# Patient Record
Sex: FEMALE | Race: WHITE | NOT HISPANIC OR LATINO | Employment: OTHER | URBAN - METROPOLITAN AREA
[De-identification: names, ages, dates, MRNs, and addresses within clinical notes are randomized per-mention and may not be internally consistent; named-entity substitution may affect disease eponyms.]

---

## 2020-05-20 ENCOUNTER — LAB VISIT (OUTPATIENT)
Dept: PRIMARY CARE CLINIC | Facility: CLINIC | Age: 65
End: 2020-05-20
Payer: MEDICARE

## 2020-05-20 DIAGNOSIS — R05.9 COUGH: Primary | ICD-10-CM

## 2020-05-20 PROCEDURE — U0003 INFECTIOUS AGENT DETECTION BY NUCLEIC ACID (DNA OR RNA); SEVERE ACUTE RESPIRATORY SYNDROME CORONAVIRUS 2 (SARS-COV-2) (CORONAVIRUS DISEASE [COVID-19]), AMPLIFIED PROBE TECHNIQUE, MAKING USE OF HIGH THROUGHPUT TECHNOLOGIES AS DESCRIBED BY CMS-2020-01-R: HCPCS

## 2020-05-21 LAB — SARS-COV-2 RNA RESP QL NAA+PROBE: NOT DETECTED

## 2022-01-14 ENCOUNTER — LAB VISIT (OUTPATIENT)
Dept: PRIMARY CARE CLINIC | Facility: CLINIC | Age: 67
End: 2022-01-14
Payer: MEDICARE

## 2022-01-14 DIAGNOSIS — Z20.822 CONTACT WITH AND (SUSPECTED) EXPOSURE TO COVID-19: ICD-10-CM

## 2022-01-14 LAB
CTP QC/QA: YES
SARS-COV-2 AG RESP QL IA.RAPID: NEGATIVE

## 2022-01-14 PROCEDURE — 87811 SARS-COV-2 COVID19 W/OPTIC: CPT

## 2023-07-03 ENCOUNTER — TELEPHONE (OUTPATIENT)
Dept: PAIN MEDICINE | Facility: CLINIC | Age: 68
End: 2023-07-03
Payer: MEDICARE

## 2023-07-05 ENCOUNTER — OFFICE VISIT (OUTPATIENT)
Dept: PAIN MEDICINE | Facility: CLINIC | Age: 68
End: 2023-07-05
Attending: ANESTHESIOLOGY
Payer: MEDICARE

## 2023-07-05 ENCOUNTER — TELEPHONE (OUTPATIENT)
Dept: PAIN MEDICINE | Facility: CLINIC | Age: 68
End: 2023-07-05

## 2023-07-05 ENCOUNTER — HOSPITAL ENCOUNTER (OUTPATIENT)
Dept: RADIOLOGY | Facility: OTHER | Age: 68
Discharge: HOME | End: 2023-07-05
Attending: ANESTHESIOLOGY
Payer: MEDICARE

## 2023-07-05 VITALS
WEIGHT: 160.94 LBS | DIASTOLIC BLOOD PRESSURE: 71 MMHG | RESPIRATION RATE: 18 BRPM | HEART RATE: 69 BPM | TEMPERATURE: 99 F | SYSTOLIC BLOOD PRESSURE: 104 MMHG | OXYGEN SATURATION: 100 %

## 2023-07-05 DIAGNOSIS — M47.26 OSTEOARTHRITIS OF SPINE WITH RADICULOPATHY, LUMBAR REGION: ICD-10-CM

## 2023-07-05 DIAGNOSIS — M25.551 RIGHT HIP PAIN: ICD-10-CM

## 2023-07-05 DIAGNOSIS — M54.14 THORACIC RADICULOPATHY: Primary | ICD-10-CM

## 2023-07-05 PROCEDURE — 73502 XR HIP WITH PELVIS WHEN PERFORMED, 2 OR 3  VIEWS RIGHT: ICD-10-PCS | Mod: 26,RT,, | Performed by: RADIOLOGY

## 2023-07-05 PROCEDURE — 1160F PR REVIEW ALL MEDS BY PRESCRIBER/CLIN PHARMACIST DOCUMENTED: ICD-10-PCS | Mod: CPTII,S$GLB,, | Performed by: ANESTHESIOLOGY

## 2023-07-05 PROCEDURE — 1100F PTFALLS ASSESS-DOCD GE2>/YR: CPT | Mod: CPTII,S$GLB,, | Performed by: ANESTHESIOLOGY

## 2023-07-05 PROCEDURE — 99999 PR PBB SHADOW E&M-EST. PATIENT-LVL IV: CPT | Mod: PBBFAC,,, | Performed by: ANESTHESIOLOGY

## 2023-07-05 PROCEDURE — 4010F ACE/ARB THERAPY RXD/TAKEN: CPT | Mod: CPTII,S$GLB,, | Performed by: ANESTHESIOLOGY

## 2023-07-05 PROCEDURE — 99204 PR OFFICE/OUTPT VISIT, NEW, LEVL IV, 45-59 MIN: ICD-10-PCS | Mod: GC,S$GLB,, | Performed by: ANESTHESIOLOGY

## 2023-07-05 PROCEDURE — 3074F PR MOST RECENT SYSTOLIC BLOOD PRESSURE < 130 MM HG: ICD-10-PCS | Mod: CPTII,S$GLB,, | Performed by: ANESTHESIOLOGY

## 2023-07-05 PROCEDURE — 3074F SYST BP LT 130 MM HG: CPT | Mod: CPTII,S$GLB,, | Performed by: ANESTHESIOLOGY

## 2023-07-05 PROCEDURE — 1125F PR PAIN SEVERITY QUANTIFIED, PAIN PRESENT: ICD-10-PCS | Mod: CPTII,S$GLB,, | Performed by: ANESTHESIOLOGY

## 2023-07-05 PROCEDURE — 99204 OFFICE O/P NEW MOD 45 MIN: CPT | Mod: GC,S$GLB,, | Performed by: ANESTHESIOLOGY

## 2023-07-05 PROCEDURE — 3288F FALL RISK ASSESSMENT DOCD: CPT | Mod: CPTII,S$GLB,, | Performed by: ANESTHESIOLOGY

## 2023-07-05 PROCEDURE — 1100F PR PT FALLS ASSESS DOC 2+ FALLS/FALL W/INJURY/YR: ICD-10-PCS | Mod: CPTII,S$GLB,, | Performed by: ANESTHESIOLOGY

## 2023-07-05 PROCEDURE — 73502 X-RAY EXAM HIP UNI 2-3 VIEWS: CPT | Mod: TC,FY,RT

## 2023-07-05 PROCEDURE — 3078F DIAST BP <80 MM HG: CPT | Mod: CPTII,S$GLB,, | Performed by: ANESTHESIOLOGY

## 2023-07-05 PROCEDURE — 73502 X-RAY EXAM HIP UNI 2-3 VIEWS: CPT | Mod: 26,RT,, | Performed by: RADIOLOGY

## 2023-07-05 PROCEDURE — 99999 PR PBB SHADOW E&M-EST. PATIENT-LVL IV: ICD-10-PCS | Mod: PBBFAC,,, | Performed by: ANESTHESIOLOGY

## 2023-07-05 PROCEDURE — 4010F PR ACE/ARB THEARPY RXD/TAKEN: ICD-10-PCS | Mod: CPTII,S$GLB,, | Performed by: ANESTHESIOLOGY

## 2023-07-05 PROCEDURE — 1159F PR MEDICATION LIST DOCUMENTED IN MEDICAL RECORD: ICD-10-PCS | Mod: CPTII,S$GLB,, | Performed by: ANESTHESIOLOGY

## 2023-07-05 PROCEDURE — 1125F AMNT PAIN NOTED PAIN PRSNT: CPT | Mod: CPTII,S$GLB,, | Performed by: ANESTHESIOLOGY

## 2023-07-05 PROCEDURE — 1159F MED LIST DOCD IN RCRD: CPT | Mod: CPTII,S$GLB,, | Performed by: ANESTHESIOLOGY

## 2023-07-05 PROCEDURE — 1160F RVW MEDS BY RX/DR IN RCRD: CPT | Mod: CPTII,S$GLB,, | Performed by: ANESTHESIOLOGY

## 2023-07-05 PROCEDURE — 3288F PR FALLS RISK ASSESSMENT DOCUMENTED: ICD-10-PCS | Mod: CPTII,S$GLB,, | Performed by: ANESTHESIOLOGY

## 2023-07-05 PROCEDURE — 3078F PR MOST RECENT DIASTOLIC BLOOD PRESSURE < 80 MM HG: ICD-10-PCS | Mod: CPTII,S$GLB,, | Performed by: ANESTHESIOLOGY

## 2023-07-05 RX ORDER — IRBESARTAN 150 MG/1
150 TABLET ORAL NIGHTLY
COMMUNITY

## 2023-07-05 RX ORDER — ESTRADIOL 0.1 MG/G
CREAM VAGINAL DAILY
COMMUNITY

## 2023-07-05 RX ORDER — FLUOXETINE HYDROCHLORIDE 40 MG/1
40 CAPSULE ORAL DAILY
COMMUNITY

## 2023-07-05 RX ORDER — ALENDRONATE SODIUM 70 MG/1
70 TABLET ORAL
COMMUNITY

## 2023-07-05 RX ORDER — IPRATROPIUM BROMIDE 42 UG/1
2 SPRAY, METERED NASAL 4 TIMES DAILY
COMMUNITY

## 2023-07-05 RX ORDER — HYDROCHLOROTHIAZIDE 25 MG/1
25 TABLET ORAL DAILY
COMMUNITY
End: 2024-01-10

## 2023-07-05 RX ORDER — ASPIRIN 81 MG/1
81 TABLET ORAL DAILY
COMMUNITY

## 2023-07-05 RX ORDER — AZELASTINE HYDROCHLORIDE 0.5 MG/ML
1 SOLUTION/ DROPS OPHTHALMIC 2 TIMES DAILY
COMMUNITY
End: 2024-01-10

## 2023-07-05 RX ORDER — HYDROCODONE BITARTRATE AND ACETAMINOPHEN 7.5; 325 MG/15ML; MG/15ML
SOLUTION ORAL 4 TIMES DAILY PRN
COMMUNITY

## 2023-07-05 NOTE — PROGRESS NOTES
Subjective:      Patient ID: Rebecca Zollinger is a 67 y.o. female.    Chief Complaint: No chief complaint on file.    Referred by: Self, Aaareferral     HPI    Interventional Pain History  ***  No past medical history on file.    No past surgical history on file.    Review of patient's allergies indicates:  Not on File    No current outpatient medications on file.     No current facility-administered medications for this visit.       No family history on file.    Social History     Socioeconomic History    Marital status:            ROS        Objective:   There were no vitals taken for this visit.  Pain Disability Index Review:  No flowsheet data found.  Normocephalic.  Atraumatic.  Affect appropriate.  Breathing unlabored.  Extra ocular muscles intact.           Ortho/SPM Exam      Assessment:       No diagnosis found.      Plan:   We discussed with the patient the assessment and recommendations. The following is the plan we agreed on:        There are no diagnoses linked to this encounter.

## 2023-07-05 NOTE — PROGRESS NOTES
Subjective:      Patient ID: Rebecca Zollinger is a 67 y.o. female.    Chief Complaint: Back Pain, Mid-back Pain, and Low-back Pain    Referred by: Self, Aaareferral       Back Pain  Pertinent negatives include no abdominal pain or bladder incontinence.   Mid-back Pain  Pertinent negatives include no abdominal pain or bladder incontinence.   Low-back Pain  Pertinent negatives include no abdominal pain or bladder incontinence.   67 year old female who presents with Right sided lower back pain. 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 The pain is located in her R back and goes to the R hip and groin when she walks. At worst it is a 10/10, baseline is a 3/10. She describes it as aching. She used to be able to walk 4 miles, now she states she can only walk 1 mile without pain. Last saw pain management at  2 years ago, no interventions performed. She has tried multiple rounds of PT, most recently in 2022 with some improvement, completed over 6 weeks of PT. She takes occasional ibuprofen and tylenol. Used to take tramadol and opioids but is no longer taking. She has had an MRI back in 2021 showing protruding disc at T12-L1. She denies any new weakness, denies fevers, numbness, saddle anesthesia or incontinence.     Interventional Pain History  None  History reviewed. No pertinent past medical history.    History reviewed. No pertinent surgical history.    Review of patient's allergies indicates:  No Known Allergies    Current Outpatient Medications   Medication Sig Dispense Refill    alendronate (FOSAMAX) 70 MG tablet Take 70 mg by mouth every 7 days.      aspirin (ECOTRIN) 81 MG EC tablet Take 81 mg by mouth once daily.      azelastine (OPTIVAR) 0.05 % ophthalmic solution 1 drop 2 (two) times daily.      estradioL (ESTRACE) 0.01 % (0.1 mg/gram) vaginal cream Place vaginally once daily.      FLUoxetine 40 MG capsule Take 40 mg by  mouth once daily.      hydroCHLOROthiazide (HYDRODIURIL) 25 MG tablet Take 25 mg by mouth once daily.      hydrocodone-acetaminophen (HYCET) solution 7.5-325 mg/15mL Take by mouth 4 (four) times daily as needed for Pain.      ipratropium (ATROVENT) 42 mcg (0.06 %) nasal spray 2 sprays by Each Nostril route 4 (four) times daily.      irbesartan (AVAPRO) 150 MG tablet Take 150 mg by mouth every evening.       No current facility-administered medications for this visit.       History reviewed. No pertinent family history.    Social History     Socioeconomic History    Marital status:    Tobacco Use    Smoking status: Former     Types: Cigarettes     Passive exposure: Past    Smokeless tobacco: Never    Tobacco comments:     Quit 20 years ago   Substance and Sexual Activity    Alcohol use: Not Currently    Drug use: Never    Sexual activity: Not Currently           Review of Systems   Constitutional: Negative for weight gain.   HENT:  Negative for ear discharge.    Eyes:  Negative for blurred vision.   Cardiovascular:  Negative for claudication.   Respiratory:  Negative for wheezing.    Skin:  Negative for color change.   Musculoskeletal:  Positive for back pain.   Gastrointestinal:  Negative for abdominal pain.   Genitourinary:  Negative for bladder incontinence.   Neurological:  Negative for disturbances in coordination.   Psychiatric/Behavioral:  Negative for altered mental status.          Objective:   /71   Pulse 69   Temp 98.5 °F (36.9 °C) (Oral)   Resp 18   Wt 73 kg (160 lb 15 oz)   SpO2 100%   Pain Disability Index Review:  Last 3 PDI Scores 7/5/2023   Pain Disability Index (PDI) 52     Normocephalic.  Atraumatic.  Affect appropriate.  Breathing unlabored.  Extra ocular muscles intact.               General Musculoskeletal Exam   Gait: normal     Right Ankle/Foot Exam     Tests   Heel Walk: able to perform  Tiptoe Walk: able to perform    Left Ankle/Foot Exam     Tests   Heel Walk: able to  perform  Tiptoe Walk: able to perform  Back (L-Spine & T-Spine) / Neck (C-Spine) Exam     Tenderness Right paramedian tenderness of the Lower T-Spine and Upper L-Spine.     Back (L-Spine & T-Spine) Range of Motion   Extension:  normal   Flexion:  normal   Rotation right:  normal   Rotation left:  normal     Back (L-Spine & T-Spine) Tests   Right Side Tests  Femoral Stretch: negative  Left Side Tests  Femoral Stretch: negative    Other   She has no scoliosis .    Comments:  Hip internal/ext normal, full ROM, does not elicit pain  CECY Positive on R      Muscle Strength   Right Lower Extremity   Hip Abduction: 5/5   Hip Flexion: 5/5   Hip Extensors: 5/5  Quadriceps:  5/5   Hamstrin/5   Anterior tibial:  5/5   Gastrocsoleus:  5/5   EHL:  5/5  Left Lower Extremity   Hip Abduction: 5/5   Hip Flexion: 5/5   Hip Extensors: 5/5  Quadriceps:  5/5   Hamstrin/5   Anterior tibial:  5/5   Gastrocsoleus:  5/5   EHL:  5/5    Reflexes     Left Side  Achilles:  2+  Babinski Sign:  absent  Ankle Clonus:  absent  Quadriceps:  2+    Right Side   Achilles:  2+  Babinski Sign:  absent  Ankle Clonus:  absent  Quadriceps:  2+      Assessment:       Encounter Diagnoses   Name Primary?    Thoracic radiculopathy Yes    Osteoarthritis of spine with radiculopathy, lumbar region     Right hip pain          Plan:   Imaging:   MRI of thoracic and lumbar spine reviewed from   Our interpretation: Tarlov cysts in sacral spine, Extruding disc T12-L1 R sided, disc osteophyte complex T9-T10, T10-11      We discussed with the patient the assessment and recommendations.   The following is the plan we agreed on:    DDD, Thoracic radiculopathy   Plan for T12/L1 R TFESI. Follow up in clinic in 2 weeks  2.  R hip X ray today to assess for osteoarthritis  3.  If TFESI does not relieve symptoms, can consider diagnostic/therapeutic R hip injection.       Xochitl was seen today for back pain, mid-back pain and low-back pain.    Diagnoses and all  orders for this visit:    Thoracic radiculopathy  -     Procedure Order to Pain Management; Future    Osteoarthritis of spine with radiculopathy, lumbar region    Right hip pain  -     X-Ray Hip 2 or 3 views Right (with Pelvis when performed); Future     Ty Ronquillo MD  [employer] [facility] Fellow   I have personally taken the history and examined this patient and agree with the fellow's note as stated above.   This encounter took at least 45 minutes spent in chart review, history, physical, image, assessment and plan discussion.

## 2023-07-05 NOTE — TELEPHONE ENCOUNTER
Staff called patient and LVM informing her that she will be receiving a call from the scheduling department.

## 2023-07-05 NOTE — TELEPHONE ENCOUNTER
----- Message from Torie Landrum sent at 7/5/2023 11:57 AM CDT -----  .Type: Patient Call Back    Who called: Self     What is the request in detail: Calling to schedule epidural     Can the clinic reply by [facility]? No     Would the patient rather a call back or a response via [facility]? Call Back     Best call back number: .[phone] (home)       Additional Information:

## 2023-07-20 ENCOUNTER — TELEPHONE (OUTPATIENT)
Dept: PAIN MEDICINE | Facility: CLINIC | Age: 68
End: 2023-07-20
Payer: MEDICARE

## 2023-07-20 ENCOUNTER — PATIENT MESSAGE (OUTPATIENT)
Dept: PAIN MEDICINE | Facility: OTHER | Age: 68
End: 2023-07-20
Payer: MEDICARE

## 2023-07-20 NOTE — TELEPHONE ENCOUNTER
----- Message from Alexandria Cuellar sent at 7/20/2023  4:16 PM CDT -----  Please schedule 2 wk follow-up after proc 8/14/23 with Dr. Womack.

## 2023-07-20 NOTE — TELEPHONE ENCOUNTER
Staff tried to call patient to schedule her an appointment for a follow up after her procedure. Staff left a voice message for the patient to call back to get scheduled.

## 2023-07-21 ENCOUNTER — TELEPHONE (OUTPATIENT)
Dept: PAIN MEDICINE | Facility: CLINIC | Age: 68
End: 2023-07-21
Payer: MEDICARE

## 2023-07-21 NOTE — TELEPHONE ENCOUNTER
----- Message from Mart Sousa sent at 7/21/2023  3:03 PM CDT -----  Contact: patient  Type:  Patient Call          Who Called: patient         Does the patient know what this is regarding?: requesting a all back to have a appt scheduled ;please advise           Would the patient rather a call back or a response via [facility]?  Call           Best Call Back Number:[phone] (home)              Additional Information:

## 2023-08-09 ENCOUNTER — PATIENT MESSAGE (OUTPATIENT)
Dept: ADMINISTRATIVE | Facility: OTHER | Age: 68
End: 2023-08-09
Payer: MEDICARE

## 2023-08-10 ENCOUNTER — TELEPHONE (OUTPATIENT)
Dept: PAIN MEDICINE | Facility: CLINIC | Age: 68
End: 2023-08-10
Payer: MEDICARE

## 2023-08-10 NOTE — TELEPHONE ENCOUNTER
----- Message from Daniel Padgett sent at 8/10/2023  2:01 PM CDT -----  Regarding: Procedure Time  Name of Who is Calling:  Patient          What is the request in detail:  Patient would like to know the time of her procedure she stated if she does not answer leave a message on the voicemail.            Can the clinic reply by [facility]: Yes            What Number to Call Back if not in [facility]:[phone]

## 2023-08-14 ENCOUNTER — HOSPITAL ENCOUNTER (OUTPATIENT)
Facility: OTHER | Age: 68
Discharge: HOME | End: 2023-08-14
Attending: ANESTHESIOLOGY | Admitting: ANESTHESIOLOGY
Payer: MEDICARE

## 2023-08-14 VITALS
TEMPERATURE: 98 F | HEART RATE: 59 BPM | BODY MASS INDEX: 27.31 KG/M2 | HEIGHT: 64 IN | SYSTOLIC BLOOD PRESSURE: 109 MMHG | RESPIRATION RATE: 16 BRPM | OXYGEN SATURATION: 96 % | WEIGHT: 160 LBS | DIASTOLIC BLOOD PRESSURE: 65 MMHG

## 2023-08-14 DIAGNOSIS — M54.16 LUMBAR RADICULOPATHY: Primary | ICD-10-CM

## 2023-08-14 DIAGNOSIS — G89.29 CHRONIC PAIN: ICD-10-CM

## 2023-08-14 PROCEDURE — 25500020 PHARM REV CODE 255: Performed by: ANESTHESIOLOGY

## 2023-08-14 PROCEDURE — 64479 PR INJECT ANES/STEROID FORAMEN CERV/THORACIC W IMG GUIDE ,1 LEVEL: ICD-10-PCS | Mod: RT,,, | Performed by: ANESTHESIOLOGY

## 2023-08-14 PROCEDURE — 25000003 PHARM REV CODE 250: Performed by: ANESTHESIOLOGY

## 2023-08-14 PROCEDURE — 64479 NJX AA&/STRD TFRM EPI C/T 1: CPT | Mod: RT,,, | Performed by: ANESTHESIOLOGY

## 2023-08-14 PROCEDURE — 25000003 PHARM REV CODE 250: Performed by: STUDENT IN AN ORGANIZED HEALTH CARE EDUCATION/TRAINING PROGRAM

## 2023-08-14 PROCEDURE — 64479 NJX AA&/STRD TFRM EPI C/T 1: CPT | Mod: RT | Performed by: ANESTHESIOLOGY

## 2023-08-14 PROCEDURE — 63600175 PHARM REV CODE 636 W HCPCS: Performed by: ANESTHESIOLOGY

## 2023-08-14 RX ORDER — LIDOCAINE HYDROCHLORIDE 10 MG/ML
INJECTION, SOLUTION EPIDURAL; INFILTRATION; INTRACAUDAL; PERINEURAL
Status: DISCONTINUED | OUTPATIENT
Start: 2023-08-14 | End: 2023-08-14 | Stop reason: HOSPADM

## 2023-08-14 RX ORDER — DEXAMETHASONE SODIUM PHOSPHATE 10 MG/ML
INJECTION INTRAMUSCULAR; INTRAVENOUS
Status: DISCONTINUED | OUTPATIENT
Start: 2023-08-14 | End: 2023-08-14 | Stop reason: HOSPADM

## 2023-08-14 RX ORDER — SODIUM CHLORIDE 9 MG/ML
INJECTION, SOLUTION INTRAVENOUS CONTINUOUS
Status: DISCONTINUED | OUTPATIENT
Start: 2023-08-14 | End: 2023-08-14 | Stop reason: HOSPADM

## 2023-08-14 RX ORDER — MIDAZOLAM HYDROCHLORIDE 1 MG/ML
INJECTION INTRAMUSCULAR; INTRAVENOUS
Status: DISCONTINUED | OUTPATIENT
Start: 2023-08-14 | End: 2023-08-14 | Stop reason: HOSPADM

## 2023-08-14 RX ORDER — LIDOCAINE HYDROCHLORIDE 20 MG/ML
INJECTION, SOLUTION INFILTRATION; PERINEURAL
Status: DISCONTINUED | OUTPATIENT
Start: 2023-08-14 | End: 2023-08-14 | Stop reason: HOSPADM

## 2023-08-14 NOTE — H&P
HPI  Patient presenting for Procedure(s) (LRB):  INJECTION, STEROID, EPIDURAL, TRANSFORAMINAL APPROACH, RIGHT T12/L1 SOONER DATE (Right)     Patient on Anti-coagulation  ASA 81mg    No health changes since previous encounter    No past medical history on file.  No past surgical history on file.  Review of patient's allergies indicates:  No Known Allergies   No current facility-administered medications for this encounter.       PMHx, PSHx, Allergies, Medications reviewed in epic    ROS negative except pain complaints in HPI    OBJECTIVE:    There were no vitals taken for this visit.    PHYSICAL EXAMINATION:    GENERAL: Well appearing, in no acute distress, alert and oriented x3.  PSYCH:  Mood and affect appropriate.  SKIN: Skin color, texture, turgor normal, no rashes or lesions which will impact the procedure.  CV: RRR with palpation of the radial artery.  PULM: No evidence of respiratory difficulty, symmetric chest rise. Clear to auscultation.  NEURO: Cranial nerves grossly intact.    Plan:    Proceed with procedure as planned Procedure(s) (LRB):  INJECTION, STEROID, EPIDURAL, TRANSFORAMINAL APPROACH, RIGHT T12/L1 SOONER DATE (Right)    Guzman Felix  08/14/2023

## 2023-08-14 NOTE — DISCHARGE SUMMARY
Discharge Note  Short Stay      SUMMARY     Admit Date: 8/14/2023    Attending Physician: Isi Womack      Discharge Physician: Isi Womack      Discharge Date: 8/14/2023 10:08 AM    Procedure(s) (LRB):  INJECTION, STEROID, EPIDURAL, TRANSFORAMINAL APPROACH, RIGHT T12/L1 SOONER DATE (Right)    Final Diagnosis: Thoracic radiculopathy [M54.14]    Disposition: Home or self care    Patient Instructions:   Current Discharge Medication List        CONTINUE these medications which have NOT CHANGED    Details   alendronate (FOSAMAX) 70 MG tablet Take 70 mg by mouth every 7 days.      aspirin (ECOTRIN) 81 MG EC tablet Take 81 mg by mouth once daily.      azelastine (OPTIVAR) 0.05 % ophthalmic solution 1 drop 2 (two) times daily.      estradioL (ESTRACE) 0.01 % (0.1 mg/gram) vaginal cream Place vaginally once daily.      FLUoxetine 40 MG capsule Take 40 mg by mouth once daily.      hydroCHLOROthiazide (HYDRODIURIL) 25 MG tablet Take 25 mg by mouth once daily.      hydrocodone-acetaminophen (HYCET) solution 7.5-325 mg/15mL Take by mouth 4 (four) times daily as needed for Pain.      ipratropium (ATROVENT) 42 mcg (0.06 %) nasal spray 2 sprays by Each Nostril route 4 (four) times daily.      irbesartan (AVAPRO) 150 MG tablet Take 150 mg by mouth every evening.                 Discharge Diagnosis: Thoracic radiculopathy [M54.14]  Condition on Discharge: Stable with no complications to procedure   Diet on Discharge: Same as before.  Activity: as per instruction sheet.  Discharge to: Home with a responsible adult.  Follow up: 2-4 weeks       Please call my office or pager at [phone] if experienced any weakness or loss of sensation, fever > 101.5, pain uncontrolled with oral medications, persistent nausea/vomiting/or diarrhea, redness or drainage from the incisions, or any other worrisome concerns. If physician on call was not reached or could not communicate with our office for any reason please go to the nearest emergency  department

## 2023-08-14 NOTE — PLAN OF CARE
Patient states takes Uber for ride home, Spoke with Dr Womack and stated it was fine to take Uber

## 2023-08-14 NOTE — DISCHARGE INSTRUCTIONS

## 2023-08-14 NOTE — OP NOTE
Thoracolumbar Transforaminal Epidural Steroid Injection under Fluoroscopic Guidance    The procedure, risks, benefits, and options were discussed with the patient. There are no contraindications to the procedure. The patent expressed understanding and agreed to the procedure. Informed written consent was obtained prior to the start of the procedure and can be found in the patient's chart.    PATIENT NAME: Rebecca Zollinger   MRN: [id number]     DATE OF PROCEDURE: 08/14/2023    PROCEDURE:  Right  T12/L1 Lumbar Transforaminal Epidural Steroid Injection under Fluoroscopic Guidance    PRE-OP DIAGNOSIS: Thoracic radiculopathy [M54.14] Lumbar radiculopathy [M54.16]    POST-OP DIAGNOSIS: Same    PHYSICIAN: Isi Womack MD    ASSISTANTS: Adam Tadeo MD Fellow     MEDICATIONS INJECTED: Preservative-free Decadron 10mg with 5cc of Lidocaine 1% MPF     LOCAL ANESTHETIC INJECTED: Xylocaine 2%     SEDATION: Versed 2mg                                                                                                                                                                    Conscious sedation ordered by M.DSirena Patient re-evaluation prior to administration of conscious sedation. No changes noted in patient's status from initial evaluation. The patient's vital signs were monitored by RN and patient remained hemodynamically stable throughout the procedure.    Event Time In   Sedation Start 0956   Sedation End 1002       ESTIMATED BLOOD LOSS: None    COMPLICATIONS: None    TECHNIQUE: Time-out was performed to identify the patient and procedure to be performed. With the patient laying in a prone position, the surgical area was prepped and draped in the usual sterile fashion using ChloraPrep and a fenestrated drape.The levels were determined under fluoroscopy guidance. Skin anesthesia was achieved by injecting Lidocaine 2% over the injection sites. The transforaminal spaces were then approached with a 25 gauge, 3.5 inch spinal  quinke needle that was introduced under fluoroscopic guidance in the AP and Lateral views. Once the needle tip was in the area of the transforaminal space, and there was no blood, CSF or paraesthesias, contrast dye Omnipaque (300mg/mL) was injected to confirm placement and there was no vascular runoff. Fluoroscopic imaging in the AP and lateral views revealed a clear outline of the spinal nerve with proximal spread of agent through the neural foramen into the epidural space. 6 mL of the medication mixture listed above was injected slowly at each site. Displacement of the radio opaque contrast after injection of the medication confirmed that the medication went into the area of the transforaminal spaces. The needles were removed and bleeding was nil. A sterile dressing was applied. No specimens collected. The patient tolerated the procedure well.     PAIN BEFORE THE PROCEDURE: 8/10    PAIN AFTER THE PROCEDURE: 5/10    The patient was monitored after the procedure in the recovery area. They were given post-procedure and discharge instructions to follow at home. The patient was discharged in a stable condition.      Isi Womack MD

## 2023-08-28 ENCOUNTER — OFFICE VISIT (OUTPATIENT)
Dept: PAIN MEDICINE | Facility: CLINIC | Age: 68
End: 2023-08-28
Payer: MEDICARE

## 2023-08-28 DIAGNOSIS — M54.14 THORACIC RADICULOPATHY: ICD-10-CM

## 2023-08-28 DIAGNOSIS — M47.816 LUMBAR SPONDYLOSIS: ICD-10-CM

## 2023-08-28 DIAGNOSIS — M54.16 LUMBAR RADICULOPATHY: Primary | ICD-10-CM

## 2023-08-28 DIAGNOSIS — M51.36 DDD (DEGENERATIVE DISC DISEASE), LUMBAR: ICD-10-CM

## 2023-08-28 PROCEDURE — 4010F PR ACE/ARB THEARPY RXD/TAKEN: ICD-10-PCS | Mod: CPTII,95,, | Performed by: NURSE PRACTITIONER

## 2023-08-28 PROCEDURE — 1160F PR REVIEW ALL MEDS BY PRESCRIBER/CLIN PHARMACIST DOCUMENTED: ICD-10-PCS | Mod: CPTII,95,, | Performed by: NURSE PRACTITIONER

## 2023-08-28 PROCEDURE — 99213 OFFICE O/P EST LOW 20 MIN: CPT | Mod: 95,,, | Performed by: NURSE PRACTITIONER

## 2023-08-28 PROCEDURE — 1160F RVW MEDS BY RX/DR IN RCRD: CPT | Mod: CPTII,95,, | Performed by: NURSE PRACTITIONER

## 2023-08-28 PROCEDURE — 1159F PR MEDICATION LIST DOCUMENTED IN MEDICAL RECORD: ICD-10-PCS | Mod: CPTII,95,, | Performed by: NURSE PRACTITIONER

## 2023-08-28 PROCEDURE — 1159F MED LIST DOCD IN RCRD: CPT | Mod: CPTII,95,, | Performed by: NURSE PRACTITIONER

## 2023-08-28 PROCEDURE — 4010F ACE/ARB THERAPY RXD/TAKEN: CPT | Mod: CPTII,95,, | Performed by: NURSE PRACTITIONER

## 2023-08-28 PROCEDURE — 99213 PR OFFICE/OUTPT VISIT, EST, LEVL III, 20-29 MIN: ICD-10-PCS | Mod: 95,,, | Performed by: NURSE PRACTITIONER

## 2023-08-28 NOTE — PROGRESS NOTES
Chronic patient Established Note (Follow up visit)  Chronic Pain-Tele-Medicine-Established Note (Follow up visit)        The patient location is: Home  The chief complaint leading to consultation is: pain  Visit type: Virtual visit with synchronous audio and video  Total time spent with patient: 25 min  Each patient to whom he or she provides medical services by telemedicine is:  (1) informed of the relationship between the physician and patient and the respective role of any other health care provider with respect to management of the patient; and (2) notified that he or she may decline to receive medical services by telemedicine and may withdraw from such care at any time.      SUBJECTIVE:    Interval History 8/28/2023:  Rebecca Johnston Zollinger presents to the clinic for a follow-up appointment for back pain via virtual visit. She is s/p right T12/L1 TF SANJUANA on 8/14/2023. She reports 100% relief of her mid back pain. She continues to report low back pain that radiates into her groin bilaterally, right greater than left. This is worse with prolonged walking. She is no longer able to walk 4 miles, which is her typical exercise routine. She has completed multiple rounds of PT. She denies any other health changes.       HPI:  67 year old female who presents with Right sided lower back pain. 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 The pain is located in her R back and goes to the R hip and groin when she walks. At worst it is a 10/10, baseline is a 3/10. She describes it as aching. She used to be able to walk 4 miles, now she states she can only walk 1 mile without pain. Last saw pain management at  2 years ago, no interventions performed. She has tried multiple rounds of PT, most recently in 2022 with some improvement, completed over 6 weeks of PT. She takes occasional ibuprofen and tylenol. Used to take tramadol and opioids  but is no longer taking. She has had an MRI back in 2021 showing protruding disc at T12-L1. She denies any new weakness, denies fevers, numbness, saddle anesthesia or incontinence.     Pain Disability Index Review:      7/5/2023     8:27 AM   Last 3 PDI Scores   Pain Disability Index (PDI) 52       Pain Medications:  None    Opioid Contract: not applicable     report:  Not applicable    Pain Procedures:   8/14/2023- Right T12/L1 TF SANJUANA    Physical Therapy/Home Exercise: yes    Imaging:   Xray Hip 7/5/2023:  FINDINGS:  No acute fractures.  Some degenerative changes visualized lower lumbar spine to include endplate osteophytes and lumbosacral disc narrowing and possible vacuum phenomenon.  Intact right and left SI joints.  No definite narrowing of right or left hip joint spaces or acetabular spurring.  Preserved right and left femoral head contours.     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      aspirin (ECOTRIN) 81 MG EC tablet Take 81 mg by mouth once daily.      azelastine (OPTIVAR) 0.05 % ophthalmic solution 1 drop 2 (two) times daily.      estradioL (ESTRACE) 0.01 % (0.1 mg/gram) vaginal cream Place vaginally once daily.      FLUoxetine 40 MG capsule Take 40 mg by mouth once daily.      hydroCHLOROthiazide (HYDRODIURIL) 25 MG tablet Take 25 mg by mouth once daily.      hydrocodone-acetaminophen (HYCET) solution 7.5-325 mg/15mL Take by mouth 4 (four) times daily as needed for Pain.      ipratropium (ATROVENT) 42 mcg (0.06 %) nasal spray 2 sprays by Each Nostril route 4 (four) times daily.      irbesartan  (AVAPRO) 150 MG tablet Take 150 mg by mouth every evening.       No current facility-administered medications for this visit.       REVIEW OF SYSTEMS:    GENERAL:  No weight loss, malaise or fevers.  HEENT:  Negative for frequent or significant headaches.  NECK:  Negative for lumps, goiter, pain and significant neck swelling.  RESPIRATORY:  Negative for cough, wheezing or shortness of breath.  CARDIOVASCULAR:  Negative for chest pain, leg swelling or palpitations.  GI:  Negative for abdominal discomfort, blood in stools or black stools or change in bowel habits.  MUSCULOSKELETAL:  See HPI.  SKIN:  Negative for lesions, rash, and itching.  PSYCH:  Negative for sleep disturbance, mood disorder and recent psychosocial stressors.  HEMATOLOGY/LYMPHOLOGY:  Negative for prolonged bleeding, bruising easily or swollen nodes.  NEURO:   No history of headaches, syncope, paralysis, seizures or tremors.  All other reviewed and negative other than HPI.    Past Medical History:  No past medical history on file.    Past Surgical History:  Past Surgical History:   Procedure Laterality Date    TRANSFORAMINAL EPIDURAL INJECTION OF STEROID Right 8/14/2023    Procedure: INJECTION, STEROID, EPIDURAL, TRANSFORAMINAL APPROACH, RIGHT T12/L1 SOONER DATE;  Surgeon: Isi Womack MD;  Location: [facility];  Service: Pain Management;  Laterality: Right;       Family History:  No family history on file.    Social History:  Social History     Socioeconomic History    Marital status:    Tobacco Use    Smoking status: Former     Types: Cigarettes     Passive exposure: Past    Smokeless tobacco: Never    Tobacco comments:     Quit 20 years ago   Substance and Sexual Activity    Alcohol use: Not Currently    Drug use: Never    Sexual activity: Not Currently       OBJECTIVE:    Exam limited due to virtual visit:  General appearance: Well appearing, in no acute distress, alert and oriented x3.  Psych:  Mood and affect appropriate.    Previous  physical exam:  There were no vitals taken for this visit.    PHYSICAL EXAMINATION:    General appearance: Well appearing, in no acute distress, alert and oriented x3.  Psych:  Mood and affect appropriate.  Skin: Skin color, texture, turgor normal, no rashes or lesions, in both upper and lower body.  Head/face:  Atraumatic, normocephalic.   Cor: RRR  Pulm: Symmetric chest rise, no respiratory distress noted.   Back: Straight leg raising in the sitting and supine positions is negative to radicular pain. There is pain with palpation over right thoracic and lumbar facet joints.  Normal range of motion without pain reproduction.  Extremities: No deformities, edema, or skin discoloration. Good capillary refill.  Musculoskeletal: FABERs is positive on the right. Bilateral lower extremity strength is normal and symmetric.  No atrophy or tone abnormalities are noted.  Neuro:  No loss of sensation is noted.  Gait: Normal.    ASSESSMENT: 67 y.o. year old female with back pain, consistent with the followin. Lumbar radiculopathy        2. DDD (degenerative disc disease), lumbar        3. Lumbar spondylosis        4. Thoracic radiculopathy              PLAN:     - Previous imaging was reviewed and discussed with the patient today.    - She is s/p right T12/L1 TF SANJUANA with benefit.     - Obtain updated lumbar MRI given radicular pain.     - I have stressed the importance of physical activity and a home exercise plan to help with pain and improve health.    - RTC after imaging.     The above plan and management options were discussed at length with patient. Patient is in agreement with the above and verbalized understanding.    Maggy Weeks  2023

## 2023-09-22 ENCOUNTER — HOSPITAL ENCOUNTER (OUTPATIENT)
Dept: RADIOLOGY | Facility: HOSPITAL | Age: 68
Discharge: HOME | End: 2023-09-22
Attending: NURSE PRACTITIONER
Payer: MEDICARE

## 2023-09-22 DIAGNOSIS — M54.16 LUMBAR RADICULOPATHY: ICD-10-CM

## 2023-09-22 PROCEDURE — 72148 MRI LUMBAR SPINE W/O DYE: CPT | Mod: TC

## 2023-09-22 PROCEDURE — 72148 MRI LUMBAR SPINE W/O DYE: CPT | Mod: 26,,, | Performed by: RADIOLOGY

## 2023-09-22 PROCEDURE — 72148 MRI LUMBAR SPINE WITHOUT CONTRAST: ICD-10-PCS | Mod: 26,,, | Performed by: RADIOLOGY

## 2023-10-30 ENCOUNTER — TELEPHONE (OUTPATIENT)
Dept: PAIN MEDICINE | Facility: CLINIC | Age: 68
End: 2023-10-30
Payer: MEDICARE

## 2023-10-30 NOTE — TELEPHONE ENCOUNTER
----- Message from Cha Medeiros sent at 10/30/2023 11:57 AM CDT -----  Regarding: virtual  Name of caller: amarilis Moreno is the requesting detail: pt is waiting to be seen for her virtual appt. Please advise       Can the clinic reply by [facility]:       What number to call back:

## 2023-10-30 NOTE — TELEPHONE ENCOUNTER
----- Message from Kathleen Nevarez sent at 10/30/2023  1:00 PM CDT -----  Contact: [phone]  Patient had a virtual appointment today at 11:30, she states she has been signed in but no one joined her appointment, please advise, this patient is requesting a call back.

## 2023-10-30 NOTE — TELEPHONE ENCOUNTER
----- Message from Bernard Acosta sent at 10/30/2023  3:16 PM CDT -----  Name of Who is Calling:ZOLLINGER, REBECCA JOHNSTON [[id number]]           What is the request in detail:Patient had a virtual appointment today at 11:30, she states she has been signed in but no one joined her appointment, please advise, this patient is requesting a call back.              Can the clinic reply by [facility]: no           What Number to Call Back if not in [facility]:[phone]

## 2023-11-21 ENCOUNTER — OFFICE VISIT (OUTPATIENT)
Dept: PAIN MEDICINE | Facility: CLINIC | Age: 68
End: 2023-11-21
Payer: MEDICARE

## 2023-11-21 DIAGNOSIS — M47.816 LUMBAR SPONDYLOSIS: ICD-10-CM

## 2023-11-21 DIAGNOSIS — M54.16 LUMBAR RADICULOPATHY: Primary | ICD-10-CM

## 2023-11-21 DIAGNOSIS — M54.14 THORACIC RADICULOPATHY: ICD-10-CM

## 2023-11-21 DIAGNOSIS — M51.36 DDD (DEGENERATIVE DISC DISEASE), LUMBAR: ICD-10-CM

## 2023-11-21 PROCEDURE — 4010F ACE/ARB THERAPY RXD/TAKEN: CPT | Mod: CPTII,95,, | Performed by: NURSE PRACTITIONER

## 2023-11-21 PROCEDURE — 99213 PR OFFICE/OUTPT VISIT, EST, LEVL III, 20-29 MIN: ICD-10-PCS | Mod: 95,,, | Performed by: NURSE PRACTITIONER

## 2023-11-21 PROCEDURE — 1160F RVW MEDS BY RX/DR IN RCRD: CPT | Mod: CPTII,95,, | Performed by: NURSE PRACTITIONER

## 2023-11-21 PROCEDURE — 1159F MED LIST DOCD IN RCRD: CPT | Mod: CPTII,95,, | Performed by: NURSE PRACTITIONER

## 2023-11-21 PROCEDURE — 4010F PR ACE/ARB THEARPY RXD/TAKEN: ICD-10-PCS | Mod: CPTII,95,, | Performed by: NURSE PRACTITIONER

## 2023-11-21 PROCEDURE — 1159F PR MEDICATION LIST DOCUMENTED IN MEDICAL RECORD: ICD-10-PCS | Mod: CPTII,95,, | Performed by: NURSE PRACTITIONER

## 2023-11-21 PROCEDURE — 99213 OFFICE O/P EST LOW 20 MIN: CPT | Mod: 95,,, | Performed by: NURSE PRACTITIONER

## 2023-11-21 PROCEDURE — 1160F PR REVIEW ALL MEDS BY PRESCRIBER/CLIN PHARMACIST DOCUMENTED: ICD-10-PCS | Mod: CPTII,95,, | Performed by: NURSE PRACTITIONER

## 2023-11-21 NOTE — PROGRESS NOTES
Chronic patient Established Note (Follow up visit)  Chronic Pain-Tele-Medicine-Established Note (Follow up visit)        The patient location is: Home  The chief complaint leading to consultation is: pain  Visit type: Virtual visit with synchronous audio and video  Total time spent with patient: 25 min  Each patient to whom he or she provides medical services by telemedicine is:  (1) informed of the relationship between the physician and patient and the respective role of any other health care provider with respect to management of the patient; and (2) notified that he or she may decline to receive medical services by telemedicine and may withdraw from such care at any time.      SUBJECTIVE:    Interval History 11/21/2023:  The patient returns to clinic today for follow up of back pain via virtual visit. She is here today for imaging review. She reports worsened right sided mid and low back pain. This radiates into the right groin and anterior thigh. She denies any left leg pain. Her pain is worse with prolonged walking. She is performing a home exercise routine. She denies any other health changes.     Interval History 8/28/2023:  Rebecca Johnston Zollinger presents to the clinic for a follow-up appointment for back pain via virtual visit. She is s/p right T12/L1 TF SANJUANA on 8/14/2023. She reports 100% relief of her mid back pain. She continues to report low back pain that radiates into her groin bilaterally, right greater than left. This is worse with prolonged walking. She is no longer able to walk 4 miles, which is her typical exercise routine. She has completed multiple rounds of PT. She denies any other health changes.       HPI:  67 year old female who presents with Right sided lower back pain. 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 The pain is located in her R back and goes to the R hip and groin when she walks. At  worst it is a 10/10, baseline is a 3/10. She describes it as aching. She used to be able to walk 4 miles, now she states she can only walk 1 mile without pain. Last saw pain management at  2 years ago, no interventions performed. She has tried multiple rounds of PT, most recently in 2022 with some improvement, completed over 6 weeks of PT. She takes occasional ibuprofen and tylenol. Used to take tramadol and opioids but is no longer taking. She has had an MRI back in 2021 showing protruding disc at T12-L1. She denies any new weakness, denies fevers, numbness, saddle anesthesia or incontinence.     Pain Disability Index Review:      7/5/2023     8:27 AM   Last 3 PDI Scores   Pain Disability Index (PDI) 52       Pain Medications:  None    Opioid Contract: not applicable     report:  Not applicable    Pain Procedures:   8/14/2023- Right T12/L1 TF SANJUANA    Physical Therapy/Home Exercise: yes    Imaging:  MRI Lumbar Spine 9/22/2023:  COMPARISON:  None.     FINDINGS:  Alignment: Grade 1 retrolisthesis of L5-S1.  Straightening of lumbar lordosis.     Vertebrae: Multilevel degenerative endplate changes no fracture or marrow infiltrative process.     Discs: Moderate to severe disc height loss noted throughout the lumbar spine.  No evidence for discitis.     Cord: Conus terminates at L1-L2 and appears unremarkable.  Cauda equina appears unremarkable.     Degenerative findings:     T11-T12: Right paracentral disc extrusion results in moderate effacement of the right lateral recess and mass effect upon the right T12 nerve root.     T12-L1: Circumferential disc bulge with right paracentral disc extrusion result in mild effacement of the right lateral recess.     L1-L2: Circumferential disc bulge and mild facet arthropathy result in mild bilateral neural foraminal narrowing.     L2-L3: Circumferential disc bulge and mild facet arthropathy result in mild left neural foraminal narrowing.     L3-L4: Circumferential disc bulge and  moderate facet arthropathy result in mild spinal canal stenosis and mild bilateral neural foraminal narrowing.     L4-L5: Circumferential disc bulge and mild facet arthropathy result in mild effacement of the lateral recesses and mild bilateral neural foraminal narrowing peer     L5-S1: Circumferential disc bulge and mild facet arthropathy result in moderate right, mild left neural foraminal narrowing.     Paraspinal muscles & soft tissues: Moderate paraspinal muscle atrophy.  4.0 cm left renal cyst.     Impression:     1. Multilevel degenerative changes of the lumbar and lower thoracic spine as detailed above.     Xray Hip 7/5/2023:  FINDINGS:  No acute fractures.  Some degenerative changes visualized lower lumbar spine to include endplate osteophytes and lumbosacral disc narrowing and possible vacuum phenomenon.  Intact right and left SI joints.  No definite narrowing of right or left hip joint spaces or acetabular spurring.  Preserved right and left femoral head contours.     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      aspirin (ECOTRIN) 81 MG EC tablet Take 81 mg by mouth once daily.      azelastine (OPTIVAR) 0.05 % ophthalmic solution 1 drop 2 (two) times daily.      estradioL (ESTRACE) 0.01 % (0.1 mg/gram) vaginal cream Place vaginally once daily.      FLUoxetine 40 MG capsule Take 40 mg by mouth once daily.      hydroCHLOROthiazide (HYDRODIURIL) 25 MG tablet Take 25 mg by mouth once daily.      hydrocodone-acetaminophen (HYCET) solution 7.5-325 mg/15mL Take by mouth 4 (four) times daily as needed for Pain.       ipratropium (ATROVENT) 42 mcg (0.06 %) nasal spray 2 sprays by Each Nostril route 4 (four) times daily.      irbesartan (AVAPRO) 150 MG tablet Take 150 mg by mouth every evening.       No current facility-administered medications for this visit.       REVIEW OF SYSTEMS:    GENERAL:  No weight loss, malaise or fevers.  HEENT:  Negative for frequent or significant headaches.  NECK:  Negative for lumps, goiter, pain and significant neck swelling.  RESPIRATORY:  Negative for cough, wheezing or shortness of breath.  CARDIOVASCULAR:  Negative for chest pain, leg swelling or palpitations.  GI:  Negative for abdominal discomfort, blood in stools or black stools or change in bowel habits.  MUSCULOSKELETAL:  See HPI.  SKIN:  Negative for lesions, rash, and itching.  PSYCH:  Negative for sleep disturbance, mood disorder and recent psychosocial stressors.  HEMATOLOGY/LYMPHOLOGY:  Negative for prolonged bleeding, bruising easily or swollen nodes.  NEURO:   No history of headaches, syncope, paralysis, seizures or tremors.  All other reviewed and negative other than HPI.    Past Medical History:  History reviewed. No pertinent past medical history.    Past Surgical History:  Past Surgical History:   Procedure Laterality Date    TRANSFORAMINAL EPIDURAL INJECTION OF STEROID Right 8/14/2023    Procedure: INJECTION, STEROID, EPIDURAL, TRANSFORAMINAL APPROACH, RIGHT T12/L1 SOONER DATE;  Surgeon: Isi Womack MD;  Location: [facility];  Service: Pain Management;  Laterality: Right;       Family History:  History reviewed. No pertinent family history.    Social History:  Social History     Socioeconomic History    Marital status:    Tobacco Use    Smoking status: Former     Types: Cigarettes     Passive exposure: Past    Smokeless tobacco: Never    Tobacco comments:     Quit 20 years ago   Substance and Sexual Activity    Alcohol use: Not Currently    Drug use: Never    Sexual activity: Not Currently        OBJECTIVE:    Exam limited due to virtual visit:  General appearance: Well appearing, in no acute distress, alert and oriented x3.  Psych:  Mood and affect appropriate.    Previous physical exam:  There were no vitals taken for this visit.    PHYSICAL EXAMINATION:    General appearance: Well appearing, in no acute distress, alert and oriented x3.  Psych:  Mood and affect appropriate.  Skin: Skin color, texture, turgor normal, no rashes or lesions, in both upper and lower body.  Head/face:  Atraumatic, normocephalic.   Cor: RRR  Pulm: Symmetric chest rise, no respiratory distress noted.   Back: Straight leg raising in the sitting and supine positions is negative to radicular pain. There is pain with palpation over right thoracic and lumbar facet joints.  Normal range of motion without pain reproduction.  Extremities: No deformities, edema, or skin discoloration. Good capillary refill.  Musculoskeletal: FABERs is positive on the right. Bilateral lower extremity strength is normal and symmetric.  No atrophy or tone abnormalities are noted.  Neuro:  No loss of sensation is noted.  Gait: Normal.    ASSESSMENT: 67 y.o. year old female with back pain, consistent with the followin. Lumbar radiculopathy        2. Lumbar spondylosis        3. DDD (degenerative disc disease), lumbar        4. Thoracic radiculopathy                PLAN:     - Previous imaging was reviewed and discussed with the patient today.    - Schedule for right T12/L1 and L1/2 TF SANJUANA.     - Continue current medications.     - I have stressed the importance of physical activity and a home exercise plan to help with pain and improve health.    - RTC 2 weeks after above procedure.    The above plan and management options were discussed at length with patient. Patient is in agreement with the above and verbalized understanding.    Maggy Weeks  2023

## 2023-11-27 ENCOUNTER — TELEPHONE (OUTPATIENT)
Dept: PAIN MEDICINE | Facility: CLINIC | Age: 68
End: 2023-11-27
Payer: MEDICARE

## 2023-11-27 NOTE — TELEPHONE ENCOUNTER
----- Message from Zakiya Cervantes sent at 11/27/2023  9:09 AM CST -----  Type: Patient Call Back    Who called:pt     What is the request in detail:pt requesting to speak to nurse in regards to when her epidural will be scheduled. Call pt     Can the clinic reply by [facility]?    Would the patient rather a call back or a response via [facility]? call    Best call back number:[phone] (home)       Additional Information:

## 2023-12-14 ENCOUNTER — PATIENT MESSAGE (OUTPATIENT)
Dept: ADMINISTRATIVE | Facility: OTHER | Age: 68
End: 2023-12-14
Payer: MEDICARE

## 2023-12-15 ENCOUNTER — OFFICE VISIT (OUTPATIENT)
Dept: PAIN MEDICINE | Facility: CLINIC | Age: 68
End: 2023-12-15
Payer: MEDICARE

## 2023-12-15 ENCOUNTER — HOSPITAL ENCOUNTER (OUTPATIENT)
Dept: RADIOLOGY | Facility: OTHER | Age: 68
Discharge: HOME | End: 2023-12-15
Attending: NURSE PRACTITIONER
Payer: MEDICARE

## 2023-12-15 VITALS
OXYGEN SATURATION: 100 % | BODY MASS INDEX: 25.97 KG/M2 | HEIGHT: 64 IN | HEART RATE: 65 BPM | RESPIRATION RATE: 18 BRPM | WEIGHT: 152.13 LBS | SYSTOLIC BLOOD PRESSURE: 121 MMHG | DIASTOLIC BLOOD PRESSURE: 68 MMHG | TEMPERATURE: 98 F

## 2023-12-15 DIAGNOSIS — M47.812 CERVICAL SPONDYLOSIS: ICD-10-CM

## 2023-12-15 DIAGNOSIS — M79.18 MYOFASCIAL PAIN: ICD-10-CM

## 2023-12-15 DIAGNOSIS — M47.816 LUMBAR SPONDYLOSIS: ICD-10-CM

## 2023-12-15 DIAGNOSIS — M54.81 OCCIPITAL NEURALGIA OF LEFT SIDE: ICD-10-CM

## 2023-12-15 DIAGNOSIS — M54.16 LUMBAR RADICULOPATHY: ICD-10-CM

## 2023-12-15 DIAGNOSIS — M54.14 THORACIC RADICULOPATHY: Primary | ICD-10-CM

## 2023-12-15 PROCEDURE — 99999 PR PBB SHADOW E&M-EST. PATIENT-LVL V: CPT | Mod: PBBFAC,,, | Performed by: NURSE PRACTITIONER

## 2023-12-15 PROCEDURE — 99213 PR OFFICE/OUTPT VISIT, EST, LEVL III, 20-29 MIN: ICD-10-PCS | Mod: S$GLB,,, | Performed by: NURSE PRACTITIONER

## 2023-12-15 PROCEDURE — 3288F PR FALLS RISK ASSESSMENT DOCUMENTED: ICD-10-PCS | Mod: CPTII,S$GLB,, | Performed by: NURSE PRACTITIONER

## 2023-12-15 PROCEDURE — 3078F PR MOST RECENT DIASTOLIC BLOOD PRESSURE < 80 MM HG: ICD-10-PCS | Mod: CPTII,S$GLB,, | Performed by: NURSE PRACTITIONER

## 2023-12-15 PROCEDURE — 4010F ACE/ARB THERAPY RXD/TAKEN: CPT | Mod: CPTII,S$GLB,, | Performed by: NURSE PRACTITIONER

## 2023-12-15 PROCEDURE — 1101F PT FALLS ASSESS-DOCD LE1/YR: CPT | Mod: CPTII,S$GLB,, | Performed by: NURSE PRACTITIONER

## 2023-12-15 PROCEDURE — 99213 OFFICE O/P EST LOW 20 MIN: CPT | Mod: S$GLB,,, | Performed by: NURSE PRACTITIONER

## 2023-12-15 PROCEDURE — 72052 XR CERVICAL SPINE 5 VIEW WITH FLEX AND EXT: ICD-10-PCS | Mod: 26,,, | Performed by: RADIOLOGY

## 2023-12-15 PROCEDURE — 3008F BODY MASS INDEX DOCD: CPT | Mod: CPTII,S$GLB,, | Performed by: NURSE PRACTITIONER

## 2023-12-15 PROCEDURE — 72052 X-RAY EXAM NECK SPINE 6/>VWS: CPT | Mod: TC,FY

## 2023-12-15 PROCEDURE — 3074F PR MOST RECENT SYSTOLIC BLOOD PRESSURE < 130 MM HG: ICD-10-PCS | Mod: CPTII,S$GLB,, | Performed by: NURSE PRACTITIONER

## 2023-12-15 PROCEDURE — 3008F PR BODY MASS INDEX (BMI) DOCUMENTED: ICD-10-PCS | Mod: CPTII,S$GLB,, | Performed by: NURSE PRACTITIONER

## 2023-12-15 PROCEDURE — 3074F SYST BP LT 130 MM HG: CPT | Mod: CPTII,S$GLB,, | Performed by: NURSE PRACTITIONER

## 2023-12-15 PROCEDURE — 99999 PR PBB SHADOW E&M-EST. PATIENT-LVL V: ICD-10-PCS | Mod: PBBFAC,,, | Performed by: NURSE PRACTITIONER

## 2023-12-15 PROCEDURE — 1101F PR PT FALLS ASSESS DOC 0-1 FALLS W/OUT INJ PAST YR: ICD-10-PCS | Mod: CPTII,S$GLB,, | Performed by: NURSE PRACTITIONER

## 2023-12-15 PROCEDURE — 1160F PR REVIEW ALL MEDS BY PRESCRIBER/CLIN PHARMACIST DOCUMENTED: ICD-10-PCS | Mod: CPTII,S$GLB,, | Performed by: NURSE PRACTITIONER

## 2023-12-15 PROCEDURE — 3078F DIAST BP <80 MM HG: CPT | Mod: CPTII,S$GLB,, | Performed by: NURSE PRACTITIONER

## 2023-12-15 PROCEDURE — 4010F PR ACE/ARB THEARPY RXD/TAKEN: ICD-10-PCS | Mod: CPTII,S$GLB,, | Performed by: NURSE PRACTITIONER

## 2023-12-15 PROCEDURE — 1125F AMNT PAIN NOTED PAIN PRSNT: CPT | Mod: CPTII,S$GLB,, | Performed by: NURSE PRACTITIONER

## 2023-12-15 PROCEDURE — 1125F PR PAIN SEVERITY QUANTIFIED, PAIN PRESENT: ICD-10-PCS | Mod: CPTII,S$GLB,, | Performed by: NURSE PRACTITIONER

## 2023-12-15 PROCEDURE — 72052 X-RAY EXAM NECK SPINE 6/>VWS: CPT | Mod: 26,,, | Performed by: RADIOLOGY

## 2023-12-15 PROCEDURE — 1160F RVW MEDS BY RX/DR IN RCRD: CPT | Mod: CPTII,S$GLB,, | Performed by: NURSE PRACTITIONER

## 2023-12-15 PROCEDURE — 1159F MED LIST DOCD IN RCRD: CPT | Mod: CPTII,S$GLB,, | Performed by: NURSE PRACTITIONER

## 2023-12-15 PROCEDURE — 3288F FALL RISK ASSESSMENT DOCD: CPT | Mod: CPTII,S$GLB,, | Performed by: NURSE PRACTITIONER

## 2023-12-15 PROCEDURE — 1159F PR MEDICATION LIST DOCUMENTED IN MEDICAL RECORD: ICD-10-PCS | Mod: CPTII,S$GLB,, | Performed by: NURSE PRACTITIONER

## 2023-12-15 RX ORDER — METHOCARBAMOL 500 MG/1
500 TABLET, FILM COATED ORAL 3 TIMES DAILY PRN
Qty: 30 TABLET | Refills: 0 | Status: SHIPPED | OUTPATIENT
Start: 2023-12-15 | End: 2023-12-25

## 2023-12-15 NOTE — H&P (VIEW-ONLY)
Chronic patient Established Note (Follow up visit)        SUBJECTIVE:    Interval History 12/15/2023:  The patient returns to clinic today for follow up of back pain. She reports increased neck pain over the last month. She reports neck pain and pain at the base of her head, left side greater than right. This pain is worse with bending forward. She has tried Excedrin and Flexeril with limited relief. She denies any radicular arm pain. She continues to report right sided mid and low back pain. She does have radiating pain into the right groin and anterior thigh. She is scheduled for SANJUANA next week. She continues to perform a home exercise routine. She denies any other health changes. Her pain today is 8/10.     Interval History 11/21/2023:  The patient returns to clinic today for follow up of back pain via virtual visit. She is here today for imaging review. She reports worsened right sided mid and low back pain. This radiates into the right groin and anterior thigh. She denies any left leg pain. Her pain is worse with prolonged walking. She is performing a home exercise routine. She denies any other health changes.     Interval History 8/28/2023:  Rebecca Johnston Zollinger presents to the clinic for a follow-up appointment for back pain via virtual visit. She is s/p right T12/L1 TF SANJUANA on 8/14/2023. She reports 100% relief of her mid back pain. She continues to report low back pain that radiates into her groin bilaterally, right greater than left. This is worse with prolonged walking. She is no longer able to walk 4 miles, which is her typical exercise routine. She has completed multiple rounds of PT. She denies any other health changes.       HPI:  67 year old female who presents with Right sided lower back pain. 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 The pain is located in her R back and goes to the R hip and groin when she  walks. At worst it is a 10/10, baseline is a 3/10. She describes it as aching. She used to be able to walk 4 miles, now she states she can only walk 1 mile without pain. Last saw pain management at  2 years ago, no interventions performed. She has tried multiple rounds of PT, most recently in 2022 with some improvement, completed over 6 weeks of PT. She takes occasional ibuprofen and tylenol. Used to take tramadol and opioids but is no longer taking. She has had an MRI back in 2021 showing protruding disc at T12-L1. She denies any new weakness, denies fevers, numbness, saddle anesthesia or incontinence.     Pain Disability Index Review:      12/15/2023     1:43 PM 7/5/2023     8:27 AM   Last 3 PDI Scores   Pain Disability Index (PDI) 24 52       Pain Medications:  None    Opioid Contract: not applicable     report:  Not applicable    Pain Procedures:   8/14/2023- Right T12/L1 TF SANJUANA    Physical Therapy/Home Exercise: yes    Imaging:  MRI Lumbar Spine 9/22/2023:  COMPARISON:  None.     FINDINGS:  Alignment: Grade 1 retrolisthesis of L5-S1.  Straightening of lumbar lordosis.     Vertebrae: Multilevel degenerative endplate changes no fracture or marrow infiltrative process.     Discs: Moderate to severe disc height loss noted throughout the lumbar spine.  No evidence for discitis.     Cord: Conus terminates at L1-L2 and appears unremarkable.  Cauda equina appears unremarkable.     Degenerative findings:     T11-T12: Right paracentral disc extrusion results in moderate effacement of the right lateral recess and mass effect upon the right T12 nerve root.     T12-L1: Circumferential disc bulge with right paracentral disc extrusion result in mild effacement of the right lateral recess.     L1-L2: Circumferential disc bulge and mild facet arthropathy result in mild bilateral neural foraminal narrowing.     L2-L3: Circumferential disc bulge and mild facet arthropathy result in mild left neural foraminal narrowing.      L3-L4: Circumferential disc bulge and moderate facet arthropathy result in mild spinal canal stenosis and mild bilateral neural foraminal narrowing.     L4-L5: Circumferential disc bulge and mild facet arthropathy result in mild effacement of the lateral recesses and mild bilateral neural foraminal narrowing peer     L5-S1: Circumferential disc bulge and mild facet arthropathy result in moderate right, mild left neural foraminal narrowing.     Paraspinal muscles & soft tissues: Moderate paraspinal muscle atrophy.  4.0 cm left renal cyst.     Impression:     1. Multilevel degenerative changes of the lumbar and lower thoracic spine as detailed above.     Xray Hip 7/5/2023:  FINDINGS:  No acute fractures.  Some degenerative changes visualized lower lumbar spine to include endplate osteophytes and lumbosacral disc narrowing and possible vacuum phenomenon.  Intact right and left SI joints.  No definite narrowing of right or left hip joint spaces or acetabular spurring.  Preserved right and left femoral head contours.     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      aspirin (ECOTRIN) 81 MG EC tablet Take 81 mg by mouth once daily.      azelastine (OPTIVAR) 0.05 % ophthalmic solution 1 drop 2 (two) times daily.      estradioL (ESTRACE) 0.01 % (0.1 mg/gram) vaginal cream Place vaginally once daily.      FLUoxetine 40 MG capsule Take 40 mg by mouth once daily.      hydrocodone-acetaminophen (HYCET) solution 7.5-325 mg/15mL Take by mouth 4 (four) times daily as needed for Pain.      ipratropium (ATROVENT) 42 mcg (0.06 %)  nasal spray 2 sprays by Each Nostril route 4 (four) times daily.      irbesartan (AVAPRO) 150 MG tablet Take 150 mg by mouth every evening.      hydroCHLOROthiazide (HYDRODIURIL) 25 MG tablet Take 25 mg by mouth once daily.       No current facility-administered medications for this visit.       REVIEW OF SYSTEMS:    GENERAL:  No weight loss, malaise or fevers.  HEENT:  Negative for frequent or significant headaches.  NECK:  Negative for lumps, goiter, pain and significant neck swelling.  RESPIRATORY:  Negative for cough, wheezing or shortness of breath.  CARDIOVASCULAR:  Negative for chest pain, leg swelling or palpitations.  GI:  Negative for abdominal discomfort, blood in stools or black stools or change in bowel habits.  MUSCULOSKELETAL:  See HPI.  SKIN:  Negative for lesions, rash, and itching.  PSYCH:  Negative for sleep disturbance, mood disorder and recent psychosocial stressors.  HEMATOLOGY/LYMPHOLOGY:  Negative for prolonged bleeding, bruising easily or swollen nodes.  NEURO:   No history of headaches, syncope, paralysis, seizures or tremors.  All other reviewed and negative other than HPI.    Past Medical History:  History reviewed. No pertinent past medical history.    Past Surgical History:  Past Surgical History:   Procedure Laterality Date    TRANSFORAMINAL EPIDURAL INJECTION OF STEROID Right 8/14/2023    Procedure: INJECTION, STEROID, EPIDURAL, TRANSFORAMINAL APPROACH, RIGHT T12/L1 SOONER DATE;  Surgeon: Isi Womack MD;  Location: [facility], operated by [facility];  Service: Pain Management;  Laterality: Right;       Family History:  History reviewed. No pertinent family history.    Social History:  Social History     Socioeconomic History    Marital status:    Tobacco Use    Smoking status: Former     Types: Cigarettes     Passive exposure: Past    Smokeless tobacco: Never    Tobacco comments:     Quit 20 years ago   Substance and Sexual Activity    Alcohol use: Not Currently    Drug use: Never    Sexual  "activity: Not Currently       OBJECTIVE:      /68   Pulse 65   Temp 98 °F (36.7 °C)   Resp 18   Ht 5' 4" (1.626 m)   Wt 69 kg (152 lb 1.9 oz)   SpO2 100%   BMI 26.11 kg/m²     PHYSICAL EXAMINATION:    General appearance: Well appearing, in no acute distress, alert and oriented x3.  Psych:  Mood and affect appropriate.  Skin: Skin color, texture, turgor normal, no rashes or lesions, in both upper and lower body.  Head/face:  Atraumatic, normocephalic. There is pain with palpation over left occipital protuberance.   Neck: There is pain with palpation over cervical paraspinals on the left. Spurling Negative bilaterally. Full ROM with pain on flexion and extension.   Cor: RRR  Pulm: Symmetric chest rise, no respiratory distress noted.   Back: Straight leg raising in the sitting and supine positions is negative to radicular pain. There is pain with palpation over right thoracic and lumbar facet joints.  Normal range of motion without pain reproduction.  Extremities: No deformities, edema, or skin discoloration. Good capillary refill.  Musculoskeletal: FABERs is positive on the right. Bilateral lower extremity strength is normal and symmetric.  No atrophy or tone abnormalities are noted.  Neuro:  No loss of sensation is noted.  Gait: Normal.    ASSESSMENT: 67 y.o. year old female with back pain, consistent with the followin. Thoracic radiculopathy        2. Lumbar spondylosis        3. Lumbar radiculopathy        4. Myofascial pain        5. Cervical spondylosis  X-Ray Cervical Spine 5 View W Flex Extxt    Ambulatory referral/consult to Physical/Occupational Therapy      6. Occipital neuralgia of left side                  PLAN:     - Previous imaging was reviewed and discussed with the patient today.    - She is scheduled for right T12/L1 and L1/2 TF SANJUANA next week.    - Obtain cervical xray.     - Consult physical therapy.     - I have stressed the importance of physical activity and a home exercise " plan to help with pain and improve health.    - Robaxin 500 mg TID PRN muscle pain.     - If limited relief, will obtain cervical MRI.     - RTC 2 weeks after above procedure.    The above plan and management options were discussed at length with patient. Patient is in agreement with the above and verbalized understanding.    Maggy Weeks  12/15/2023

## 2023-12-15 NOTE — PROGRESS NOTES
Chronic patient Established Note (Follow up visit)        SUBJECTIVE:    Interval History 12/15/2023:  The patient returns to clinic today for follow up of back pain. She reports increased neck pain over the last month. She reports neck pain and pain at the base of her head, left side greater than right. This pain is worse with bending forward. She has tried Excedrin and Flexeril with limited relief. She denies any radicular arm pain. She continues to report right sided mid and low back pain. She does have radiating pain into the right groin and anterior thigh. She is scheduled for SANJUANA next week. She continues to perform a home exercise routine. She denies any other health changes. Her pain today is 8/10.     Interval History 11/21/2023:  The patient returns to clinic today for follow up of back pain via virtual visit. She is here today for imaging review. She reports worsened right sided mid and low back pain. This radiates into the right groin and anterior thigh. She denies any left leg pain. Her pain is worse with prolonged walking. She is performing a home exercise routine. She denies any other health changes.     Interval History 8/28/2023:  Rebecca Johnston Zollinger presents to the clinic for a follow-up appointment for back pain via virtual visit. She is s/p right T12/L1 TF SANJUANA on 8/14/2023. She reports 100% relief of her mid back pain. She continues to report low back pain that radiates into her groin bilaterally, right greater than left. This is worse with prolonged walking. She is no longer able to walk 4 miles, which is her typical exercise routine. She has completed multiple rounds of PT. She denies any other health changes.       HPI:  67 year old female who presents with Right sided lower back pain. 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 The pain is located in her R back and goes to the R hip and groin when she  walks. At worst it is a 10/10, baseline is a 3/10. She describes it as aching. She used to be able to walk 4 miles, now she states she can only walk 1 mile without pain. Last saw pain management at  2 years ago, no interventions performed. She has tried multiple rounds of PT, most recently in 2022 with some improvement, completed over 6 weeks of PT. She takes occasional ibuprofen and tylenol. Used to take tramadol and opioids but is no longer taking. She has had an MRI back in 2021 showing protruding disc at T12-L1. She denies any new weakness, denies fevers, numbness, saddle anesthesia or incontinence.     Pain Disability Index Review:      12/15/2023     1:43 PM 7/5/2023     8:27 AM   Last 3 PDI Scores   Pain Disability Index (PDI) 24 52       Pain Medications:  None    Opioid Contract: not applicable     report:  Not applicable    Pain Procedures:   8/14/2023- Right T12/L1 TF SANJUANA    Physical Therapy/Home Exercise: yes    Imaging:  MRI Lumbar Spine 9/22/2023:  COMPARISON:  None.     FINDINGS:  Alignment: Grade 1 retrolisthesis of L5-S1.  Straightening of lumbar lordosis.     Vertebrae: Multilevel degenerative endplate changes no fracture or marrow infiltrative process.     Discs: Moderate to severe disc height loss noted throughout the lumbar spine.  No evidence for discitis.     Cord: Conus terminates at L1-L2 and appears unremarkable.  Cauda equina appears unremarkable.     Degenerative findings:     T11-T12: Right paracentral disc extrusion results in moderate effacement of the right lateral recess and mass effect upon the right T12 nerve root.     T12-L1: Circumferential disc bulge with right paracentral disc extrusion result in mild effacement of the right lateral recess.     L1-L2: Circumferential disc bulge and mild facet arthropathy result in mild bilateral neural foraminal narrowing.     L2-L3: Circumferential disc bulge and mild facet arthropathy result in mild left neural foraminal narrowing.      L3-L4: Circumferential disc bulge and moderate facet arthropathy result in mild spinal canal stenosis and mild bilateral neural foraminal narrowing.     L4-L5: Circumferential disc bulge and mild facet arthropathy result in mild effacement of the lateral recesses and mild bilateral neural foraminal narrowing peer     L5-S1: Circumferential disc bulge and mild facet arthropathy result in moderate right, mild left neural foraminal narrowing.     Paraspinal muscles & soft tissues: Moderate paraspinal muscle atrophy.  4.0 cm left renal cyst.     Impression:     1. Multilevel degenerative changes of the lumbar and lower thoracic spine as detailed above.     Xray Hip 7/5/2023:  FINDINGS:  No acute fractures.  Some degenerative changes visualized lower lumbar spine to include endplate osteophytes and lumbosacral disc narrowing and possible vacuum phenomenon.  Intact right and left SI joints.  No definite narrowing of right or left hip joint spaces or acetabular spurring.  Preserved right and left femoral head contours.     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      aspirin (ECOTRIN) 81 MG EC tablet Take 81 mg by mouth once daily.      azelastine (OPTIVAR) 0.05 % ophthalmic solution 1 drop 2 (two) times daily.      estradioL (ESTRACE) 0.01 % (0.1 mg/gram) vaginal cream Place vaginally once daily.      FLUoxetine 40 MG capsule Take 40 mg by mouth once daily.      hydrocodone-acetaminophen (HYCET) solution 7.5-325 mg/15mL Take by mouth 4 (four) times daily as needed for Pain.      ipratropium (ATROVENT) 42 mcg (0.06 %)  nasal spray 2 sprays by Each Nostril route 4 (four) times daily.      irbesartan (AVAPRO) 150 MG tablet Take 150 mg by mouth every evening.      hydroCHLOROthiazide (HYDRODIURIL) 25 MG tablet Take 25 mg by mouth once daily.       No current facility-administered medications for this visit.       REVIEW OF SYSTEMS:    GENERAL:  No weight loss, malaise or fevers.  HEENT:  Negative for frequent or significant headaches.  NECK:  Negative for lumps, goiter, pain and significant neck swelling.  RESPIRATORY:  Negative for cough, wheezing or shortness of breath.  CARDIOVASCULAR:  Negative for chest pain, leg swelling or palpitations.  GI:  Negative for abdominal discomfort, blood in stools or black stools or change in bowel habits.  MUSCULOSKELETAL:  See HPI.  SKIN:  Negative for lesions, rash, and itching.  PSYCH:  Negative for sleep disturbance, mood disorder and recent psychosocial stressors.  HEMATOLOGY/LYMPHOLOGY:  Negative for prolonged bleeding, bruising easily or swollen nodes.  NEURO:   No history of headaches, syncope, paralysis, seizures or tremors.  All other reviewed and negative other than HPI.    Past Medical History:  History reviewed. No pertinent past medical history.    Past Surgical History:  Past Surgical History:   Procedure Laterality Date    TRANSFORAMINAL EPIDURAL INJECTION OF STEROID Right 8/14/2023    Procedure: INJECTION, STEROID, EPIDURAL, TRANSFORAMINAL APPROACH, RIGHT T12/L1 SOONER DATE;  Surgeon: Isi Womack MD;  Location: [facility];  Service: Pain Management;  Laterality: Right;       Family History:  History reviewed. No pertinent family history.    Social History:  Social History     Socioeconomic History    Marital status:    Tobacco Use    Smoking status: Former     Types: Cigarettes     Passive exposure: Past    Smokeless tobacco: Never    Tobacco comments:     Quit 20 years ago   Substance and Sexual Activity    Alcohol use: Not Currently    Drug use: Never    Sexual  "activity: Not Currently       OBJECTIVE:      /68   Pulse 65   Temp 98 °F (36.7 °C)   Resp 18   Ht 5' 4" (1.626 m)   Wt 69 kg (152 lb 1.9 oz)   SpO2 100%   BMI 26.11 kg/m²     PHYSICAL EXAMINATION:    General appearance: Well appearing, in no acute distress, alert and oriented x3.  Psych:  Mood and affect appropriate.  Skin: Skin color, texture, turgor normal, no rashes or lesions, in both upper and lower body.  Head/face:  Atraumatic, normocephalic. There is pain with palpation over left occipital protuberance.   Neck: There is pain with palpation over cervical paraspinals on the left. Spurling Negative bilaterally. Full ROM with pain on flexion and extension.   Cor: RRR  Pulm: Symmetric chest rise, no respiratory distress noted.   Back: Straight leg raising in the sitting and supine positions is negative to radicular pain. There is pain with palpation over right thoracic and lumbar facet joints.  Normal range of motion without pain reproduction.  Extremities: No deformities, edema, or skin discoloration. Good capillary refill.  Musculoskeletal: FABERs is positive on the right. Bilateral lower extremity strength is normal and symmetric.  No atrophy or tone abnormalities are noted.  Neuro:  No loss of sensation is noted.  Gait: Normal.    ASSESSMENT: 67 y.o. year old female with back pain, consistent with the followin. Thoracic radiculopathy        2. Lumbar spondylosis        3. Lumbar radiculopathy        4. Myofascial pain        5. Cervical spondylosis  X-Ray Cervical Spine 5 View W Flex Extxt    Ambulatory referral/consult to Physical/Occupational Therapy      6. Occipital neuralgia of left side                  PLAN:     - Previous imaging was reviewed and discussed with the patient today.    - She is scheduled for right T12/L1 and L1/2 TF SANJUANA next week.    - Obtain cervical xray.     - Consult physical therapy.     - I have stressed the importance of physical activity and a home exercise " plan to help with pain and improve health.    - Robaxin 500 mg TID PRN muscle pain.     - If limited relief, will obtain cervical MRI.     - RTC 2 weeks after above procedure.    The above plan and management options were discussed at length with patient. Patient is in agreement with the above and verbalized understanding.    Maggy Weeks  12/15/2023

## 2023-12-18 ENCOUNTER — HOSPITAL ENCOUNTER (OUTPATIENT)
Facility: OTHER | Age: 68
Discharge: HOME | End: 2023-12-18
Attending: ANESTHESIOLOGY | Admitting: ANESTHESIOLOGY
Payer: MEDICARE

## 2023-12-18 VITALS
HEART RATE: 60 BPM | HEIGHT: 64 IN | RESPIRATION RATE: 16 BRPM | OXYGEN SATURATION: 98 % | WEIGHT: 150 LBS | BODY MASS INDEX: 25.61 KG/M2 | DIASTOLIC BLOOD PRESSURE: 73 MMHG | SYSTOLIC BLOOD PRESSURE: 152 MMHG | TEMPERATURE: 98 F

## 2023-12-18 DIAGNOSIS — M51.36 DDD (DEGENERATIVE DISC DISEASE), LUMBAR: Primary | ICD-10-CM

## 2023-12-18 DIAGNOSIS — G89.29 CHRONIC PAIN: ICD-10-CM

## 2023-12-18 DIAGNOSIS — M54.16 LUMBAR RADICULOPATHY: ICD-10-CM

## 2023-12-18 PROCEDURE — 64484 NJX AA&/STRD TFRM EPI L/S EA: CPT | Mod: RT | Performed by: ANESTHESIOLOGY

## 2023-12-18 PROCEDURE — 25500020 PHARM REV CODE 255: Performed by: ANESTHESIOLOGY

## 2023-12-18 PROCEDURE — 64483 NJX AA&/STRD TFRM EPI L/S 1: CPT | Mod: RT,,, | Performed by: ANESTHESIOLOGY

## 2023-12-18 PROCEDURE — 64483 PR EPIDURAL INJ, ANES/STEROID, TRANSFORAMINAL, LUMB/SACR, SNGL LEVL: ICD-10-PCS | Mod: RT,,, | Performed by: ANESTHESIOLOGY

## 2023-12-18 PROCEDURE — 99152 MOD SED SAME PHYS/QHP 5/>YRS: CPT | Performed by: ANESTHESIOLOGY

## 2023-12-18 PROCEDURE — 25000003 PHARM REV CODE 250: Performed by: ANESTHESIOLOGY

## 2023-12-18 PROCEDURE — 63600175 PHARM REV CODE 636 W HCPCS: Performed by: ANESTHESIOLOGY

## 2023-12-18 PROCEDURE — 64484 PRA INJECT ANES/STEROID FORAMEN LUMBAR/SACRAL W IMG GUIDE ,EA ADD LEVEL: ICD-10-PCS | Mod: RT,,, | Performed by: ANESTHESIOLOGY

## 2023-12-18 PROCEDURE — 64484 NJX AA&/STRD TFRM EPI L/S EA: CPT | Mod: RT,,, | Performed by: ANESTHESIOLOGY

## 2023-12-18 PROCEDURE — 64483 NJX AA&/STRD TFRM EPI L/S 1: CPT | Mod: RT | Performed by: ANESTHESIOLOGY

## 2023-12-18 RX ORDER — DEXAMETHASONE SODIUM PHOSPHATE 10 MG/ML
INJECTION INTRAMUSCULAR; INTRAVENOUS
Status: DISCONTINUED | OUTPATIENT
Start: 2023-12-18 | End: 2023-12-18 | Stop reason: HOSPADM

## 2023-12-18 RX ORDER — LIDOCAINE HYDROCHLORIDE 20 MG/ML
INJECTION, SOLUTION INFILTRATION; PERINEURAL
Status: DISCONTINUED | OUTPATIENT
Start: 2023-12-18 | End: 2023-12-18 | Stop reason: HOSPADM

## 2023-12-18 RX ORDER — MIDAZOLAM HYDROCHLORIDE 1 MG/ML
INJECTION INTRAMUSCULAR; INTRAVENOUS
Status: DISCONTINUED | OUTPATIENT
Start: 2023-12-18 | End: 2023-12-18 | Stop reason: HOSPADM

## 2023-12-18 RX ORDER — LIDOCAINE HYDROCHLORIDE 10 MG/ML
INJECTION, SOLUTION EPIDURAL; INFILTRATION; INTRACAUDAL; PERINEURAL
Status: DISCONTINUED | OUTPATIENT
Start: 2023-12-18 | End: 2023-12-18 | Stop reason: HOSPADM

## 2023-12-18 RX ORDER — SODIUM CHLORIDE 9 MG/ML
INJECTION, SOLUTION INTRAVENOUS CONTINUOUS
Status: DISCONTINUED | OUTPATIENT
Start: 2023-12-18 | End: 2023-12-18 | Stop reason: HOSPADM

## 2023-12-18 NOTE — DISCHARGE INSTRUCTIONS

## 2023-12-18 NOTE — OP NOTE
Lumbar Transforaminal Epidural Steroid Injection under Fluoroscopic Guidance    The procedure, risks, benefits, and options were discussed with the patient. There are no contraindications to the procedure. The patent expressed understanding and agreed to the procedure. Informed written consent was obtained prior to the start of the procedure and can be found in the patient's chart.    PATIENT NAME: Rebecca Zollinger   MRN: [id number]     DATE OF PROCEDURE: 12/18/2023    PROCEDURE:  Right  T12/L1 & L1/2 Lumbar Transforaminal Epidural Steroid Injection under Fluoroscopic Guidance    PRE-OP DIAGNOSIS: Lumbar radiculopathy [M54.16]  Thoracic radiculopathy [M54.14] Lumbar radiculopathy [M54.16]    POST-OP DIAGNOSIS: Same    PHYSICIAN: Isi Womack MD    ASSISTANTS: Ty Hobson MD  [employer] pain fellow      MEDICATIONS INJECTED: Preservative-free Decadron 10mg with 5cc of Lidocaine 1% MPF     LOCAL ANESTHETIC INJECTED: Xylocaine 2%     SEDATION: Versed 1mg and Fentanyl 0mcg                                                                                                                                                                                     Conscious sedation ordered by M.D. Patient re-evaluation prior to administration of conscious sedation. No changes noted in patient's status from initial evaluation. The patient's vital signs were monitored by RN and patient remained hemodynamically stable throughout the procedure.    Event Time In   Sedation Start 0942   Sedation End 0953       ESTIMATED BLOOD LOSS: None    COMPLICATIONS: None    TECHNIQUE: Time-out was performed to identify the patient and procedure to be performed. With the patient laying in a prone position, the surgical area was prepped and draped in the usual sterile fashion using ChloraPrep and a fenestrated drape.The levels were determined under fluoroscopy guidance. Skin anesthesia was achieved by injecting Lidocaine 2% over the injection sites. The  transforaminal spaces were then approached with a 22 gauge, 3.5 inch spinal quinke needle that was introduced under fluoroscopic guidance in the AP and Lateral views. Once the needle tip was in the area of the transforaminal space, and there was no blood, CSF or paraesthesias, contrast dye Omnipaque (300mg/mL) was injected to confirm placement and there was no vascular runoff. Fluoroscopic imaging in the AP and lateral views revealed a clear outline of the spinal nerve with proximal spread of agent through the neural foramen into the epidural space. 3 mL of the medication mixture listed above was injected slowly at each site. Displacement of the radio opaque contrast after injection of the medication confirmed that the medication went into the area of the transforaminal spaces. The needles were removed and bleeding was nil. A sterile dressing was applied. No specimens collected. The patient tolerated the procedure well.     PRE-PROCEDURE PAIN SCORE: 7/10    POST-PROCEDURE PAIN SCORE: 0/10    The patient was monitored after the procedure in the recovery area. They were given post-procedure and discharge instructions to follow at home. The patient was discharged in a stable condition.        Isi Womack MD

## 2023-12-18 NOTE — DISCHARGE SUMMARY
Discharge Note  Short Stay      SUMMARY     Admit Date: 12/18/2023    Attending Physician: Isi Womack      Discharge Physician: Isi Womack      Discharge Date: 12/18/2023 9:43 AM    Procedure(s) (LRB):  LUMBAR TRANSFORAMINAL RIGHT T12/L1 AND L1/2 (Right)    Final Diagnosis: Lumbar radiculopathy [M54.16]  Thoracic radiculopathy [M54.14]    Disposition: Home or self care    Patient Instructions:   Current Discharge Medication List        CONTINUE these medications which have NOT CHANGED    Details   alendronate (FOSAMAX) 70 MG tablet Take 70 mg by mouth every 7 days.      aspirin (ECOTRIN) 81 MG EC tablet Take 81 mg by mouth once daily.      azelastine (OPTIVAR) 0.05 % ophthalmic solution 1 drop 2 (two) times daily.      estradioL (ESTRACE) 0.01 % (0.1 mg/gram) vaginal cream Place vaginally once daily.      FLUoxetine 40 MG capsule Take 40 mg by mouth once daily.      hydroCHLOROthiazide (HYDRODIURIL) 25 MG tablet Take 25 mg by mouth once daily.      hydrocodone-acetaminophen (HYCET) solution 7.5-325 mg/15mL Take by mouth 4 (four) times daily as needed for Pain.      ipratropium (ATROVENT) 42 mcg (0.06 %) nasal spray 2 sprays by Each Nostril route 4 (four) times daily.      irbesartan (AVAPRO) 150 MG tablet Take 150 mg by mouth every evening.      methocarbamoL (ROBAXIN) 500 MG Tab Take 1 tablet (500 mg total) by mouth 3 (three) times daily as needed (muscle pain).  Qty: 30 tablet, Refills: 0    Associated Diagnoses: Myofascial pain                 Discharge Diagnosis: Lumbar radiculopathy [M54.16]  Thoracic radiculopathy [M54.14]  Condition on Discharge: Stable with no complications to procedure   Diet on Discharge: Same as before.  Activity: as per instruction sheet.  Discharge to: Home with a responsible adult.  Follow up: 2-4 weeks       Please call my office or pager at [phone] if experienced any weakness or loss of sensation, fever > 101.5, pain uncontrolled with oral medications, persistent  nausea/vomiting/or diarrhea, redness or drainage from the incisions, or any other worrisome concerns. If physician on call was not reached or could not communicate with our office for any reason please go to the nearest emergency department

## 2023-12-19 ENCOUNTER — TELEPHONE (OUTPATIENT)
Dept: PAIN MEDICINE | Facility: CLINIC | Age: 68
End: 2023-12-19
Payer: MEDICARE

## 2023-12-19 DIAGNOSIS — M54.14 THORACIC RADICULOPATHY: Primary | ICD-10-CM

## 2023-12-19 DIAGNOSIS — M79.18 MYOFASCIAL PAIN: ICD-10-CM

## 2023-12-19 DIAGNOSIS — M47.812 CERVICAL SPONDYLOSIS: ICD-10-CM

## 2023-12-19 NOTE — TELEPHONE ENCOUNTER
----- Message from Erick Ahuja MA sent at 2023 12:59 PM CST -----  Regarding: MRN: [id number]  Please assist.  ----- Message -----  From: Rosaura Cruz MA  Sent: 2023  11:06 AM CST  To: [facility] Winter Staff    Name of Who is Callin                What is the request in detail: Pt would like to have deep needling added to PT order. Please assist.                Can the clinic reply by [facility]: No                What Number to [facility] Back [facility]: [phone]

## 2024-01-10 ENCOUNTER — OFFICE VISIT (OUTPATIENT)
Dept: PAIN MEDICINE | Facility: CLINIC | Age: 69
End: 2024-01-10
Attending: ANESTHESIOLOGY
Payer: MEDICARE

## 2024-01-10 VITALS
HEART RATE: 63 BPM | HEIGHT: 64 IN | BODY MASS INDEX: 25.97 KG/M2 | DIASTOLIC BLOOD PRESSURE: 75 MMHG | TEMPERATURE: 98 F | RESPIRATION RATE: 18 BRPM | SYSTOLIC BLOOD PRESSURE: 123 MMHG | WEIGHT: 152.13 LBS | OXYGEN SATURATION: 100 %

## 2024-01-10 DIAGNOSIS — M71.9 CERVICOTHORACIC INTERSPINOUS BURSITIS: ICD-10-CM

## 2024-01-10 DIAGNOSIS — M79.18 MYOFASCIAL PAIN: ICD-10-CM

## 2024-01-10 DIAGNOSIS — M47.812 CERVICAL SPONDYLOSIS: ICD-10-CM

## 2024-01-10 DIAGNOSIS — M50.30 DDD (DEGENERATIVE DISC DISEASE), CERVICAL: ICD-10-CM

## 2024-01-10 DIAGNOSIS — M47.26 OSTEOARTHRITIS OF SPINE WITH RADICULOPATHY, LUMBAR REGION: ICD-10-CM

## 2024-01-10 DIAGNOSIS — M54.6 BILATERAL THORACIC BACK PAIN, UNSPECIFIED CHRONICITY: ICD-10-CM

## 2024-01-10 DIAGNOSIS — M47.892 OTHER OSTEOARTHRITIS OF SPINE, CERVICAL REGION: Primary | ICD-10-CM

## 2024-01-10 PROCEDURE — 99999 PR PBB SHADOW E&M-EST. PATIENT-LVL IV: CPT | Mod: PBBFAC,,, | Performed by: ANESTHESIOLOGY

## 2024-01-10 PROCEDURE — 99214 OFFICE O/P EST MOD 30 MIN: CPT | Mod: S$GLB,,, | Performed by: ANESTHESIOLOGY

## 2024-01-10 NOTE — PROGRESS NOTES
"Chronic patient Established Note (Follow up visit)        SUBJECTIVE:    Interval History 1/10/2024:  Rebecca Johnston Zollinger returns to clinic today s/p Right T12/L1 and L1/L2 TA SANJUANA on 12/18/2023.  She reports 85% relief that is ongoing.  She is back to walking 2 miles again.  She does find that when she exerts herself to her full extent that the pain returns slightly.  Today she continues to complain of neck pain from her previous visit and of new mid back pain.  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  She states that the pain is located bilaterally at the base of her skull and into her neck, left greater than right.  She describes the pain as pressure and it feels like "a clamp". Sometimes she has a headache by the end of the day.  The mid back pain is occasionally present in the AM and is relieve by stretching.  It is exacerbated by aerobic activity and pickleball.  The pain will wrap around right side of ribs and under her breast. .  She has not started  PT yet-she was supposed to start yesterday, but the appointment was cancelled due to weather. Denies bowel and bladder dysfunction, fever, chills, nightsweats or weight changes. She continues Robaxin Pain today is 5/10.    She denies any new weakness, denies fevers, numbness, saddle anesthesia or incontinence.     Interval History 12/15/2023:  The patient returns to clinic today for follow up of back pain. She reports increased neck pain over the last month. She reports neck pain and pain at the base of her head, left side greater than right. This pain is worse with bending forward. She has tried Excedrin and Flexeril with limited relief. She denies any radicular arm pain. She continues to report right sided mid and low back pain. She does have radiating pain into the right groin and anterior thigh. She is scheduled for SANJUANA next week. She continues to perform a " home exercise routine. She denies any other health changes. Her pain today is 8/10.     Interval History 11/21/2023:  The patient returns to clinic today for follow up of back pain via virtual visit. She is here today for imaging review. She reports worsened right sided mid and low back pain. This radiates into the right groin and anterior thigh. She denies any left leg pain. Her pain is worse with prolonged walking. She is performing a home exercise routine. She denies any other health changes.     Interval History 8/28/2023:  Rebecca Johnston Zollinger presents to the clinic for a follow-up appointment for back pain via virtual visit. She is s/p right T12/L1 TF SANJUANA on 8/14/2023. She reports 100% relief of her mid back pain. She continues to report low back pain that radiates into her groin bilaterally, right greater than left. This is worse with prolonged walking. She is no longer able to walk 4 miles, which is her typical exercise routine. She has completed multiple rounds of PT. She denies any other health changes.       HPI:  67 year old female who presents with Right sided lower back pain. 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 The pain is located in her R back and goes to the R hip and groin when she walks. At worst it is a 10/10, baseline is a 3/10. She describes it as aching. She used to be able to walk 4 miles, now she states she can only walk 1 mile without pain. Last saw pain management at  2 years ago, no interventions performed. She has tried multiple rounds of PT, most recently in 2022 with some improvement, completed over 6 weeks of PT. She takes occasional ibuprofen and tylenol. Used to take tramadol and opioids but is no longer taking. She has had an MRI back in 2021 showing protruding disc at T12-L1. She denies any new weakness, denies fevers, numbness, saddle anesthesia or incontinence.     Pain  Disability Index Review:      12/15/2023     1:43 PM 7/5/2023     8:27 AM   Last 3 PDI Scores   Pain Disability Index (PDI) 24 52       Pain Medications:  None    Opioid Contract: not applicable     report:  Not applicable    Pain Procedures:   8/14/2023- Right T12/L1 TF SANJUANA    Physical Therapy/Home Exercise: yes    Imaging:    XR CERVICAL SPINE 5 VIEW WITH FLEX AND EXT-12/15/2023     CLINICAL HISTORY:  Spondylosis without myelopathy or radiculopathy, cervical region     TECHNIQUE:  Five views of the cervical spine plus flexion and extension views were performed.     COMPARISON:  None.     FINDINGS:  Vertebral body heights are maintained.     Disc space narrowing and endplate changes C5-6 and C6-7.  Small posterior osteophytes at these levels.     Oblique views show bony narrowing of the neural foramina C5-6 on the right and C4-5 and C5-6 on the left.     Reversal of the normal cervical lordosis in the lower cervical spine.  Otherwise, AP alignment appears anatomic.     No significant prevertebral soft tissue edema.     Impression:     Degenerative change as above.       MRI Lumbar Spine 9/22/2023:  COMPARISON:  None.     FINDINGS:  Alignment: Grade 1 retrolisthesis of L5-S1.  Straightening of lumbar lordosis.     Vertebrae: Multilevel degenerative endplate changes no fracture or marrow infiltrative process.     Discs: Moderate to severe disc height loss noted throughout the lumbar spine.  No evidence for discitis.     Cord: Conus terminates at L1-L2 and appears unremarkable.  Cauda equina appears unremarkable.     Degenerative findings:     T11-T12: Right paracentral disc extrusion results in moderate effacement of the right lateral recess and mass effect upon the right T12 nerve root.     T12-L1: Circumferential disc bulge with right paracentral disc extrusion result in mild effacement of the right lateral recess.     L1-L2: Circumferential disc bulge and mild facet arthropathy result in mild bilateral neural  foraminal narrowing.     L2-L3: Circumferential disc bulge and mild facet arthropathy result in mild left neural foraminal narrowing.     L3-L4: Circumferential disc bulge and moderate facet arthropathy result in mild spinal canal stenosis and mild bilateral neural foraminal narrowing.     L4-L5: Circumferential disc bulge and mild facet arthropathy result in mild effacement of the lateral recesses and mild bilateral neural foraminal narrowing peer     L5-S1: Circumferential disc bulge and mild facet arthropathy result in moderate right, mild left neural foraminal narrowing.     Paraspinal muscles & soft tissues: Moderate paraspinal muscle atrophy.  4.0 cm left renal cyst.     Impression:     1. Multilevel degenerative changes of the lumbar and lower thoracic spine as detailed above.     Xray Hip 7/5/2023:  FINDINGS:  No acute fractures.  Some degenerative changes visualized lower lumbar spine to include endplate osteophytes and lumbosacral disc narrowing and possible vacuum phenomenon.  Intact right and left SI joints.  No definite narrowing of right or left hip joint spaces or acetabular spurring.  Preserved right and left femoral head contours.     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      aspirin (ECOTRIN) 81 MG EC tablet Take 81 mg by mouth once daily.      azelastine (OPTIVAR) 0.05 % ophthalmic solution 1 drop 2 (two) times daily.      estradioL (ESTRACE) 0.01 % (0.1 mg/gram) vaginal cream Place vaginally once daily.      FLUoxetine 40 MG capsule Take 40 mg by mouth once daily.      hydroCHLOROthiazide  (HYDRODIURIL) 25 MG tablet Take 25 mg by mouth once daily.      hydrocodone-acetaminophen (HYCET) solution 7.5-325 mg/15mL Take by mouth 4 (four) times daily as needed for Pain.      ipratropium (ATROVENT) 42 mcg (0.06 %) nasal spray 2 sprays by Each Nostril route 4 (four) times daily.      irbesartan (AVAPRO) 150 MG tablet Take 150 mg by mouth every evening.       No current facility-administered medications for this visit.       REVIEW OF SYSTEMS:    GENERAL:  No weight loss, malaise or fevers.  HEENT:  Negative for frequent or significant headaches.  NECK:  Negative for lumps, goiter, pain and significant neck swelling.  RESPIRATORY:  Negative for cough, wheezing or shortness of breath.  CARDIOVASCULAR:  Negative for chest pain, leg swelling or palpitations.  GI:  Negative for abdominal discomfort, blood in stools or black stools or change in bowel habits.  MUSCULOSKELETAL:  See HPI.  SKIN:  Negative for lesions, rash, and itching.  PSYCH:  Negative for sleep disturbance, mood disorder and recent psychosocial stressors.  HEMATOLOGY/LYMPHOLOGY:  Negative for prolonged bleeding, bruising easily or swollen nodes.  NEURO:   No history of headaches, syncope, paralysis, seizures or tremors.  All other reviewed and negative other than HPI.    Past Medical History:  No past medical history on file.    Past Surgical History:  Past Surgical History:   Procedure Laterality Date    TRANSFORAMINAL EPIDURAL INJECTION OF STEROID Right 8/14/2023    Procedure: INJECTION, STEROID, EPIDURAL, TRANSFORAMINAL APPROACH, RIGHT T12/L1 SOONER DATE;  Surgeon: Isi Womack MD;  Location: [facility];  Service: Pain Management;  Laterality: Right;    TRANSFORAMINAL EPIDURAL INJECTION OF STEROID Right 12/18/2023    Procedure: LUMBAR TRANSFORAMINAL RIGHT T12/L1 AND L1/2;  Surgeon: Isi Womack MD;  Location: [facility];  Service: Pain Management;  Laterality: Right;  [phone]  2 WK F/U DEEPALI       Family History:  No family history on  "file.    Social History:  Social History     Socioeconomic History    Marital status:    Tobacco Use    Smoking status: Former     Types: Cigarettes     Passive exposure: Past    Smokeless tobacco: Never    Tobacco comments:     Quit 20 years ago   Substance and Sexual Activity    Alcohol use: Not Currently    Drug use: Never    Sexual activity: Not Currently       OBJECTIVE:      /75   Pulse 63   Temp 98 °F (36.7 °C) (Oral)   Resp 18   Ht 5' 4" (1.626 m)   Wt 69 kg (152 lb 1.9 oz)   SpO2 100%   BMI 26.11 kg/m²     PHYSICAL EXAMINATION:    General appearance: Well appearing, in no acute distress, alert and oriented x3.  Psych:  Mood and affect appropriate.  Skin: Skin color, texture, turgor normal, no rashes or lesions, in both upper and lower body.  Head/face:  Atraumatic, normocephalic.    Neck: There is pain with palpation over left splenius capitus and upper trapezius. There is pain with palpation over the cervicothroacic interspinus ligament. Spurling Negative bilaterally. Full ROM to the right, limited ROM to the left,  pain on flexion and extension.   Cor: Capillary refill <3 seconds.  Pulm: Symmetric chest rise, no respiratory distress noted.   Back: Straight leg raising in the sitting and supine positions is negative to radicular pain. There is no pain with palpation over thoracic and lumbar facet joints.  Normal ROM with pain reproduction to the right mid thoracic paraspinals.  Extremities: No deformities, edema, or skin discoloration.   Musculoskeletal: FABERs is negative bilaterally.  Bilateral lower extremity strength is normal and symmetric.  No atrophy or tone abnormalities are noted.  Neuro:  No loss of sensation is noted.  Bilateral upper and lower extremity reflexes tested and are equal and symmetric. Plantar reflexes downgoing.  No clonus noted.  Gait: Normal.    ASSESSMENT: 68 y.o. year old female with back and neck pain, consistent with the followin. Other " osteoarthritis of spine, cervical region        2. Myofascial pain        3. Bilateral thoracic back pain, unspecified chronicity        4. Cervicothoracic interspinous bursitis        5. DDD (degenerative disc disease), cervical        6. Cervical spondylosis        7. Osteoarthritis of spine with radiculopathy, lumbar region            PLAN:   We discussed with the patient the assessment and recommendations. The following is the plan we agreed on:   - Previous imaging was reviewed with independent interpretation.  It was reviewed and discussed with the patient today.  Imaging:   X-ray of cervical spine from 12/15/2023  Our interpretation: Disc space narrowing at Left C1/C2, and Bilateral C5/6 and C6/7. Multilevel degenerative joint changes.  Multilevel spurring. No significant changes of normal cervical lordosis.    - Patient to start physical therapy-Referral order modified to include thoracic pain and to add dry needling to treatment plan.    - I have stressed the importance of physical activity and a home exercise plan to help with pain and improve health.    - Continue Robaxin 500 mg TID PRN muscle pain.     - If limited relief, consider left splenius capitus and thoracic paraspinus and interspinus ligament injection/triggerpoint injections of 4 mg dexamethasone.    - RTC 6 weeks for follow-up and injections if needed.    The above plan and management options were discussed at length with patient. Patient is in agreement with the above and verbalized understanding.    Soraya Brown  01/10/2024    I spent a total of 30 minutes on the day of the visit.  This includes face to face time and non-face to face time preparing to see the patient by reviewing previous labs/imaging, obtaining and/or reviewing separately obtained history, documenting clinical information in the electronic or other health record, independently interpreting results and communicating results to the patient/family/caregiver.  I have personally  taken the history and examined this patient and agree with the AMANDA's note as stated above.

## 2024-01-30 ENCOUNTER — CLINICAL SUPPORT (OUTPATIENT)
Dept: REHABILITATION | Facility: HOSPITAL | Age: 69
End: 2024-01-30
Attending: NURSE PRACTITIONER
Payer: MEDICARE

## 2024-01-30 DIAGNOSIS — M47.892 OTHER OSTEOARTHRITIS OF SPINE, CERVICAL REGION: ICD-10-CM

## 2024-01-30 DIAGNOSIS — M47.812 CERVICAL SPONDYLOSIS: ICD-10-CM

## 2024-01-30 DIAGNOSIS — M54.6 BILATERAL THORACIC BACK PAIN, UNSPECIFIED CHRONICITY: ICD-10-CM

## 2024-01-30 DIAGNOSIS — M54.14 THORACIC RADICULOPATHY: ICD-10-CM

## 2024-01-30 DIAGNOSIS — M79.18 MYOFASCIAL PAIN: ICD-10-CM

## 2024-01-30 PROCEDURE — 97161 PT EVAL LOW COMPLEX 20 MIN: CPT

## 2024-01-30 PROCEDURE — 97110 THERAPEUTIC EXERCISES: CPT

## 2024-02-05 ENCOUNTER — TELEPHONE (OUTPATIENT)
Dept: PAIN MEDICINE | Facility: CLINIC | Age: 69
End: 2024-02-05
Payer: MEDICARE

## 2024-02-05 DIAGNOSIS — M47.892 OTHER OSTEOARTHRITIS OF SPINE, CERVICAL REGION: Primary | ICD-10-CM

## 2024-02-06 ENCOUNTER — CLINICAL SUPPORT (OUTPATIENT)
Dept: REHABILITATION | Facility: HOSPITAL | Age: 69
End: 2024-02-06
Payer: MEDICARE

## 2024-02-06 DIAGNOSIS — R29.898 DECREASED ROM OF NECK: Primary | ICD-10-CM

## 2024-02-06 DIAGNOSIS — M53.84 DECREASED ROM OF THORACIC SPINE: ICD-10-CM

## 2024-02-06 PROCEDURE — 97110 THERAPEUTIC EXERCISES: CPT

## 2024-02-06 PROCEDURE — 97112 NEUROMUSCULAR REEDUCATION: CPT

## 2024-02-06 PROCEDURE — 97140 MANUAL THERAPY 1/> REGIONS: CPT

## 2024-02-06 NOTE — PROGRESS NOTES
[facility]   Physical Therapy Treatment Note      Name: Xochitl Johnston Zollinger Clinic Number: [id number]    Therapy Diagnosis:   Encounter Diagnoses   Name Primary?    Decreased ROM of neck Yes    Decreased ROM of thoracic spine      Physician: Maggy Weeks NP    Visit Date: 2/6/2024    Physician Orders: PT Eval and Treat   Medical Diagnosis from Referral: Other osteoarthritis of spine, cervical region [M47.892], Myofascial pain [M79.18], Bilateral thoracic back pain, unspecified chronicity [M54.6]   Evaluation Date: 1/30/2024  Authorization Period Expiration: 2/15/2024  Plan of Care Expiration: 4/30/2024  Progress Note Due: 2/30/2024  Date of Surgery: N/A  Visit # / Visits authorized: 1/1, 1/20  FOTO: 1/ 3     Precautions: Standard      Time In: 1:00 pm  Time Out: 1:55 pm  Total Billable Time: 55 minutes    PTA Visit #: 0/5     Subjective     Patient reports: improvements in thoracic motion with home exercise program and improvements in thoracic pain.  She was compliant with home exercise program.  Response to previous treatment: improvements in symptoms  Functional change: increased thoracic range of motion    Pain: 3/10  Location: bilateral neck and thoracic spine      Objective      Objective Measures updated at progress report unless specified.     Treatment     Xochitl received the treatments listed below:      therapeutic exercises to develop strength, endurance, ROM, flexibility, and posture for 15 minutes including:    Standing open books 15x both sides  At wall thoracic rotation/extension wall clock 15x both sides  Supine thoracic extension foam roller 20x    manual therapy techniques: Joint mobilizations, Manual traction, and Myofacial release were applied to the: neck/thoracic for 15 minutes, including:    Supine Bear Lower Thoracic Manipulation grade 5  Prone UPA right lower thoracic  8th right rib inspiratory resisted glide    neuromuscular re-education activities to  improve: Balance, Coordination, Kinesthetic, and Sense for 25 minutes. The following activities were included:    Wall slide with yellow thera band external rotation isometric 3x10  Seated Cane rotation with SB and inspiration 5 minutes  Prone lower trap level 0 strengthening 3x10    Patient Education and Home Exercises       Education provided:   - home exercise program progressions    Written Home Exercises Provided: yes. Exercises were reviewed and Xochitl was able to demonstrate them prior to the end of the session.  Xochitl demonstrated good  understanding of the education provided. See Electronic Medical Record under Patient Instructions for exercises provided during therapy sessions    Assessment     Patient presented for initial f/u with improvements observed in active thoracic rotation. Patient progressed still with thoracic joint and neck hypomobility with improvements following manual techniques. Patient educated on home exercise program progressions to continue to progress thoracic mobility to help patient return to prior exercise participation with pickle ball activities.    Xochitl Is progressing well towards her goals.   Patient prognosis is Good.     Patient will continue to benefit from skilled outpatient physical therapy to address the deficits listed in the problem list box on initial evaluation, provide pt/family education and to maximize pt's level of independence in the home and community environment.     Patient's spiritual, cultural and educational needs considered and pt agreeable to plan of care and goals.     Anticipated barriers to physical therapy: none    GOALS: Short Term Goals: 4 weeks  1.Report decreased neck/thoracic pain  <   / =  3  /10  to increase tolerance for athletic pickle ball activities and gym participation  2. Increase cervical ROM by 5-10 degrees in order to perform ADLs with decreased difficulty.  3. Increase strength in B shoulders/scapular stabilizers by 1/3 MMT  grade to increase tolerance for ADL and work activities.  4. Pt to tolerate HEP to improve ROM and independence with ADL's     Long Term Goals: 8 weeks  1.Report decreased neck/thoracic pain  <   / =  1  /10  to increase tolerance for athletic activities and gym participation  2. Increase UE/neck flexibility in order to improve posture.    3.Increased strength in B shoulders/scapular stabilizers to >/= 4/5 MMT grade to increase tolerance for ADL and work activities.  4.  Pt will report at  level (20-30% impaired) on FOTO neck score for neck pain disability to demonstrate decrease in disability and improvement in neck pain.     Plan     Continue per initial plan of care    Pa Arizmendi, PT

## 2024-02-08 ENCOUNTER — PROCEDURE VISIT (OUTPATIENT)
Dept: PAIN MEDICINE | Facility: CLINIC | Age: 69
End: 2024-02-08
Attending: ANESTHESIOLOGY
Payer: MEDICARE

## 2024-02-08 ENCOUNTER — TELEPHONE (OUTPATIENT)
Dept: PAIN MEDICINE | Facility: CLINIC | Age: 69
End: 2024-02-08

## 2024-02-08 VITALS
WEIGHT: 149.69 LBS | TEMPERATURE: 98 F | HEART RATE: 68 BPM | BODY MASS INDEX: 25.56 KG/M2 | SYSTOLIC BLOOD PRESSURE: 106 MMHG | HEIGHT: 64 IN | DIASTOLIC BLOOD PRESSURE: 68 MMHG

## 2024-02-08 DIAGNOSIS — M47.892 OTHER OSTEOARTHRITIS OF SPINE, CERVICAL REGION: ICD-10-CM

## 2024-02-08 DIAGNOSIS — M50.30 DDD (DEGENERATIVE DISC DISEASE), CERVICAL: ICD-10-CM

## 2024-02-08 DIAGNOSIS — M79.18 MYOFASCIAL PAIN: Primary | ICD-10-CM

## 2024-02-08 DIAGNOSIS — M54.6 BILATERAL THORACIC BACK PAIN, UNSPECIFIED CHRONICITY: ICD-10-CM

## 2024-02-08 PROCEDURE — 20553 NJX 1/MLT TRIGGER POINTS 3/>: CPT | Mod: GC,S$GLB,, | Performed by: ANESTHESIOLOGY

## 2024-02-08 RX ORDER — TIZANIDINE 2 MG/1
TABLET ORAL
Qty: 90 TABLET | Refills: 2 | Status: SHIPPED | OUTPATIENT
Start: 2024-02-08

## 2024-02-08 RX ORDER — TRIAMCINOLONE ACETONIDE 40 MG/ML
40 INJECTION, SUSPENSION INTRA-ARTICULAR; INTRAMUSCULAR
Status: COMPLETED | OUTPATIENT
Start: 2024-02-08 | End: 2024-02-08

## 2024-02-08 RX ADMIN — TRIAMCINOLONE ACETONIDE 40 MG: 40 INJECTION, SUSPENSION INTRA-ARTICULAR; INTRAMUSCULAR at 03:02

## 2024-02-08 NOTE — PROCEDURES
Patient Name: Rebecca Johnston Zollinger  MRN: [id number]    INFORMED CONSENT: The procedure, risks, benefits and options were discussed with patient. There are no contraindications to the procedure. The patient expressed understanding and agreed to proceed. The personnel performing the procedure was discussed. I verify that I personally obtained Xochitl's consent prior to the start of the procedure and the signed consent can be found on the patient's chart.    Procedure Date: 02/08/2024    Anesthesia: Topical    Pre Procedure diagnosis:   1. Myofascial pain    2. Other osteoarthritis of spine, cervical region    3. Bilateral thoracic back pain, unspecified chronicity    4. DDD (degenerative disc disease), cervical        Post-Procedure diagnosis: same      Sedation: None    PROCEDURE: TRIGGER POINT INJECTION  The patient was placed in a seated position. The site of pain and procedure were confirmed with the patient prior to starting the procedure. After performing time out. The patient's  trigger points were identified and marked. The skin was prepped with chlorhexidine three times.   After performing time out A 27-gauge 1.5 inch  needle was advanced through the skin and subcutaneous tissues.  Aspiration for blood, air and CSF was negative.  A total of 5 ml of Bupivacaine 0.25% and 20 mg Kenalog  was injected at all trigger point.  No complications were evident. No specimens collected.    Blood Loss: Nill  Specimen: None    Jose G López MD  I have personally taken the history and examined this patient and agree with the resident's note as stated above.

## 2024-02-08 NOTE — PROCEDURES
Patient Name: Rebecca Johnston Zollinger  MRN: [id number]    INFORMED CONSENT: The procedure, risks, benefits and options were discussed with patient. There are no contraindications to the procedure. The patient expressed understanding and agreed to proceed. The personnel performing the procedure was discussed. I verify that I personally obtained Xochitl's consent prior to the start of the procedure and the signed consent can be found on the patient's chart.    Procedure Date: 02/08/2024    Anesthesia: Topical    Pre Procedure diagnosis:   1. Myofascial pain    2. Other osteoarthritis of spine, cervical region    3. Bilateral thoracic back pain, unspecified chronicity    4. DDD (degenerative disc disease), cervical        Post-Procedure diagnosis: same      Sedation: None    PROCEDURE: TRIGGER POINT INJECTION  The patient was placed in a seated position. The site of pain and procedure were confirmed with the patient prior to starting the procedure. After performing time out. The patient's  trigger points were identified and marked. The skin was prepped with chlorhexidine three times.   After performing time out A 27-gauge 1.5 inch  needle was advanced through the skin and subcutaneous tissues.  Aspiration for blood, air and CSF was negative.  A total of 5 ml of Bupivacaine 0.25% and 20 mg Kenalog  was injected at all trigger point.  No complications were evident. No specimens collected.    Blood Loss: Nill  Specimen: None    Jose G López MD  I have personally taken the history and examined this patient and agree with the resident's note as stated above.

## 2024-02-09 NOTE — TELEPHONE ENCOUNTER
Pt has been seen today.    ----- Message from Vianney Oliver sent at 2/8/2024  1:41 PM CST -----  Regarding: call back  Type: Patient Call Back    Who called: pt    What is the request in detail: states she's running late after being stopped by a train.     Can the clinic reply by [facility]?no    Would the patient rather a call back or a response via [facility]? call    Best call back number: [phone]     Additional Information:

## 2024-02-09 NOTE — TELEPHONE ENCOUNTER
----- Message from Vianney Oliver sent at 2/8/2024  1:41 PM CST -----  Regarding: call back  Type: Patient Call Back    Who called: pt    What is the request in detail: states she's running late after being stopped by a train.     Can the clinic reply by [facility]?no    Would the patient rather a call back or a response via [facility]? call    Best call back number: [phone]     Additional Information:

## 2024-02-15 NOTE — PLAN OF CARE
[facility]   Physical Therapy Initial Evaluation      Name: Xochitl [place] [facility] Number: [id number]    Therapy Diagnosis:   Encounter Diagnoses   Name Primary?    Thoracic radiculopathy     Cervical spondylosis     Myofascial pain     Other osteoarthritis of spine, cervical region     Bilateral thoracic back pain, unspecified chronicity         Physician: Maggy Weeks, NP    Physician Orders: PT Eval and Treat   Medical Diagnosis from Referral: Other osteoarthritis of spine, cervical region [M47.892], Myofascial pain [M79.18], Bilateral thoracic back pain, unspecified chronicity [M54.6]   Evaluation Date: 1/30/2024  Authorization Period Expiration: 2/15/2024  Plan of Care Expiration: 4/30/2024  Progress Note Due: 2/30/2024  Date of Surgery: N/A  Visit # / Visits authorized: 1/1   FOTO: 1/ 3    Precautions: Standard     Time In: 1:00 pm  Time Out: 1:55 pm  Total Billable Time: 55 minutes    Subjective     Date of onset: 1 month ago    History of current condition - Xochilt reports left side neck pain that began 1 month ago. She reports pulling symptoms with tightness and a cram that will lead to a headache. She received an epidural injection in thoracic spine a month ago with onset of symptoms following. She reports her neck limits her the most currently. She reports her thoracic spine pain on her R side is accompanied with deep breathing and rotation activities like pickle ball. She currently is active with pickle ball 2x a week and walking 2-3x a week.    Per MD Office Visit 12/15/2023:  The patient returns to clinic today for follow up of back pain. She reports increased neck pain over the last month. She reports neck pain and pain at the base of her head, left side greater than right. This pain is worse with bending forward. She has tried Excedrin and Flexeril with limited relief. She denies any radicular arm pain. She continues to report right sided mid and low back  pain. She does have radiating pain into the right groin and anterior thigh. She is scheduled for [facility] next week. She continues to perform a home exercise routine. She denies any other health changes. Her pain today is 8/10.      Falls: none    Imaging:   EXAMINATION:  XR CERVICAL SPINE 5 VIEW WITH FLEX AND EXT     CLINICAL HISTORY:  Spondylosis without myelopathy or radiculopathy, cervical region     TECHNIQUE:  Five views of the cervical spine plus flexion and extension views were performed.     COMPARISON:  None.     FINDINGS:  Vertebral body heights are maintained.     Disc space narrowing and endplate changes C5-6 and C6-7.  Small posterior osteophytes at these levels.     Oblique views show bony narrowing of the neural foramina C5-6 on the right and C4-5 and C5-6 on the left.     Reversal of the normal cervical lordosis in the lower cervical spine.  Otherwise, AP alignment appears anatomic.     No significant prevertebral soft tissue edema.    Prior Therapy: none  Social History:  lives with their family  Occupation: retired  Prior Level of Function: indp  Current Level of Function: indp    Pain:  Current 3/10, worst 6/10, best 0/10   Location: left neck    Description: Aching, Dull, and tight  Aggravating Factors: head rotations  Easing Factors: pain medication and rest    Patients goals: to reduce symptoms and get back to playing pickle ball without limitations     Medical History:   No past medical history on file.    Surgical History:   Rebecca Johnston Zollinger  has a past surgical history that includes Transforaminal epidural injection of steroid (Right, 8/14/2023) and Transforaminal epidural injection of steroid (Right, 12/18/2023).    Medications:   Xochitl has a current medication list which includes the following prescription(s): alendronate, aspirin, estradiol, fluoxetine, hydrocodone-apap 7.5-325 mg/15 ml, ipratropium, irbesartan, and tizanidine.    Allergies:   Review of patient's allergies  indicates:  No Known Allergies     Objective      Posture: left shoulder lower than right    Cervical Range of Motion:    Degrees Pain/Dysfunction With Muscular Offload   Flexion 65 -      Extension 50 -      Right Rotation 70 -   -   Left Rotation 43 Stiffness of left   + for pain   Right Side Bending 25 - -   Left Side Bending 25 - -     Thoracic Rotation:  Left 25% limited vs right    Upper Cervical Passive Range of Motion    Limitations (%) Pain/dysfunction   C1-2 Right Rotation 25% Stretch of left     Shoulder Range of Motion: active range of motion (passive range of motion)  WFL    Upper Extremity Strength  (R) UE  (L) UE    Shoulder flexion: 5/5 Shoulder flexion: 5/5   Shoulder Abduction: 5/5 Shoulder abduction: 5/5   Shoulder ER 4/5 Shoulder ER 4/5   Shoulder IR 4+/5 Shoulder IR 4+/5   Elbow flexion: 5/5 Elbow flexion: 5/5   Elbow extension: 5/5 Elbow extension: 5/5   Wrist flexion: 5/5 Wrist flexion: 5/5   Wrist extension: 5/5 Wrist extension: 5/5    5/5 : 5/5   Lower Trap 3+/5 Lower Trap 3+/5   Middle Trap 4-/5 Middle Trap 4-/5       Special Tests:  Distraction -   Spurling's -   Deep Neck Flexor Endurance Test Poor 5 sec     Cervical joint mobility:     - Cervical Thoracic Junction Mobility: hypomobile  - Lower Cervical Side Glide: hypomobile L to R  - hypomobile T10-T12 PA spinous precess  - hypomobile PA of right T10-T12 transverse  - hypomobile PA of left lower cervical     Thoracic mobility: 25% reduced left thoracic rotation    Palpation: tender to palpation of left neck paraspinals      Sensation: intact    Intake Outcome Measure for FOTO Neck Survey    Therapist reviewed FOTO scores for Rebecca Johnston Zollinger on 1/30/2024.   FOTO report - see Media section or FOTO account episode details.    Intake Score: 49%         Treatment     Total Treatment time (time-based codes) separate from Evaluation: 15 minutes     Xochitl received the treatments listed below:      therapeutic exercises to  develop strength, endurance, ROM, flexibility, posture, and core stabilization for 10 minutes including:    Education/instruction on home exercise program    manual therapy techniques: Joint mobilizations and Manual traction were applied to the: thoracic spine for 5 minutes, including:    Prone UPA thoracic spine T8-T10  Inspiratory rib glide R side     Patient Education and Home Exercises     Education provided:   - Role of Physical Therapy, plan of care, home exercise program, prognosis, physical therapy diagnosis  - thoracic/neck anatomy with deficits present and contribution to symptoms     Written Home Exercises Provided: yes. Exercises were reviewed and Xochitl was able to demonstrate them prior to the end of the session.  Xochitl demonstrated good  understanding of the education provided. See EMR under Patient Instructions for exercises provided during therapy sessions.    Assessment     Xochitl is a 68 y.o. female referred to outpatient Physical Therapy with a medical diagnosis of Other osteoarthritis of spine, cervical region [M47.892], Myofascial pain [M79.18], Bilateral thoracic back pain, unspecified chronicity [M54.6]. Patient presents with deficits in neck and thoracic range of motion with greatest limitations observed in left neck and thoracic rotation. Patient with associated hypomobility of thoracic segments and lower cervical segments. Patient rotation deficits limiting participation in athletic activities and exercise participation impairing quality of life.    Patient prognosis is Good.   Patient will benefit from skilled outpatient Physical Therapy to address the deficits stated above and in the chart below, provide patient /family education, and to maximize patientt's level of independence.     Plan of care discussed with patient: Yes  Patient's spiritual, cultural and educational needs considered and patient is agreeable to the plan of care and goals as stated below:     Anticipated Barriers  for therapy: none    Medical Necessity is demonstrated by the following  History  Co-morbidities and personal factors that may impact the plan of care [x] LOW: no personal factors / co-morbidities  [] MODERATE: 1-2 personal factors / co-morbidities  [] HIGH: 3+ personal factors / co-morbidities    Moderate / High Support Documentation:   Co-morbidities affecting plan of care:     Personal Factors:   age     Examination  Body Structures and Functions, activity limitations and participation restrictions that may impact the plan of care [x] LOW: addressing 1-2 elements  [] MODERATE: 3+ elements  [] HIGH: 4+ elements (please support below)    Moderate / High Support Documentation:      Clinical Presentation [x] LOW: stable  [] MODERATE: Evolving  [] HIGH: Unstable     Decision Making/ Complexity Score: low         GOALS: Short Term Goals: 4 weeks  1.Report decreased neck/thoracic pain  <   / =  3  /10  to increase tolerance for athletic pickle ball activities and gym participation  2. Increase cervical ROM by 5-10 degrees in order to perform ADLs with decreased difficulty.  3. Increase strength in B shoulders/scapular stabilizers by 1/3 MMT grade to increase tolerance for ADL and work activities.  4. Pt to tolerate HEP to improve ROM and independence with ADL's    Long Term Goals: 8 weeks  1.Report decreased neck/thoracic pain  <   / =  1  /10  to increase tolerance for athletic activities and gym participation  2. Increase UE/neck flexibility in order to improve posture.    3.Increased strength in B shoulders/scapular stabilizers to >/= 4/5 MMT grade to increase tolerance for ADL and work activities.  4.  Pt will report at  level (20-30% impaired) on FOTO neck score for neck pain disability to demonstrate decrease in disability and improvement in neck pain.     Plan     Plan of care Certification: 1/30/2024 to 5/30/2024.    Outpatient Physical Therapy 1 times weekly for 12 weeks to include the following interventions:  Gait Training, Manual Therapy, Moist Heat/ Ice, Neuromuscular Re-ed, Patient Education, Therapeutic Activities, and Therapeutic Exercise.     Pa Arizmendi, PT        Physician's Signature: _________________________________________ Date: ________________

## 2024-02-20 ENCOUNTER — CLINICAL SUPPORT (OUTPATIENT)
Dept: REHABILITATION | Facility: HOSPITAL | Age: 69
End: 2024-02-20
Payer: MEDICARE

## 2024-02-20 DIAGNOSIS — M53.84 DECREASED ROM OF THORACIC SPINE: Primary | ICD-10-CM

## 2024-02-20 DIAGNOSIS — R29.898 DECREASED ROM OF NECK: ICD-10-CM

## 2024-02-20 PROCEDURE — 97112 NEUROMUSCULAR REEDUCATION: CPT

## 2024-02-20 PROCEDURE — 97140 MANUAL THERAPY 1/> REGIONS: CPT

## 2024-02-23 PROBLEM — M53.84 DECREASED ROM OF THORACIC SPINE: Status: ACTIVE | Noted: 2024-02-23

## 2024-02-23 PROBLEM — R29.898 DECREASED ROM OF NECK: Status: ACTIVE | Noted: 2024-02-23

## 2024-02-27 ENCOUNTER — CLINICAL SUPPORT (OUTPATIENT)
Dept: REHABILITATION | Facility: HOSPITAL | Age: 69
End: 2024-02-27
Payer: MEDICARE

## 2024-02-27 DIAGNOSIS — M53.84 DECREASED ROM OF THORACIC SPINE: ICD-10-CM

## 2024-02-27 DIAGNOSIS — R29.898 DECREASED ROM OF NECK: Primary | ICD-10-CM

## 2024-02-27 PROCEDURE — 97112 NEUROMUSCULAR REEDUCATION: CPT

## 2024-02-27 NOTE — PROGRESS NOTES
[facility]   Physical Therapy Treatment Note      Name: Xochitl [place] [facility] Number: [id number]    Therapy Diagnosis:   Encounter Diagnoses   Name Primary?    Decreased ROM of neck Yes    Decreased ROM of thoracic spine      Physician: Maggy Weeks NP    Visit Date: 2024    Physician Orders: PT Eval and Treat   Medical Diagnosis from Referral: Other osteoarthritis of spine, cervical region [M47.892], Myofascial pain [M79.18], Bilateral thoracic back pain, unspecified chronicity [M54.6]   Evaluation Date: 2024  Authorization Period Expiration: 2/15/2024  Plan of Care Expiration: 2024  Progress Note Due:   Date of Surgery: N/A  Visit # / Visits authorized: , 3/20  FOTO: 1/ 3     Precautions: Standard      Time In: 11:00 pm  Time Out: 11:50 pm  Total Billable Time: 50 minutes    PTA Visit #: 0/5     Subjective     Patient reports: that she is doing well and didn't start observing symptoms till pickle ball by the 5th set.    She was compliant with home exercise program.  Response to previous treatment: increased thoracic mobility  Functional change: increased exercise participation without symptoms    Pain: 1/10  Location: thoracic spine and neck     Objective      Objective Measures updated at progress report unless specified.     Treatment     Xochitl received the treatments listed below:      therapeutic exercises to develop strength, endurance, ROM, flexibility, and posture for 15 minutes including:    Standing open books green thera band 2x10 both sides  At wall thoracic rotation/extension wall clock 15x both sides +2lb ankle weight around wrist    NT:  Supine thoracic extension foam roller 20x    manual therapy techniques: Joint mobilizations, Manual traction, and Myofacial release were applied to the: neck/thoracic for 15 minutes, includinth right rib inspiratory resisted glide  Prone CTJ mobilization    NT:  Upper Cervical flexion  MET  Upper Cervical Rotation MET   Supine Bear Lower Thoracic Manipulation grade 5  Prone UPA right lower thoracic    neuromuscular re-education activities to improve: Balance, Coordination, Kinesthetic, and Sense for 25 minutes. The following activities were included:    90/90 walkouts external rotation and internal rotation 2x10 both sides  Kneeling Cable row 2x10 both sides 10lbs   Kneeling side toss into wall green ball 2x10 both sides  PNF D2 extension red thera band 2x10    NT:  External rotation isometric yellow thera band scaption 2x8  Seated Cane rotation with SB and inspiration 5 minutes  BP Cuff 20 to 24 mmHg 10x 10 sec hold, 30 to 34 mmHg 10x 10 sec hold    Patient Education and Home Exercises       Education provided:   - home exercise program    Written Home Exercises Provided: yes. Exercises were reviewed and Xochitl was able to demonstrate them prior to the end of the session.  Xochitl demonstrated good  understanding of the education provided. See Electronic Medical Record under Patient Instructions for exercises provided during therapy sessions    Assessment     Patient progressing well with Physical Therapy and demonstrating greater tolerance to exercise participation and continued maintence of normal thoracic active range of motion. Patient progressed today with scapular control and NM control during thoracic rotation along with plymometric activities mimicking pickle ball movements.    Xochitl Is progressing well towards her goals.   Patient prognosis is Good.     Patient will continue to benefit from skilled outpatient physical therapy to address the deficits listed in the problem list box on initial evaluation, provide pt/family education and to maximize pt's level of independence in the home and community environment.     Patient's spiritual, cultural and educational needs considered and pt agreeable to plan of care and goals.     Anticipated barriers to physical therapy: none    GOALS: Short  Term Goals: 4 weeks  1.Report decreased neck/thoracic pain  <   / =  3  /10  to increase tolerance for athletic pickle ball activities and gym participation  2. Increase cervical ROM by 5-10 degrees in order to perform ADLs with decreased difficulty.  3. Increase strength in B shoulders/scapular stabilizers by 1/3 MMT grade to increase tolerance for ADL and work activities.  4. Pt to tolerate HEP to improve ROM and independence with ADL's     Long Term Goals: 8 weeks  1.Report decreased neck/thoracic pain  <   / =  1  /10  to increase tolerance for athletic activities and gym participation  2. Increase UE/neck flexibility in order to improve posture.    3.Increased strength in B shoulders/scapular stabilizers to >/= 4/5 MMT grade to increase tolerance for ADL and work activities.  4.  Pt will report at  level (20-30% impaired) on FOTO neck score for neck pain disability to demonstrate decrease in disability and improvement in neck pain.     Plan     Continue per initial plan of care    Pa Arizmendi, PT

## 2024-03-05 ENCOUNTER — CLINICAL SUPPORT (OUTPATIENT)
Dept: REHABILITATION | Facility: HOSPITAL | Age: 69
End: 2024-03-05
Payer: MEDICARE

## 2024-03-05 ENCOUNTER — OFFICE VISIT (OUTPATIENT)
Dept: PAIN MEDICINE | Facility: CLINIC | Age: 69
End: 2024-03-05
Attending: ANESTHESIOLOGY
Payer: MEDICARE

## 2024-03-05 DIAGNOSIS — M50.30 DDD (DEGENERATIVE DISC DISEASE), CERVICAL: ICD-10-CM

## 2024-03-05 DIAGNOSIS — M54.16 LUMBAR RADICULOPATHY: ICD-10-CM

## 2024-03-05 DIAGNOSIS — M47.816 LUMBAR SPONDYLOSIS: Primary | ICD-10-CM

## 2024-03-05 DIAGNOSIS — M47.26 OSTEOARTHRITIS OF SPINE WITH RADICULOPATHY, LUMBAR REGION: ICD-10-CM

## 2024-03-05 DIAGNOSIS — R29.898 DECREASED ROM OF NECK: Primary | ICD-10-CM

## 2024-03-05 DIAGNOSIS — M54.6 BILATERAL THORACIC BACK PAIN, UNSPECIFIED CHRONICITY: ICD-10-CM

## 2024-03-05 DIAGNOSIS — M53.84 DECREASED ROM OF THORACIC SPINE: ICD-10-CM

## 2024-03-05 DIAGNOSIS — M79.18 MYOFASCIAL PAIN: ICD-10-CM

## 2024-03-05 PROCEDURE — 97110 THERAPEUTIC EXERCISES: CPT

## 2024-03-05 PROCEDURE — 99213 OFFICE O/P EST LOW 20 MIN: CPT | Mod: 95,,, | Performed by: NURSE PRACTITIONER

## 2024-03-05 NOTE — PROGRESS NOTES
"Chronic Pain-Tele-Medicine-Established Note (Follow up visit)        The patient location is: Home  The chief complaint leading to consultation is: pain  Visit type: Virtual visit with synchronous audio and video  Total time spent with patient: 15 min  Each patient to whom he or she provides medical services by telemedicine is:  (1) informed of the relationship between the physician and patient and the respective role of any other health care provider with respect to management of the patient; and (2) notified that he or she may decline to receive medical services by telemedicine and may withdraw from such care at any time.          SUBJECTIVE:    Interval History 3/5/2024:  The patient has a virtual follow up for neck and back pain. She is s/p TPIs for neck pain which she says helped 100% for that pain. Her primary complaint today is middle back pain. She had thoracic SANJUANA in the past but feels like this is slightly higher. However, she says that it is tolerable at this time. She completed PT today and says that she plans to keep up with her home exercises. Her overall pain today is 5/10.    Interval History 1/10/2024:  Rebecca Johnston Zollinger returns to clinic today s/p Right T12/L1 and L1/L2 TA SANJUANA on 12/18/2023.  She reports 85% relief that is ongoing.  She is back to walking 2 miles again.  She does find that when she exerts herself to her full extent that the pain returns slightly.  Today she continues to complain of neck pain from her previous visit and of new mid back pain.  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  She states that the pain is located bilaterally at the base of her skull and into her neck, left greater than right.  She describes the pain as pressure and it feels like "a clamp". Sometimes she has a headache by the end of the day.  The mid back pain is occasionally present in the AM and is relieve " by stretching.  It is exacerbated by aerobic activity and pickleball.  The pain will wrap around right side of ribs and under her breast. .  She has not started  PT yet-she was supposed to start yesterday, but the appointment was cancelled due to weather. Denies bowel and bladder dysfunction, fever, chills, nightsweats or weight changes. She continues Robaxin Pain today is 5/10.    She denies any new weakness, denies fevers, numbness, saddle anesthesia or incontinence.     Interval History 12/15/2023:  The patient returns to clinic today for follow up of back pain. She reports increased neck pain over the last month. She reports neck pain and pain at the base of her head, left side greater than right. This pain is worse with bending forward. She has tried Excedrin and Flexeril with limited relief. She denies any radicular arm pain. She continues to report right sided mid and low back pain. She does have radiating pain into the right groin and anterior thigh. She is scheduled for SANJUANA next week. She continues to perform a home exercise routine. She denies any other health changes. Her pain today is 8/10.     Interval History 11/21/2023:  The patient returns to clinic today for follow up of back pain via virtual visit. She is here today for imaging review. She reports worsened right sided mid and low back pain. This radiates into the right groin and anterior thigh. She denies any left leg pain. Her pain is worse with prolonged walking. She is performing a home exercise routine. She denies any other health changes.     Interval History 8/28/2023:  Xochitl Montenegro Zollinger presents to the clinic for a follow-up appointment for back pain via virtual visit. She is s/p right T12/L1 TF SANJUANA on 8/14/2023. She reports 100% relief of her mid back pain. She continues to report low back pain that radiates into her groin bilaterally, right greater than left. This is worse with prolonged walking. She is no longer able to walk 4  miles, which is her typical exercise routine. She has completed multiple rounds of PT. She denies any other health changes.       HPI:  67 year old female who presents with Right sided lower back pain. 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 The pain is located in her R back and goes to the R hip and groin when she walks. At worst it is a 10/10, baseline is a 3/10. She describes it as aching. She used to be able to walk 4 miles, now she states she can only walk 1 mile without pain. Last saw pain management at  2 years ago, no interventions performed. She has tried multiple rounds of PT, most recently in 2022 with some improvement, completed over 6 weeks of PT. She takes occasional ibuprofen and tylenol. Used to take tramadol and opioids but is no longer taking. She has had an MRI back in 2021 showing protruding disc at T12-L1. She denies any new weakness, denies fevers, numbness, saddle anesthesia or incontinence.     Pain Disability Index Review:      1/10/2024     7:32 AM 12/15/2023     1:43 PM 7/5/2023     8:27 AM   Last 3 PDI Scores   Pain Disability Index (PDI) 6 24 52       Pain Medications:  None    Opioid Contract: not applicable     report:  Not applicable    Pain Procedures:   8/14/2023- Right T12/L1 TF SANJUANA    Physical Therapy/Home Exercise: yes    Imaging:    XR CERVICAL SPINE 5 VIEW WITH FLEX AND EXT-12/15/2023     CLINICAL HISTORY:  Spondylosis without myelopathy or radiculopathy, cervical region     TECHNIQUE:  Five views of the cervical spine plus flexion and extension views were performed.     COMPARISON:  None.     FINDINGS:  Vertebral body heights are maintained.     Disc space narrowing and endplate changes C5-6 and C6-7.  Small posterior osteophytes at these levels.     Oblique views show bony narrowing of the neural foramina C5-6 on the right and C4-5 and C5-6 on the left.     Reversal of the normal  cervical lordosis in the lower cervical spine.  Otherwise, AP alignment appears anatomic.     No significant prevertebral soft tissue edema.     Impression:     Degenerative change as above.       MRI Lumbar Spine 9/22/2023:  COMPARISON:  None.     FINDINGS:  Alignment: Grade 1 retrolisthesis of L5-S1.  Straightening of lumbar lordosis.     Vertebrae: Multilevel degenerative endplate changes no fracture or marrow infiltrative process.     Discs: Moderate to severe disc height loss noted throughout the lumbar spine.  No evidence for discitis.     Cord: Conus terminates at L1-L2 and appears unremarkable.  Cauda equina appears unremarkable.     Degenerative findings:     T11-T12: Right paracentral disc extrusion results in moderate effacement of the right lateral recess and mass effect upon the right T12 nerve root.     T12-L1: Circumferential disc bulge with right paracentral disc extrusion result in mild effacement of the right lateral recess.     L1-L2: Circumferential disc bulge and mild facet arthropathy result in mild bilateral neural foraminal narrowing.     L2-L3: Circumferential disc bulge and mild facet arthropathy result in mild left neural foraminal narrowing.     L3-L4: Circumferential disc bulge and moderate facet arthropathy result in mild spinal canal stenosis and mild bilateral neural foraminal narrowing.     L4-L5: Circumferential disc bulge and mild facet arthropathy result in mild effacement of the lateral recesses and mild bilateral neural foraminal narrowing peer     L5-S1: Circumferential disc bulge and mild facet arthropathy result in moderate right, mild left neural foraminal narrowing.     Paraspinal muscles & soft tissues: Moderate paraspinal muscle atrophy.  4.0 cm left renal cyst.     Impression:     1. Multilevel degenerative changes of the lumbar and lower thoracic spine as detailed above.     Xray Hip 7/5/2023:  FINDINGS:  No acute fractures.  Some degenerative changes visualized lower  lumbar spine to include endplate osteophytes and lumbosacral disc narrowing and possible vacuum phenomenon.  Intact right and left SI joints.  No definite narrowing of right or left hip joint spaces or acetabular spurring.  Preserved right and left femoral head contours.     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      aspirin (ECOTRIN) 81 MG EC tablet Take 81 mg by mouth once daily.      estradioL (ESTRACE) 0.01 % (0.1 mg/gram) vaginal cream Place vaginally once daily.      FLUoxetine 40 MG capsule Take 40 mg by mouth once daily.      hydrocodone-acetaminophen (HYCET) solution 7.5-325 mg/15mL Take by mouth 4 (four) times daily as needed for Pain.      ipratropium (ATROVENT) 42 mcg (0.06 %) nasal spray 2 sprays by Each Nostril route 4 (four) times daily.      irbesartan (AVAPRO) 150 MG tablet Take 150 mg by mouth every evening.      tiZANidine (ZANAFLEX) 2 MG tablet 1 at bedtime for 3 nights then 2 every night for 3 nights then 3 every night to follow. Stay at the most comfortable dose. 90 tablet 2     No current facility-administered medications for this visit.       REVIEW OF SYSTEMS:    GENERAL:  No weight loss, malaise or fevers.  HEENT:  Negative for frequent or significant headaches.  NECK:  Negative for lumps, goiter, pain and significant neck swelling.  RESPIRATORY:  Negative for cough, wheezing or shortness of breath.  CARDIOVASCULAR:  Negative for chest pain, leg swelling or palpitations.  GI:  Negative for abdominal discomfort, blood in stools or black stools or change in bowel habits.  MUSCULOSKELETAL:   See HPI.  SKIN:  Negative for lesions, rash, and itching.  PSYCH:  Negative for sleep disturbance, mood disorder and recent psychosocial stressors.  HEMATOLOGY/LYMPHOLOGY:  Negative for prolonged bleeding, bruising easily or swollen nodes.  NEURO:   No history of headaches, syncope, paralysis, seizures or tremors.  All other reviewed and negative other than HPI.    Past Medical History:  History reviewed. No pertinent past medical history.    Past Surgical History:  Past Surgical History:   Procedure Laterality Date    TRANSFORAMINAL EPIDURAL INJECTION OF STEROID Right 8/14/2023    Procedure: INJECTION, STEROID, EPIDURAL, TRANSFORAMINAL APPROACH, RIGHT T12/L1 SOONER DATE;  Surgeon: Isi Womack MD;  Location: [facility];  Service: Pain Management;  Laterality: Right;    TRANSFORAMINAL EPIDURAL INJECTION OF STEROID Right 12/18/2023    Procedure: LUMBAR TRANSFORAMINAL RIGHT T12/L1 AND L1/2;  Surgeon: Isi Womack MD;  Location: [facility];  Service: Pain Management;  Laterality: Right;  [phone]  2 WK F/U DEEPALI       Family History:  History reviewed. No pertinent family history.    Social History:  Social History     Socioeconomic History    Marital status:    Tobacco Use    Smoking status: Former     Types: Cigarettes     Passive exposure: Past    Smokeless tobacco: Never    Tobacco comments:     Quit 20 years ago   Substance and Sexual Activity    Alcohol use: Not Currently    Drug use: Never    Sexual activity: Not Currently       OBJECTIVE:      PHYSICAL EXAMINATION:    General appearance: Well appearing, in no acute distress, alert and oriented x3.  Psych:  Mood and affect appropriate.  Skin: Skin color normal, no rashes or lesions, in both upper and lower body.  Extremities: Moves all visualized extremities freely.      PHYSICAL EXAMINATION:    General appearance: Well appearing, in no acute distress, alert and oriented x3.  Psych:  Mood and affect appropriate.  Skin: Skin color, texture,  turgor normal, no rashes or lesions, in both upper and lower body.  Head/face:  Atraumatic, normocephalic.    Neck: There is pain with palpation over left splenius capitus and upper trapezius. There is pain with palpation over the cervicothroacic interspinus ligament. Spurling Negative bilaterally. Full ROM to the right, limited ROM to the left,  pain on flexion and extension.   Cor: Capillary refill <3 seconds.  Pulm: Symmetric chest rise, no respiratory distress noted.   Back: Straight leg raising in the sitting and supine positions is negative to radicular pain. There is no pain with palpation over thoracic and lumbar facet joints.  Normal ROM with pain reproduction to the right mid thoracic paraspinals.  Extremities: No deformities, edema, or skin discoloration.   Musculoskeletal: FABERs is negative bilaterally.  Bilateral lower extremity strength is normal and symmetric.  No atrophy or tone abnormalities are noted.  Neuro:  No loss of sensation is noted.  Bilateral upper and lower extremity reflexes tested and are equal and symmetric. Plantar reflexes downgoing.  No clonus noted.  Gait: Normal.    ASSESSMENT: 68 y.o. year old female with back and neck pain, consistent with the followin. Lumbar spondylosis        2. Lumbar radiculopathy        3. DDD (degenerative disc disease), cervical        4. Myofascial pain        5. Bilateral thoracic back pain, unspecified chronicity        6. Osteoarthritis of spine with radiculopathy, lumbar region            PLAN:     We discussed with the patient the assessment and recommendations. The following is the plan we agreed on:     - Previous imaging was reviewed with independent interpretation.  It was reviewed and discussed with the patient today.    - She had benefit with PT and will continue home exercises.     - She had benefit with recent TPIs for neck pain. She is still having some middle back pain and will consider SANJUANA if needed- she feels like it may be  higher than previous area.    - I have stressed the importance of physical activity and a home exercise plan to help with pain and improve health.    - Continue Robaxin 500 mg TID PRN muscle pain.     - If limited relief, consider left splenius capitus and thoracic paraspinus and interspinus ligament injection/triggerpoint injections of 4 mg dexamethasone.    - RTC as needed. She would like to call.     The above plan and management options were discussed at length with patient. Patient is in agreement with the above and verbalized understanding.    Rachael Davenport  03/05/2024

## 2024-03-05 NOTE — PROGRESS NOTES
[facility]   Physical Therapy Treatment Note      Name: Xochitl Montenegro Zollinger Clinic Number: [id number]    Therapy Diagnosis:   Encounter Diagnoses   Name Primary?    Decreased ROM of neck Yes    Decreased ROM of thoracic spine      Physician: Maggy Weeks NP    Visit Date: 3/5/2024    Physician Orders: PT Eval and Treat   Medical Diagnosis from Referral: Other osteoarthritis of spine, cervical region [M47.892], Myofascial pain [M79.18], Bilateral thoracic back pain, unspecified chronicity [M54.6]   Evaluation Date: 1/30/2024  Authorization Period Expiration: 2/15/2024  Plan of Care Expiration: 4/30/2024  Progress Note Due: 2/30/2024  Date of Surgery: N/A  Visit # / Visits authorized: 1/1, 4/20  FOTO: 2/ 3     Precautions: Standard      Time In: 11:00 pm  Time Out: 11:15 pm  Total Billable Time: 15 minutes    PTA Visit #: 0/5     Subjective     Patient reports: that she has not had any symptoms the last week and is able to play pickle ball with no problems. Patient ready to make today her last day of Physical Therapy.    She was compliant with home exercise program.  Response to previous treatment: increased thoracic mobility  Functional change: increased exercise participation without symptoms    Pain: 0/10  Location: thoracic spine and neck     Objective      Cervical Range of Motion:     Degrees Pain/Dysfunction   Flexion 65 -      Extension 50 -      Right Rotation 70 -      Left Rotation 65 Stiffness      Right Side Bending 25 -   Left Side Bending 25 -     Upper Extremity Strength  (R) UE   (L) UE     Shoulder flexion: 5/5 Shoulder flexion: 5/5   Shoulder Abduction: 5/5 Shoulder abduction: 5/5   Shoulder ER 4/5 Shoulder ER 4/5     Treatment     Xochitl received the treatments listed below:      therapeutic exercises to develop strength, endurance, ROM, flexibility, and posture for 15 minutes including:    Reassessment and Discharge Testing 10 minutes  Education on home  exercise program and movement to continue to perform before pickleball    manual therapy techniques: Joint mobilizations, Manual traction, and Myofacial release were applied to the: neck/thoracic for 00 minutes, includinth right rib inspiratory resisted glide  Prone CTJ mobilization    NT:  Upper Cervical flexion MET  Upper Cervical Rotation MET   Supine Bear Lower Thoracic Manipulation grade 5  Prone UPA right lower thoracic    neuromuscular re-education activities to improve: Balance, Coordination, Kinesthetic, and Sense for 00 minutes. The following activities were included:    90/90 walkouts external rotation and internal rotation 2x10 both sides  Kneeling Cable row 2x10 both sides 10lbs   Kneeling side toss into wall green ball 2x10 both sides  PNF D2 extension red thera band 2x10    NT:  External rotation isometric yellow thera band scaption 2x8  Seated Cane rotation with SB and inspiration 5 minutes  BP Cuff 20 to 24 mmHg 10x 10 sec hold, 30 to 34 mmHg 10x 10 sec hold    Patient Education and Home Exercises       Education provided:   - home exercise program    Written Home Exercises Provided: yes. Exercises were reviewed and Xochitl was able to demonstrate them prior to the end of the session.  Xochitl demonstrated good  understanding of the education provided. See Electronic Medical Record under Patient Instructions for exercises provided during therapy sessions    Assessment     Patient presenting with no symptoms since previous visit and able to tolerate pickleball and exercise participation without symptoms. Patient has met all short term and long term goals set on IE. Patient discharged from Physical Therapy today.    GOALS: Short Term Goals: 4 weeks  1.Report decreased neck/thoracic pain  <   / =  3  /10  to increase tolerance for athletic pickle ball activities and gym participation MET  2. Increase cervical ROM by 5-10 degrees in order to perform ADLs with decreased difficulty. MET  3.  Increase strength in B shoulders/scapular stabilizers by 1/3 MMT grade to increase tolerance for ADL and work activities. MET  4. Pt to tolerate HEP to improve ROM and independence with ADL's MET     Long Term Goals: 8 weeks  1.Report decreased neck/thoracic pain  <   / =  1  /10  to increase tolerance for athletic activities and gym participation MET  2. Increase UE/neck flexibility in order to improve posture.  MET  3.Increased strength in B shoulders/scapular stabilizers to >/= 4/5 MMT grade to increase tolerance for ADL and work activities. MET  4.  Pt will report at  level (20-30% impaired) on FOTO neck score for neck pain disability to demonstrate decrease in disability and improvement in neck pain. MET    Plan     Patient discharged from Physical Therapy today.    Pa Arizmendi, PT

## 2024-04-02 ENCOUNTER — TELEPHONE (OUTPATIENT)
Dept: PAIN MEDICINE | Facility: CLINIC | Age: 69
End: 2024-04-02
Payer: MEDICARE

## 2024-04-02 NOTE — TELEPHONE ENCOUNTER
----- Message from Elda Moura sent at 4/2/2024  3:54 PM CDT -----  Name of Who is Calling: ZOLLINGER, REBECCA JOHNSTON [[id number]]            What is the request in detail: Patient is requesting call back to get another neck injection for robles apt              Can the clinic reply by [facility]: no              What Number to Call Back if not in [facility]: [phone]

## 2024-04-02 NOTE — TELEPHONE ENCOUNTER
Staff spoke with the patient in regards to her appointment tomorrow..      ----- Message from Elda Moura sent at 4/2/2024  3:54 PM CDT -----  Name of Who is Calling: ZOLLINGER, REBECCA JOHNSTON [[id number]]            What is the request in detail: Patient is requesting call back to get another neck injection for robles apt              Can the clinic reply by [facility]: no              What Number to Call Back if not in [facility]: [phone]

## 2024-04-03 ENCOUNTER — OFFICE VISIT (OUTPATIENT)
Dept: PAIN MEDICINE | Facility: CLINIC | Age: 69
End: 2024-04-03
Attending: ANESTHESIOLOGY
Payer: MEDICARE

## 2024-04-03 VITALS
HEART RATE: 60 BPM | DIASTOLIC BLOOD PRESSURE: 63 MMHG | TEMPERATURE: 98 F | BODY MASS INDEX: 25.77 KG/M2 | WEIGHT: 150.13 LBS | SYSTOLIC BLOOD PRESSURE: 115 MMHG

## 2024-04-03 DIAGNOSIS — M79.18 MYOFASCIAL PAIN: ICD-10-CM

## 2024-04-03 DIAGNOSIS — M71.9 CERVICOTHORACIC INTERSPINOUS BURSITIS: Primary | ICD-10-CM

## 2024-04-03 PROCEDURE — 20552 NJX 1/MLT TRIGGER POINT 1/2: CPT | Mod: GC,S$GLB,, | Performed by: ANESTHESIOLOGY

## 2024-04-03 PROCEDURE — 20600 DRAIN/INJ JOINT/BURSA W/O US: CPT | Mod: 59,GC,S$GLB, | Performed by: ANESTHESIOLOGY

## 2024-04-03 PROCEDURE — 99214 OFFICE O/P EST MOD 30 MIN: CPT | Mod: 25,GC,S$GLB, | Performed by: ANESTHESIOLOGY

## 2024-04-03 PROCEDURE — 4010F ACE/ARB THERAPY RXD/TAKEN: CPT | Mod: CPTII,S$GLB,, | Performed by: ANESTHESIOLOGY

## 2024-04-03 PROCEDURE — 3008F BODY MASS INDEX DOCD: CPT | Mod: CPTII,S$GLB,, | Performed by: ANESTHESIOLOGY

## 2024-04-03 PROCEDURE — 3288F FALL RISK ASSESSMENT DOCD: CPT | Mod: CPTII,S$GLB,, | Performed by: ANESTHESIOLOGY

## 2024-04-03 PROCEDURE — 3078F DIAST BP <80 MM HG: CPT | Mod: CPTII,S$GLB,, | Performed by: ANESTHESIOLOGY

## 2024-04-03 PROCEDURE — 1159F MED LIST DOCD IN RCRD: CPT | Mod: CPTII,S$GLB,, | Performed by: ANESTHESIOLOGY

## 2024-04-03 PROCEDURE — 3074F SYST BP LT 130 MM HG: CPT | Mod: CPTII,S$GLB,, | Performed by: ANESTHESIOLOGY

## 2024-04-03 PROCEDURE — 1125F AMNT PAIN NOTED PAIN PRSNT: CPT | Mod: CPTII,S$GLB,, | Performed by: ANESTHESIOLOGY

## 2024-04-03 PROCEDURE — 99999 PR PBB SHADOW E&M-EST. PATIENT-LVL III: CPT | Mod: PBBFAC,,, | Performed by: ANESTHESIOLOGY

## 2024-04-03 PROCEDURE — 1160F RVW MEDS BY RX/DR IN RCRD: CPT | Mod: CPTII,S$GLB,, | Performed by: ANESTHESIOLOGY

## 2024-04-03 PROCEDURE — 1101F PT FALLS ASSESS-DOCD LE1/YR: CPT | Mod: CPTII,S$GLB,, | Performed by: ANESTHESIOLOGY

## 2024-04-03 RX ORDER — TRIAMCINOLONE ACETONIDE 40 MG/ML
40 INJECTION, SUSPENSION INTRA-ARTICULAR; INTRAMUSCULAR
Status: COMPLETED | OUTPATIENT
Start: 2024-04-03 | End: 2024-04-03

## 2024-04-03 RX ADMIN — TRIAMCINOLONE ACETONIDE 40 MG: 40 INJECTION, SUSPENSION INTRA-ARTICULAR; INTRAMUSCULAR at 08:04

## 2024-04-03 NOTE — PROGRESS NOTES
Chronic Patient Established Note       SUBJECTIVE:    Interval History 4/3/2024:  Patient returns to clinic for follow up of neck and back pain. She is s/p TPI's on 2/8/24, which she reports has given her 100% percent relief for 2 months. Patient expresses that bilateral neck pain has returned + mid back pain. Patient expresses that with increased acitivity thorugh the day the muscles of the neck feel tighter. Patient expresses she continues to do home exercises + weights, and pickleball, feels like the exercises have not improved her pain.  Patient is not taking robaxin 500mg TID, she expresses taking flexeril at night as needed, and utilizes a heating pad. Patient states her current pain level is 4/10. Patient describes back pain has returned x 1 month, now is localized to the center of the back without radiation.     Interval History 3/5/2024:  The patient has a virtual follow up for neck and back pain. She is s/p TPIs for neck pain which she says helped 100% for that pain. Her primary complaint today is middle back pain. She had thoracic SANJUANA in the past but feels like this is slightly higher. However, she says that it is tolerable at this time. She completed PT today and says that she plans to keep up with her home exercises. Her overall pain today is 5/10.    Interval History 1/10/2024:  Rebecca Johnston Zollinger returns to clinic today s/p Right T12/L1 and L1/L2 TA SANJUANA on 12/18/2023.  She reports 85% relief that is ongoing.  She is back to walking 2 miles again.  She does find that when she exerts herself to her full extent that the pain returns slightly.  Today she continues to complain of neck pain from her previous visit and of new mid back pain.  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  She states that the pain is located bilaterally at the base of her skull and into her neck, left greater than right.  She  "describes the pain as pressure and it feels like "a clamp". Sometimes she has a headache by the end of the day.  The mid back pain is occasionally present in the AM and is relieve by stretching.  It is exacerbated by aerobic activity and pickleball.  The pain will wrap around right side of ribs and under her breast. .  She has not started  PT yet-she was supposed to start yesterday, but the appointment was cancelled due to weather. Denies bowel and bladder dysfunction, fever, chills, nightsweats or weight changes. She continues Robaxin Pain today is 5/10.    She denies any new weakness, denies fevers, numbness, saddle anesthesia or incontinence.     Interval History 12/15/2023:  The patient returns to clinic today for follow up of back pain. She reports increased neck pain over the last month. She reports neck pain and pain at the base of her head, left side greater than right. This pain is worse with bending forward. She has tried Excedrin and Flexeril with limited relief. She denies any radicular arm pain. She continues to report right sided mid and low back pain. She does have radiating pain into the right groin and anterior thigh. She is scheduled for SANJUANA next week. She continues to perform a home exercise routine. She denies any other health changes. Her pain today is 8/10.     Interval History 11/21/2023:  The patient returns to clinic today for follow up of back pain via virtual visit. She is here today for imaging review. She reports worsened right sided mid and low back pain. This radiates into the right groin and anterior thigh. She denies any left leg pain. Her pain is worse with prolonged walking. She is performing a home exercise routine. She denies any other health changes.     Interval History 8/28/2023:  Rebecca Johnston Zollinger presents to the clinic for a follow-up appointment for back pain via virtual visit. She is s/p right T12/L1 TF SANJUANA on 8/14/2023. She reports 100% relief of her mid back " pain. She continues to report low back pain that radiates into her groin bilaterally, right greater than left. This is worse with prolonged walking. She is no longer able to walk 4 miles, which is her typical exercise routine. She has completed multiple rounds of PT. She denies any other health changes.       Original HPI:  67 year old female who presents with Right sided lower back pain. 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 The pain is located in her R back and goes to the R hip and groin when she walks. At worst it is a 10/10, baseline is a 3/10. She describes it as aching. She used to be able to walk 4 miles, now she states she can only walk 1 mile without pain. Last saw pain management at  2 years ago, no interventions performed. She has tried multiple rounds of PT, most recently in 2022 with some improvement, completed over 6 weeks of PT. She takes occasional ibuprofen and tylenol. Used to take tramadol and opioids but is no longer taking. She has had an MRI back in 2021 showing protruding disc at T12-L1. She denies any new weakness, denies fevers, numbness, saddle anesthesia or incontinence.     Pain Disability Index Review:      1/10/2024     7:32 AM 12/15/2023     1:43 PM 7/5/2023     8:27 AM   Last 3 PDI Scores   Pain Disability Index (PDI) 6 24 52       Pain Medications:  None    Opioid Contract: not applicable     report:  Not applicable    Pain Procedures:   8/14/2023- Right T12/L1 TF SANJUANA    Physical Therapy/Home Exercise: yes    Imaging:    XR CERVICAL SPINE 5 VIEW WITH FLEX AND EXT-12/15/2023     CLINICAL HISTORY:  Spondylosis without myelopathy or radiculopathy, cervical region     TECHNIQUE:  Five views of the cervical spine plus flexion and extension views were performed.     COMPARISON:  None.     FINDINGS:  Vertebral body heights are maintained.     Disc space narrowing and endplate changes C5-6 and  C6-7.  Small posterior osteophytes at these levels.     Oblique views show bony narrowing of the neural foramina C5-6 on the right and C4-5 and C5-6 on the left.     Reversal of the normal cervical lordosis in the lower cervical spine.  Otherwise, AP alignment appears anatomic.     No significant prevertebral soft tissue edema.     Impression:     Degenerative change as above.       MRI Lumbar Spine 9/22/2023:  COMPARISON:  None.     FINDINGS:  Alignment: Grade 1 retrolisthesis of L5-S1.  Straightening of lumbar lordosis.     Vertebrae: Multilevel degenerative endplate changes no fracture or marrow infiltrative process.     Discs: Moderate to severe disc height loss noted throughout the lumbar spine.  No evidence for discitis.     Cord: Conus terminates at L1-L2 and appears unremarkable.  Cauda equina appears unremarkable.     Degenerative findings:     T11-T12: Right paracentral disc extrusion results in moderate effacement of the right lateral recess and mass effect upon the right T12 nerve root.     T12-L1: Circumferential disc bulge with right paracentral disc extrusion result in mild effacement of the right lateral recess.     L1-L2: Circumferential disc bulge and mild facet arthropathy result in mild bilateral neural foraminal narrowing.     L2-L3: Circumferential disc bulge and mild facet arthropathy result in mild left neural foraminal narrowing.     L3-L4: Circumferential disc bulge and moderate facet arthropathy result in mild spinal canal stenosis and mild bilateral neural foraminal narrowing.     L4-L5: Circumferential disc bulge and mild facet arthropathy result in mild effacement of the lateral recesses and mild bilateral neural foraminal narrowing peer     L5-S1: Circumferential disc bulge and mild facet arthropathy result in moderate right, mild left neural foraminal narrowing.     Paraspinal muscles & soft tissues: Moderate paraspinal muscle atrophy.  4.0 cm left renal cyst.     Impression:     1.  Multilevel degenerative changes of the lumbar and lower thoracic spine as detailed above.     Xray Hip 7/5/2023:  FINDINGS:  No acute fractures.  Some degenerative changes visualized lower lumbar spine to include endplate osteophytes and lumbosacral disc narrowing and possible vacuum phenomenon.  Intact right and left SI joints.  No definite narrowing of right or left hip joint spaces or acetabular spurring.  Preserved right and left femoral head contours.     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      aspirin (ECOTRIN) 81 MG EC tablet Take 81 mg by mouth once daily.      estradioL (ESTRACE) 0.01 % (0.1 mg/gram) vaginal cream Place vaginally once daily.      FLUoxetine 40 MG capsule Take 40 mg by mouth once daily.      hydrocodone-acetaminophen (HYCET) solution 7.5-325 mg/15mL Take by mouth 4 (four) times daily as needed for Pain.      ipratropium (ATROVENT) 42 mcg (0.06 %) nasal spray 2 sprays by Each Nostril route 4 (four) times daily.      irbesartan (AVAPRO) 150 MG tablet Take 150 mg by mouth every evening.      tiZANidine (ZANAFLEX) 2 MG tablet 1 at bedtime for 3 nights then 2 every night for 3 nights then 3 every night to follow. Stay at the most comfortable dose. 90 tablet 2     No current facility-administered medications for this visit.       REVIEW OF SYSTEMS:    GENERAL:  No weight loss, malaise or fevers.  HEENT:  Negative for frequent or significant headaches.  NECK:  Negative for lumps, goiter, pain and significant neck swelling.  RESPIRATORY:  Negative for cough, wheezing or shortness of  breath.  CARDIOVASCULAR:  Negative for chest pain, leg swelling or palpitations.  GI:  Negative for abdominal discomfort, blood in stools or black stools or change in bowel habits.  MUSCULOSKELETAL:  See HPI.  SKIN:  Negative for lesions, rash, and itching.  PSYCH:  Negative for sleep disturbance, mood disorder and recent psychosocial stressors.  HEMATOLOGY/LYMPHOLOGY:  Negative for prolonged bleeding, bruising easily or swollen nodes.  NEURO:   No history of headaches, syncope, paralysis, seizures or tremors.  All other reviewed and negative other than HPI.    Past Medical History:  No past medical history on file.    Past Surgical History:  Past Surgical History:   Procedure Laterality Date    TRANSFORAMINAL EPIDURAL INJECTION OF STEROID Right 8/14/2023    Procedure: INJECTION, STEROID, EPIDURAL, TRANSFORAMINAL APPROACH, RIGHT T12/L1 SOONER DATE;  Surgeon: Isi Womack MD;  Location: [facility];  Service: Pain Management;  Laterality: Right;    TRANSFORAMINAL EPIDURAL INJECTION OF STEROID Right 12/18/2023    Procedure: LUMBAR TRANSFORAMINAL RIGHT T12/L1 AND L1/2;  Surgeon: Isi Womack MD;  Location: [facility];  Service: Pain Management;  Laterality: Right;  [phone]  2 WK F/U DEEPALI       Family History:  No family history on file.    Social History:  Social History     Socioeconomic History    Marital status:    Tobacco Use    Smoking status: Former     Types: Cigarettes     Passive exposure: Past    Smokeless tobacco: Never    Tobacco comments:     Quit 20 years ago   Substance and Sexual Activity    Alcohol use: Not Currently    Drug use: Never    Sexual activity: Not Currently       OBJECTIVE:      PHYSICAL EXAMINATION:    General appearance: Well appearing, in no acute distress, alert and oriented x3.  Psych:  Mood and affect appropriate.  Skin: Skin color normal, no rashes or lesions, in both upper and lower body.  Extremities: Moves all visualized extremities freely.      PHYSICAL  EXAMINATION:    General appearance: Well appearing, in no acute distress, alert and oriented x3.  Psych:  Mood and affect appropriate.  Skin: Skin color, texture, turgor normal, no rashes or lesions, in both upper and lower body.  Head/face:  Atraumatic, normocephalic.    Neck: No pain with palpation over left splenius capitus and upper trapezius.  Full ROM to the right, limited ROM to the left, pain on flexion and extension.   Cor: Capillary refill <3 seconds.  Pulm: Symmetric chest rise, no respiratory distress noted.   Back: Straight leg raising in the sitting and supine positions is negative to radicular pain. There is pain with palpation over thoracic facet joints at approximated level of T8-9 Normal ROM   Extremities: No deformities, edema, or skin discoloration.   Musculoskeletal: FABERs is negative bilaterally.  Bilateral lower extremity strength is normal and symmetric.  No atrophy or tone abnormalities are noted.  Neuro:  No loss of sensation is noted.  Bilateral upper and lower extremity reflexes tested and are equal and symmetric. Plantar reflexes downgoing.  No clonus noted.  Gait: Normal.    ASSESSMENT: 68 y.o. year old female with back and neck pain, consistent with the following:     No diagnosis found.      PLAN:     We discussed with the patient the assessment and recommendations. The following is the plan we agreed on:     - TPI on L splenius capitus and thoracic paraspinus and interspinus ligament injection/triggerpoint injection + interspinous ligament bursa injection    - Plan to continue home exercises.     - I have stressed the importance of physical activity and a home exercise plan to help with pain and improve health.    - Continue Robaxin 500 mg TID PRN muscle pain.     - RTC as needed. She would like to call.     The above plan and management options were discussed at length with patient. Patient is in agreement with the above and verbalized understanding.    Procedure:    Patient Name:  Rebecca Johnston Zollinger  MRN: [id number]    INFORMED CONSENT: The procedure, risks, benefits and options were discussed with patient. There are no contraindications to the procedure. The patient expressed understanding and agreed to proceed. The personnel performing the procedure was discussed. I verify that I personally obtained Xochitl's consent prior to the start of the procedure and the signed consent can be found on the patient's chart.    Procedure Date: 04/03/2024    Anesthesia: Topical    Pre Procedure diagnosis:   1. Cervicothoracic interspinous bursitis    2. Myofascial pain        Post-Procedure diagnosis: same      Sedation: None    PROCEDURE: TRIGGER POINT INJECTION  The patient was placed in a seated position. The site of pain and procedure were confirmed with the patient prior to starting the procedure. After performing time out. The patient's  trigger points were identified and marked. The skin was prepped with chlorhexidine three times.   After performing time out A 25-gauge 1.5 inch  needle was advanced through the skin and subcutaneous tissues.  Aspiration for blood, air and CSF was negative.  A total of 10 ml of Bupivacaine 0.25% and 20 mg Kenalog  was injected at 1 ml at each trigger point including thoracic interspinous ligament bursa. The remaining meds wasted.  No complications were evident. No specimens collected.    Blood Loss: Nill  Specimen: None    MD Huyen Fatima  04/03/2024    I have personally taken the history and examined this patient and agree with the resident's note as stated above.

## 2024-07-22 ENCOUNTER — TELEPHONE (OUTPATIENT)
Dept: PAIN MEDICINE | Facility: CLINIC | Age: 69
End: 2024-07-22
Payer: MEDICARE

## 2024-07-22 NOTE — TELEPHONE ENCOUNTER
----- Message from Chayo Mcdonough sent at 7/22/2024 10:52 AM CDT -----  Regarding: PT  Name of Who is Calling:Pt         What is the request in detail: Requesting callback in reference to torn rotator cuff, Pt inquiring if office provides services. Please advise            Can the clinic reply by [facility]: yes         What Number to Call Back if not in [facility]:Telephone Information:  Mobile          [phone]

## 2024-07-23 ENCOUNTER — TELEPHONE (OUTPATIENT)
Dept: PAIN MEDICINE | Facility: CLINIC | Age: 69
End: 2024-07-23
Payer: MEDICARE

## 2024-07-23 ENCOUNTER — OFFICE VISIT (OUTPATIENT)
Dept: PAIN MEDICINE | Facility: CLINIC | Age: 69
End: 2024-07-23
Payer: MEDICARE

## 2024-07-23 DIAGNOSIS — M54.14 THORACIC RADICULOPATHY: Primary | ICD-10-CM

## 2024-07-23 DIAGNOSIS — M79.18 MYOFASCIAL PAIN: ICD-10-CM

## 2024-07-23 DIAGNOSIS — G89.29 CHRONIC RIGHT SHOULDER PAIN: ICD-10-CM

## 2024-07-23 DIAGNOSIS — M25.511 CHRONIC RIGHT SHOULDER PAIN: ICD-10-CM

## 2024-07-23 PROCEDURE — 99213 OFFICE O/P EST LOW 20 MIN: CPT | Mod: 95,,, | Performed by: NURSE PRACTITIONER

## 2024-07-23 PROCEDURE — 4010F ACE/ARB THERAPY RXD/TAKEN: CPT | Mod: CPTII,95,, | Performed by: NURSE PRACTITIONER

## 2024-07-23 PROCEDURE — 1159F MED LIST DOCD IN RCRD: CPT | Mod: CPTII,95,, | Performed by: NURSE PRACTITIONER

## 2024-07-23 PROCEDURE — 1160F RVW MEDS BY RX/DR IN RCRD: CPT | Mod: CPTII,95,, | Performed by: NURSE PRACTITIONER

## 2024-07-23 NOTE — TELEPHONE ENCOUNTER
----- Message from Daniel Padgett sent at 7/23/2024  7:43 AM CDT -----  Regarding: Return Call    Who Called:  Patient      Who Left Message for Patient:  Dolores      Does the patient know what this is regarding?  Returning Call        Best Call Back Number:  [phone]         Additional Information: Patient is returning a call.  Please assist.

## 2024-07-23 NOTE — PROGRESS NOTES
Chronic Patient Established Note   Chronic Pain-Tele-Medicine-Established Note (Follow up visit)        The patient location is: Home  The chief complaint leading to consultation is: pain  Visit type: Virtual visit with synchronous audio and video  Total time spent with patient: 25 min  Each patient to whom he or she provides medical services by telemedicine is:  (1) informed of the relationship between the physician and patient and the respective role of any other health care provider with respect to management of the patient; and (2) notified that he or she may decline to receive medical services by telemedicine and may withdraw from such care at any time.        SUBJECTIVE:    Interval History 7/23/2024:  The patient returns to clinic today for follow up of pain via virtual visit. She reports increased right shoulder pain over the few days. She was reaching down to get something off the floor. She felt something give in her shoulder. This pain felt similar to her pain before rotator cuff surgery. She did take Flexeril and 1/2 a Norco last night with benefit. The pain is much improved today. She also reports increased mid back pain over the last 2 months. She reports mid back pain that radiates into her ribs, right greater than left. She is performing home exercise. She denies any other health changes.     Interval History 4/3/2024:  Patient returns to clinic for follow up of neck and back pain. She is s/p TPI's on 2/8/24, which she reports has given her 100% percent relief for 2 months. Patient expresses that bilateral neck pain has returned + mid back pain. Patient expresses that with increased acitivity thorugh the day the muscles of the neck feel tighter. Patient expresses she continues to do home exercises + weights, and pickleball, feels like the exercises have not improved her pain.  Patient is not taking robaxin 500mg TID, she expresses taking flexeril at night as needed, and utilizes a heating pad.  "Patient states her current pain level is 4/10. Patient describes back pain has returned x 1 month, now is localized to the center of the back without radiation.     Interval History 3/5/2024:  The patient has a virtual follow up for neck and back pain. She is s/p TPIs for neck pain which she says helped 100% for that pain. Her primary complaint today is middle back pain. She had thoracic SANJUANA in the past but feels like this is slightly higher. However, she says that it is tolerable at this time. She completed PT today and says that she plans to keep up with her home exercises. Her overall pain today is 5/10.    Interval History 1/10/2024:  Rebecca Johnston Zollinger returns to clinic today s/p Right T12/L1 and L1/L2 TA SANJUANA on 12/18/2023.  She reports 85% relief that is ongoing.  She is back to walking 2 miles again.  She does find that when she exerts herself to her full extent that the pain returns slightly.  Today she continues to complain of neck pain from her previous visit and of new mid back pain.  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  She states that the pain is located bilaterally at the base of her skull and into her neck, left greater than right.  She describes the pain as pressure and it feels like "a clamp". Sometimes she has a headache by the end of the day.  The mid back pain is occasionally present in the AM and is relieve by stretching.  It is exacerbated by aerobic activity and pickleball.  The pain will wrap around right side of ribs and under her breast. .  She has not started  PT yet-she was supposed to start yesterday, but the appointment was cancelled due to weather. Denies bowel and bladder dysfunction, fever, chills, nightsweats or weight changes. She continues Robaxin Pain today is 5/10.    She denies any new weakness, denies fevers, numbness, saddle anesthesia or incontinence.     Interval " History 12/15/2023:  The patient returns to clinic today for follow up of back pain. She reports increased neck pain over the last month. She reports neck pain and pain at the base of her head, left side greater than right. This pain is worse with bending forward. She has tried Excedrin and Flexeril with limited relief. She denies any radicular arm pain. She continues to report right sided mid and low back pain. She does have radiating pain into the right groin and anterior thigh. She is scheduled for SANJUANA next week. She continues to perform a home exercise routine. She denies any other health changes. Her pain today is 8/10.     Interval History 11/21/2023:  The patient returns to clinic today for follow up of back pain via virtual visit. She is here today for imaging review. She reports worsened right sided mid and low back pain. This radiates into the right groin and anterior thigh. She denies any left leg pain. Her pain is worse with prolonged walking. She is performing a home exercise routine. She denies any other health changes.     Interval History 8/28/2023:  Rebecca Johnston Zollinger presents to the clinic for a follow-up appointment for back pain via virtual visit. She is s/p right T12/L1 TF SANJUANA on 8/14/2023. She reports 100% relief of her mid back pain. She continues to report low back pain that radiates into her groin bilaterally, right greater than left. This is worse with prolonged walking. She is no longer able to walk 4 miles, which is her typical exercise routine. She has completed multiple rounds of PT. She denies any other health changes.       Original HPI:  67 year old female who presents with Right sided lower back pain. 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 The pain is located in her R back and goes to the R hip and groin when she walks. At worst it is a 10/10, baseline is a 3/10. She describes it as  aching. She used to be able to walk 4 miles, now she states she can only walk 1 mile without pain. Last saw pain management at  2 years ago, no interventions performed. She has tried multiple rounds of PT, most recently in 2022 with some improvement, completed over 6 weeks of PT. She takes occasional ibuprofen and tylenol. Used to take tramadol and opioids but is no longer taking. She has had an MRI back in 2021 showing protruding disc at T12-L1. She denies any new weakness, denies fevers, numbness, saddle anesthesia or incontinence.     Pain Disability Index Review:      4/3/2024     8:09 AM 1/10/2024     7:32 AM 12/15/2023     1:43 PM   Last 3 PDI Scores   Pain Disability Index (PDI) 63 6 24       Pain Medications:  None    Opioid Contract: not applicable     report:  Not applicable    Pain Procedures:   8/14/2023- Right T12/L1 TF SANJUANA  12/18/2023- Right T12/L1 and L1/2 TF SANJUANA- 85% relief    Physical Therapy/Home Exercise: yes    Imaging:    XR CERVICAL SPINE 5 VIEW WITH FLEX AND EXT-12/15/2023     CLINICAL HISTORY:  Spondylosis without myelopathy or radiculopathy, cervical region     TECHNIQUE:  Five views of the cervical spine plus flexion and extension views were performed.     COMPARISON:  None.     FINDINGS:  Vertebral body heights are maintained.     Disc space narrowing and endplate changes C5-6 and C6-7.  Small posterior osteophytes at these levels.     Oblique views show bony narrowing of the neural foramina C5-6 on the right and C4-5 and C5-6 on the left.     Reversal of the normal cervical lordosis in the lower cervical spine.  Otherwise, AP alignment appears anatomic.     No significant prevertebral soft tissue edema.     Impression:     Degenerative change as above.       MRI Lumbar Spine 9/22/2023:  COMPARISON:  None.     FINDINGS:  Alignment: Grade 1 retrolisthesis of L5-S1.  Straightening of lumbar lordosis.     Vertebrae: Multilevel degenerative endplate changes no fracture or marrow  infiltrative process.     Discs: Moderate to severe disc height loss noted throughout the lumbar spine.  No evidence for discitis.     Cord: Conus terminates at L1-L2 and appears unremarkable.  Cauda equina appears unremarkable.     Degenerative findings:     T11-T12: Right paracentral disc extrusion results in moderate effacement of the right lateral recess and mass effect upon the right T12 nerve root.     T12-L1: Circumferential disc bulge with right paracentral disc extrusion result in mild effacement of the right lateral recess.     L1-L2: Circumferential disc bulge and mild facet arthropathy result in mild bilateral neural foraminal narrowing.     L2-L3: Circumferential disc bulge and mild facet arthropathy result in mild left neural foraminal narrowing.     L3-L4: Circumferential disc bulge and moderate facet arthropathy result in mild spinal canal stenosis and mild bilateral neural foraminal narrowing.     L4-L5: Circumferential disc bulge and mild facet arthropathy result in mild effacement of the lateral recesses and mild bilateral neural foraminal narrowing peer     L5-S1: Circumferential disc bulge and mild facet arthropathy result in moderate right, mild left neural foraminal narrowing.     Paraspinal muscles & soft tissues: Moderate paraspinal muscle atrophy.  4.0 cm left renal cyst.     Impression:     1. Multilevel degenerative changes of the lumbar and lower thoracic spine as detailed above.     Xray Hip 7/5/2023:  FINDINGS:  No acute fractures.  Some degenerative changes visualized lower lumbar spine to include endplate osteophytes and lumbosacral disc narrowing and possible vacuum phenomenon.  Intact right and left SI joints.  No definite narrowing of right or left hip joint spaces or acetabular spurring.  Preserved right and left femoral head contours.     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      aspirin (ECOTRIN) 81 MG EC tablet Take 81 mg by mouth once daily.      estradioL (ESTRACE) 0.01 % (0.1 mg/gram) vaginal cream Place vaginally once daily.      FLUoxetine 40 MG capsule Take 40 mg by mouth once daily.      hydrocodone-acetaminophen (HYCET) solution 7.5-325 mg/15mL Take by mouth 4 (four) times daily as needed for Pain.      ipratropium (ATROVENT) 42 mcg (0.06 %) nasal spray 2 sprays by Each Nostril route 4 (four) times daily.      irbesartan (AVAPRO) 150 MG tablet Take 150 mg by mouth every evening.      tiZANidine (ZANAFLEX) 2 MG tablet 1 at bedtime for 3 nights then 2 every night for 3 nights then 3 every night to follow. Stay at the most comfortable dose. 90 tablet 2     No current facility-administered medications for this visit.       REVIEW OF SYSTEMS:    GENERAL:  No weight loss, malaise or fevers.  HEENT:  Negative for frequent or significant headaches.  NECK:  Negative for lumps, goiter, pain and significant neck swelling.  RESPIRATORY:  Negative for cough, wheezing or shortness of breath.  CARDIOVASCULAR:  Negative for chest pain, leg swelling or palpitations.  GI:  Negative for abdominal discomfort, blood in stools or black stools or change in bowel habits.  MUSCULOSKELETAL:  See HPI.  SKIN:  Negative for lesions, rash, and itching.  PSYCH:  Negative for sleep disturbance, mood disorder and recent psychosocial stressors.  HEMATOLOGY/LYMPHOLOGY:  Negative for prolonged bleeding, bruising easily or swollen nodes.  NEURO:   No history of headaches, syncope, paralysis, seizures or tremors.  All other reviewed and negative other than HPI.    Past Medical History:  No past medical history on  file.    Past Surgical History:  Past Surgical History:   Procedure Laterality Date    TRANSFORAMINAL EPIDURAL INJECTION OF STEROID Right 8/14/2023    Procedure: INJECTION, STEROID, EPIDURAL, TRANSFORAMINAL APPROACH, RIGHT T12/L1 SOONER DATE;  Surgeon: Isi Womack MD;  Location: [facility], operated by [facility];  Service: Pain Management;  Laterality: Right;    TRANSFORAMINAL EPIDURAL INJECTION OF STEROID Right 12/18/2023    Procedure: LUMBAR TRANSFORAMINAL RIGHT T12/L1 AND L1/2;  Surgeon: Isi Womack MD;  Location: [facility], operated by [facility];  Service: Pain Management;  Laterality: Right;  [phone]  2 WK F/U DEEPALI       Family History:  No family history on file.    Social History:  Social History     Socioeconomic History    Marital status:    Tobacco Use    Smoking status: Former     Types: Cigarettes     Passive exposure: Past    Smokeless tobacco: Never    Tobacco comments:     Quit 20 years ago   Substance and Sexual Activity    Alcohol use: Not Currently    Drug use: Never    Sexual activity: Not Currently       OBJECTIVE:    Exam limited due to virtual visit:  General appearance: Well appearing, in no acute distress, alert and oriented x3.  Psych:  Mood and affect appropriate.  Extremities: Moves all visualized extremities freely.    Previous physical exam:  PHYSICAL EXAMINATION:    General appearance: Well appearing, in no acute distress, alert and oriented x3.  Psych:  Mood and affect appropriate.  Skin: Skin color, texture, turgor normal, no rashes or lesions, in both upper and lower body.  Head/face:  Atraumatic, normocephalic.    Neck: No pain with palpation over left splenius capitus and upper trapezius.  Full ROM to the right, limited ROM to the left, pain on flexion and extension.   Cor: Capillary refill <3 seconds.  Pulm: Symmetric chest rise, no respiratory distress noted.   Back: Straight leg raising in the sitting and supine positions is negative to radicular pain. There is pain with palpation over thoracic facet  joints at approximated level of T8-9 Normal ROM   Extremities: No deformities, edema, or skin discoloration.   Musculoskeletal: FABERs is negative bilaterally.  Bilateral lower extremity strength is normal and symmetric.  No atrophy or tone abnormalities are noted.  Neuro:  No loss of sensation is noted.  Bilateral upper and lower extremity reflexes tested and are equal and symmetric. Plantar reflexes downgoing.  No clonus noted.  Gait: Normal.    ASSESSMENT: 68 y.o. year old female with back and neck pain, consistent with the followin. Thoracic radiculopathy        2. Myofascial pain        3. Chronic right shoulder pain              PLAN:     - Previous imaging was reviewed and discussed with the patient today.    - Schedule for right T12/L1 and L1/2 TF SANJUANA. Previous SANJUANA provided 85% relief for 5 months.     - Consider right glenohumeral joint injection in the future if shoulder pain continues.     - Continue Flexeril.     - Continue home exercise routine.     - RTC 2 weeks after above procedure.     The above plan and management options were discussed at length with patient. Patient is in agreement with the above and verbalized understanding.    Maggy Weeks NP  2024

## 2024-07-23 NOTE — TELEPHONE ENCOUNTER
Staff spoke with patient. Patient wants to talk with someone about treatment options for her right shoulder pain. Staff scheduled her with Maggy on 7/23. Patient verbalized understanding.

## 2024-07-24 DIAGNOSIS — M54.14 THORACIC RADICULOPATHY: Primary | ICD-10-CM

## 2024-07-25 ENCOUNTER — TELEPHONE (OUTPATIENT)
Dept: PAIN MEDICINE | Facility: CLINIC | Age: 69
End: 2024-07-25
Payer: MEDICARE

## 2024-07-25 NOTE — TELEPHONE ENCOUNTER
----- Message from Chayo Mcdonough sent at 7/25/2024 10:43 AM CDT -----  Regarding: appt  Name of Who is Calling:Pt        What is the request in detail: Requesting next open appt for injection for right shoulder  Pls Advise.          Can the clinic reply by [facility]: yes        What Number to Call Back if not in [facility]:Telephone Information:  Mobile          [phone]

## 2024-07-25 NOTE — TELEPHONE ENCOUNTER
----- Message from Chayo Mcdonough sent at 7/25/2024 10:41 AM CDT -----  Regarding: appt  Name of Who is Calling:Pt        What is the request in detail: Requesting to cancel procedure on 8-8  Please advise.          Can the clinic reply by [facility]: yes        What Number to Call Back if not in [facility]:Telephone Information:  Mobile          [phone]

## 2024-08-07 ENCOUNTER — HOSPITAL ENCOUNTER (OUTPATIENT)
Dept: RADIOLOGY | Facility: HOSPITAL | Age: 69
Discharge: HOME | End: 2024-08-07
Attending: PHYSICIAN ASSISTANT
Payer: MEDICARE

## 2024-08-07 ENCOUNTER — OFFICE VISIT (OUTPATIENT)
Dept: SPORTS MEDICINE | Facility: CLINIC | Age: 69
End: 2024-08-07
Payer: MEDICARE

## 2024-08-07 ENCOUNTER — TELEPHONE (OUTPATIENT)
Dept: PAIN MEDICINE | Facility: CLINIC | Age: 69
End: 2024-08-07
Payer: MEDICARE

## 2024-08-07 VITALS
DIASTOLIC BLOOD PRESSURE: 70 MMHG | HEART RATE: 61 BPM | WEIGHT: 151.69 LBS | BODY MASS INDEX: 25.9 KG/M2 | SYSTOLIC BLOOD PRESSURE: 109 MMHG | HEIGHT: 64 IN

## 2024-08-07 DIAGNOSIS — M25.511 CHRONIC RIGHT SHOULDER PAIN: Primary | ICD-10-CM

## 2024-08-07 DIAGNOSIS — M19.011 OSTEOARTHRITIS OF RIGHT GLENOHUMERAL JOINT: ICD-10-CM

## 2024-08-07 DIAGNOSIS — G89.29 CHRONIC RIGHT SHOULDER PAIN: Primary | ICD-10-CM

## 2024-08-07 DIAGNOSIS — R52 PAIN: ICD-10-CM

## 2024-08-07 PROCEDURE — 73030 X-RAY EXAM OF SHOULDER: CPT | Mod: TC,RT

## 2024-08-07 PROCEDURE — 99999 PR PBB SHADOW E&M-EST. PATIENT-LVL III: CPT | Mod: PBBFAC,,, | Performed by: PHYSICIAN ASSISTANT

## 2024-08-07 PROCEDURE — 73030 X-RAY EXAM OF SHOULDER: CPT | Mod: 26,RT,, | Performed by: RADIOLOGY

## 2024-08-07 RX ORDER — TRIAMCINOLONE ACETONIDE 40 MG/ML
40 INJECTION, SUSPENSION INTRA-ARTICULAR; INTRAMUSCULAR
Status: DISCONTINUED | OUTPATIENT
Start: 2024-08-07 | End: 2024-08-07 | Stop reason: HOSPADM

## 2024-08-07 RX ORDER — BUPIVACAINE HYDROCHLORIDE 2.5 MG/ML
2 INJECTION, SOLUTION INFILTRATION; PERINEURAL
Status: DISCONTINUED | OUTPATIENT
Start: 2024-08-07 | End: 2024-08-07 | Stop reason: HOSPADM

## 2024-08-07 RX ADMIN — BUPIVACAINE HYDROCHLORIDE 2 ML: 2.5 INJECTION, SOLUTION INFILTRATION; PERINEURAL at 11:08

## 2024-08-07 RX ADMIN — TRIAMCINOLONE ACETONIDE 40 MG: 40 INJECTION, SUSPENSION INTRA-ARTICULAR; INTRAMUSCULAR at 11:08

## 2024-08-08 ENCOUNTER — PATIENT MESSAGE (OUTPATIENT)
Dept: PAIN MEDICINE | Facility: OTHER | Age: 69
End: 2024-08-08
Payer: MEDICARE

## 2024-08-08 DIAGNOSIS — M54.14 THORACIC RADICULOPATHY: Primary | ICD-10-CM

## 2024-08-27 ENCOUNTER — PATIENT MESSAGE (OUTPATIENT)
Dept: PAIN MEDICINE | Facility: OTHER | Age: 69
End: 2024-08-27
Payer: MEDICARE

## 2024-08-29 ENCOUNTER — HOSPITAL ENCOUNTER (OUTPATIENT)
Facility: OTHER | Age: 69
Discharge: HOME | End: 2024-08-29
Attending: ANESTHESIOLOGY | Admitting: ANESTHESIOLOGY
Payer: MEDICARE

## 2024-08-29 VITALS
HEART RATE: 60 BPM | TEMPERATURE: 98 F | RESPIRATION RATE: 16 BRPM | OXYGEN SATURATION: 99 % | WEIGHT: 150 LBS | BODY MASS INDEX: 25.61 KG/M2 | HEIGHT: 64 IN | DIASTOLIC BLOOD PRESSURE: 79 MMHG | SYSTOLIC BLOOD PRESSURE: 134 MMHG

## 2024-08-29 DIAGNOSIS — M54.14 THORACIC RADICULOPATHY: Primary | ICD-10-CM

## 2024-08-29 DIAGNOSIS — G89.29 CHRONIC PAIN: ICD-10-CM

## 2024-08-29 PROCEDURE — 64483 NJX AA&/STRD TFRM EPI L/S 1: CPT | Mod: RT,,, | Performed by: ANESTHESIOLOGY

## 2024-08-29 PROCEDURE — 64479 NJX AA&/STRD TFRM EPI C/T 1: CPT | Mod: RT,,, | Performed by: ANESTHESIOLOGY

## 2024-08-29 PROCEDURE — 63600175 PHARM REV CODE 636 W HCPCS: Performed by: ANESTHESIOLOGY

## 2024-08-29 PROCEDURE — 25000003 PHARM REV CODE 250: Performed by: ANESTHESIOLOGY

## 2024-08-29 PROCEDURE — 64479 NJX AA&/STRD TFRM EPI C/T 1: CPT | Mod: RT | Performed by: ANESTHESIOLOGY

## 2024-08-29 PROCEDURE — 25500020 PHARM REV CODE 255: Performed by: ANESTHESIOLOGY

## 2024-08-29 PROCEDURE — 64483 NJX AA&/STRD TFRM EPI L/S 1: CPT | Mod: RT | Performed by: ANESTHESIOLOGY

## 2024-08-29 PROCEDURE — 64484 NJX AA&/STRD TFRM EPI L/S EA: CPT | Mod: RT | Performed by: ANESTHESIOLOGY

## 2024-08-29 RX ORDER — SODIUM CHLORIDE 9 MG/ML
INJECTION, SOLUTION INTRAVENOUS CONTINUOUS
Status: DISCONTINUED | OUTPATIENT
Start: 2024-08-29 | End: 2024-08-29 | Stop reason: HOSPADM

## 2024-08-29 RX ORDER — MIDAZOLAM HYDROCHLORIDE 1 MG/ML
INJECTION INTRAMUSCULAR; INTRAVENOUS
Status: DISCONTINUED | OUTPATIENT
Start: 2024-08-29 | End: 2024-08-29 | Stop reason: HOSPADM

## 2024-08-29 RX ORDER — LIDOCAINE HYDROCHLORIDE 20 MG/ML
INJECTION, SOLUTION INFILTRATION; PERINEURAL
Status: DISCONTINUED | OUTPATIENT
Start: 2024-08-29 | End: 2024-08-29 | Stop reason: HOSPADM

## 2024-08-29 RX ORDER — DEXAMETHASONE SODIUM PHOSPHATE 10 MG/ML
INJECTION INTRAMUSCULAR; INTRAVENOUS
Status: DISCONTINUED | OUTPATIENT
Start: 2024-08-29 | End: 2024-08-29 | Stop reason: HOSPADM

## 2024-08-29 RX ORDER — LIDOCAINE HYDROCHLORIDE 10 MG/ML
INJECTION, SOLUTION EPIDURAL; INFILTRATION; INTRACAUDAL; PERINEURAL
Status: DISCONTINUED | OUTPATIENT
Start: 2024-08-29 | End: 2024-08-29 | Stop reason: HOSPADM

## 2024-08-29 NOTE — DISCHARGE INSTRUCTIONS

## 2024-08-29 NOTE — DISCHARGE SUMMARY
Discharge Note  Short Stay      SUMMARY     Admit Date: 8/29/2024    Attending Physician: Isi Womack      Discharge Physician: Isi Womack      Discharge Date: 8/29/2024 10:06 AM    Procedure(s) (LRB):  THORACIC TRANSFORAMINAL RIGHT T12/L1 AND L1/2 *ASPIRIN OTC* HOLD FOR 5 DAYS (Right)    Final Diagnosis: Thoracic radiculopathy [M54.14]    Disposition: Home or self care    Patient Instructions:   Current Discharge Medication List        CONTINUE these medications which have NOT CHANGED    Details   alendronate (FOSAMAX) 70 MG tablet Take 70 mg by mouth every 7 days.      aspirin (ECOTRIN) 81 MG EC tablet Take 81 mg by mouth once daily.      estradioL (ESTRACE) 0.01 % (0.1 mg/gram) vaginal cream Place vaginally once daily.      FLUoxetine 40 MG capsule Take 40 mg by mouth once daily.      hydrocodone-acetaminophen (HYCET) solution 7.5-325 mg/15mL Take by mouth 4 (four) times daily as needed for Pain.      ipratropium (ATROVENT) 42 mcg (0.06 %) nasal spray 2 sprays by Each Nostril route 4 (four) times daily.      irbesartan (AVAPRO) 150 MG tablet Take 150 mg by mouth every evening.      tiZANidine (ZANAFLEX) 2 MG tablet 1 at bedtime for 3 nights then 2 every night for 3 nights then 3 every night to follow. Stay at the most comfortable dose.  Qty: 90 tablet, Refills: 2                 Discharge Diagnosis: Thoracic radiculopathy [M54.14]  Condition on Discharge: Stable with no complications to procedure   Diet on Discharge: Same as before.  Activity: as per instruction sheet.  Discharge to: Home with a responsible adult.  Follow up: 2-4 weeks       Please call my office or pager at [phone] if experienced any weakness or loss of sensation, fever > 101.5, pain uncontrolled with oral medications, persistent nausea/vomiting/or diarrhea, redness or drainage from the incisions, or any other worrisome concerns. If physician on call was not reached or could not communicate with our office for any reason please go to the  nearest emergency department

## 2024-08-29 NOTE — OP NOTE
Lumbar Transforaminal Epidural Steroid Injection under Fluoroscopic Guidance    The procedure, risks, benefits, and options were discussed with the patient. There are no contraindications to the procedure. The patent expressed understanding and agreed to the procedure. Informed written consent was obtained prior to the start of the procedure and can be found in the patient's chart.    PATIENT NAME: Rebecca Zollinger   MRN: [id number]     DATE OF PROCEDURE: 08/29/2024    PROCEDURE:  Right  T12/L1 and L1/2 Lumbar Transforaminal Epidural Steroid Injection under Fluoroscopic Guidance    PRE-OP DIAGNOSIS: Thoracic radiculopathy [M54.14] Lumbar radiculopathy [M54.16]    POST-OP DIAGNOSIS: Same    PHYSICIAN: Isi Womack MD    ASSISTANTS: none     MEDICATIONS INJECTED: Preservative-free Decadron 10mg with 5cc of Lidocaine 1% MPF     LOCAL ANESTHETIC INJECTED: Xylocaine 2%     SEDATION: Versed 2mg and Fentanyl 0mcg                                                                                                                                                                                     Conscious sedation ordered by M.STEPHANIE. Patient re-evaluation prior to administration of conscious sedation. No changes noted in patient's status from initial evaluation. The patient's vital signs were monitored by RN and patient remained hemodynamically stable throughout the procedure.    Event Time In   Sedation Start 0955   Sedation End 1000       ESTIMATED BLOOD LOSS: None    COMPLICATIONS: None    TECHNIQUE: Time-out was performed to identify the patient and procedure to be performed. With the patient laying in a prone position, the surgical area was prepped and draped in the usual sterile fashion using ChloraPrep and a fenestrated drape.The levels were determined under fluoroscopy guidance. Skin anesthesia was achieved by injecting Lidocaine 2% over the injection sites. The transforaminal spaces were then approached with a 25 gauge, 3.5  inch spinal quinke needle that was introduced under fluoroscopic guidance in the AP and Lateral views. Once the needle tip was in the area of the transforaminal space, and there was no blood, CSF or paraesthesias, contrast dye Omnipaque (300mg/mL) was injected to confirm placement and there was no vascular runoff. Fluoroscopic imaging in the AP and lateral views revealed a clear outline of the spinal nerve with proximal spread of agent through the neural foramen into the epidural space. 3 mL of the medication mixture listed above was injected slowly at each site. Displacement of the radio opaque contrast after injection of the medication confirmed that the medication went into the area of the transforaminal spaces. The needles were removed and bleeding was nil. A sterile dressing was applied. No specimens collected. The patient tolerated the procedure well.     PRE-PROCEDURE PAIN SCORE: 2-6/10    POST-PROCEDURE PAIN SCORE: 0/10    The patient was monitored after the procedure in the recovery area. They were given post-procedure and discharge instructions to follow at home. The patient was discharged in a stable condition.        Isi Womack MD

## 2024-08-29 NOTE — H&P
"HPI  Rebecca Johnston Zollinger presents for Procedure(s):  THORACIC TRANSFORAMINAL RIGHT T12/L1 AND L1/2 *ASPIRIN OTC* HOLD FOR 5 DAYS    Recent anticoagulation/antiplatelets reviewed and verified with nursing.  Recent antibiotics/recent infections reviewed and verified with nursing.    Past Medical History:   Diagnosis Date    Anticoagulant long-term use     Hypertension      Past Surgical History:   Procedure Laterality Date    TRANSFORAMINAL EPIDURAL INJECTION OF STEROID Right 8/14/2023    Procedure: INJECTION, STEROID, EPIDURAL, TRANSFORAMINAL APPROACH, RIGHT T12/L1 SOONER DATE;  Surgeon: Isi Womack MD;  Location: [facility] [place] [facility];  Service: Pain Management;  Laterality: Right;    TRANSFORAMINAL EPIDURAL INJECTION OF STEROID Right 12/18/2023    Procedure: LUMBAR TRANSFORAMINAL RIGHT T12/L1 AND L1/2;  Surgeon: Isi Womack MD;  Location: [facility] [place] [facility];  Service: Pain Management;  Laterality: Right;  [phone]  2 WK F/U DEEPALI     Review of patient's allergies indicates:  No Known Allergies     PMHx, PSHx, Allergies, Medications reviewed in epic      ROS negative except pain complaints in HPI    OBJECTIVE:    BP (!) 151/74 (BP Location: Right arm, Patient Position: Sitting)   Pulse 66   Temp 97.8 °F (36.6 °C) (Oral)   Resp 16   Ht 5' 4" (1.626 m)   Wt 68 kg (150 lb)   SpO2 97%   BMI 25.75 kg/m²     PHYSICAL EXAMINATION:    GENERAL: Well appearing, in no acute distress, alert and oriented to name, situation, location.  PSYCH:  Mood and affect appropriate.  SKIN: Skin color, texture, turgor normal, no rashes or lesions at site of procedure.  CV: regular rate with palpation of the radial artery.  PULM: No evidence of respiratory difficulty, symmetric chest rise. No audible abnormal respiratory sounds.  NEURO: Cranial nerves grossly intact.    Plan:    Proceed with procedure as planned    Katarzyna Cook  08/29/2024    "

## 2024-09-13 ENCOUNTER — OFFICE VISIT (OUTPATIENT)
Dept: PAIN MEDICINE | Facility: CLINIC | Age: 69
End: 2024-09-13
Payer: MEDICARE

## 2024-09-13 VITALS
DIASTOLIC BLOOD PRESSURE: 77 MMHG | OXYGEN SATURATION: 99 % | BODY MASS INDEX: 25.92 KG/M2 | TEMPERATURE: 97 F | SYSTOLIC BLOOD PRESSURE: 121 MMHG | WEIGHT: 151 LBS | HEART RATE: 64 BPM

## 2024-09-13 DIAGNOSIS — M54.6 PAIN IN THORACIC SPINE: ICD-10-CM

## 2024-09-13 DIAGNOSIS — M54.14 THORACIC RADICULOPATHY: ICD-10-CM

## 2024-09-13 DIAGNOSIS — M54.81 OCCIPITAL NEURALGIA OF LEFT SIDE: ICD-10-CM

## 2024-09-13 DIAGNOSIS — M47.812 CERVICAL SPONDYLOSIS: Primary | ICD-10-CM

## 2024-09-13 PROCEDURE — 99999 PR PBB SHADOW E&M-EST. PATIENT-LVL IV: CPT | Mod: PBBFAC,,, | Performed by: NURSE PRACTITIONER

## 2024-09-13 NOTE — PROGRESS NOTES
Chronic Patient Established Note         SUBJECTIVE:    Interval History 9/13/2024:  The patient returns to clinic today for follow up of pain. She is s/p right T12/L1 and L1/2 TF SANJUANA on 8/29/2024. She reports 100% relief of her back pain. She reports increased neck pain. She describes this pain as pressure. She does endorse head pain at the base that radiates forward. She also reports pain that radiates into the shoulder blade and around the rib. Her pain is worse with activity. She denies any radicular arm pain. She continues to perform a home exercise routine. She denies any other health changes. Her pain today is 8/10.    Interval History 7/23/2024:  The patient returns to clinic today for follow up of pain via virtual visit. She reports increased right shoulder pain over the few days. She was reaching down to get something off the floor. She felt something give in her shoulder. This pain felt similar to her pain before rotator cuff surgery. She did take Flexeril and 1/2 a Norco last night with benefit. The pain is much improved today. She also reports increased mid back pain over the last 2 months. She reports mid back pain that radiates into her ribs, right greater than left. She is performing home exercise. She denies any other health changes.     Interval History 4/3/2024:  Patient returns to clinic for follow up of neck and back pain. She is s/p TPI's on 2/8/24, which she reports has given her 100% percent relief for 2 months. Patient expresses that bilateral neck pain has returned + mid back pain. Patient expresses that with increased acitivity thorugh the day the muscles of the neck feel tighter. Patient expresses she continues to do home exercises + weights, and pickleball, feels like the exercises have not improved her pain.  Patient is not taking robaxin 500mg TID, she expresses taking flexeril at night as needed, and utilizes a heating pad. Patient states her current pain level is 4/10. Patient  "describes back pain has returned x 1 month, now is localized to the center of the back without radiation.     Interval History 3/5/2024:  The patient has a virtual follow up for neck and back pain. She is s/p TPIs for neck pain which she says helped 100% for that pain. Her primary complaint today is middle back pain. She had thoracic SANJUANA in the past but feels like this is slightly higher. However, she says that it is tolerable at this time. She completed PT today and says that she plans to keep up with her home exercises. Her overall pain today is 5/10.    Interval History 1/10/2024:  Rebecca Johnston Zollinger returns to clinic today s/p Right T12/L1 and L1/L2 TA SANJUANA on 12/18/2023.  She reports 85% relief that is ongoing.  She is back to walking 2 miles again.  She does find that when she exerts herself to her full extent that the pain returns slightly.  Today she continues to complain of neck pain from her previous visit and of new mid back pain.  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  She states that the pain is located bilaterally at the base of her skull and into her neck, left greater than right.  She describes the pain as pressure and it feels like "a clamp". Sometimes she has a headache by the end of the day.  The mid back pain is occasionally present in the AM and is relieve by stretching.  It is exacerbated by aerobic activity and pickleball.  The pain will wrap around right side of ribs and under her breast. .  She has not started  PT yet-she was supposed to start yesterday, but the appointment was cancelled due to weather. Denies bowel and bladder dysfunction, fever, chills, nightsweats or weight changes. She continues Robaxin Pain today is 5/10.    She denies any new weakness, denies fevers, numbness, saddle anesthesia or incontinence.     Interval History 12/15/2023:  The patient returns to clinic today for " follow up of back pain. She reports increased neck pain over the last month. She reports neck pain and pain at the base of her head, left side greater than right. This pain is worse with bending forward. She has tried Excedrin and Flexeril with limited relief. She denies any radicular arm pain. She continues to report right sided mid and low back pain. She does have radiating pain into the right groin and anterior thigh. She is scheduled for SANJUANA next week. She continues to perform a home exercise routine. She denies any other health changes. Her pain today is 8/10.     Interval History 11/21/2023:  The patient returns to clinic today for follow up of back pain via virtual visit. She is here today for imaging review. She reports worsened right sided mid and low back pain. This radiates into the right groin and anterior thigh. She denies any left leg pain. Her pain is worse with prolonged walking. She is performing a home exercise routine. She denies any other health changes.     Interval History 8/28/2023:  Rebecca Johnston Zollinger presents to the clinic for a follow-up appointment for back pain via virtual visit. She is s/p right T12/L1 TF SANJUANA on 8/14/2023. She reports 100% relief of her mid back pain. She continues to report low back pain that radiates into her groin bilaterally, right greater than left. This is worse with prolonged walking. She is no longer able to walk 4 miles, which is her typical exercise routine. She has completed multiple rounds of PT. She denies any other health changes.       Original HPI:  67 year old female who presents with Right sided lower back pain. 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 The pain is located in her R back and goes to the R hip and groin when she walks. At worst it is a 10/10, baseline is a 3/10. She describes it as aching. She used to be able to walk 4 miles, now she states  she can only walk 1 mile without pain. Last saw pain management at  2 years ago, no interventions performed. She has tried multiple rounds of PT, most recently in 2022 with some improvement, completed over 6 weeks of PT. She takes occasional ibuprofen and tylenol. Used to take tramadol and opioids but is no longer taking. She has had an MRI back in 2021 showing protruding disc at T12-L1. She denies any new weakness, denies fevers, numbness, saddle anesthesia or incontinence.     Pain Disability Index Review:      9/13/2024    10:08 AM 4/3/2024     8:09 AM 1/10/2024     7:32 AM   Last 3 PDI Scores   Pain Disability Index (PDI) 56 [phone]       Pain Medications:  None    Opioid Contract: not applicable     report:  Not applicable    Pain Procedures:   8/14/2023- Right T12/L1 TF SANJUANA  12/18/2023- Right T12/L1 and L1/2 TF SANJUNAA- 85% relief  8/29/2024- Right T12/L1 and L1/2 TF SANJUANA- 100% relief    Physical Therapy/Home Exercise: yes    Imaging:    XR CERVICAL SPINE 5 VIEW WITH FLEX AND EXT-12/15/2023  COMPARISON:  None.     FINDINGS:  Vertebral body heights are maintained.     Disc space narrowing and endplate changes C5-6 and C6-7.  Small posterior osteophytes at these levels.     Oblique views show bony narrowing of the neural foramina C5-6 on the right and C4-5 and C5-6 on the left.     Reversal of the normal cervical lordosis in the lower cervical spine.  Otherwise, AP alignment appears anatomic.     No significant prevertebral soft tissue edema.     Impression:     Degenerative change as above.       MRI Lumbar Spine 9/22/2023:  COMPARISON:  None.     FINDINGS:  Alignment: Grade 1 retrolisthesis of L5-S1.  Straightening of lumbar lordosis.     Vertebrae: Multilevel degenerative endplate changes no fracture or marrow infiltrative process.     Discs: Moderate to severe disc height loss noted throughout the lumbar spine.  No evidence for discitis.     Cord: Conus terminates at L1-L2 and appears unremarkable.  Cauda  equina appears unremarkable.     Degenerative findings:     T11-T12: Right paracentral disc extrusion results in moderate effacement of the right lateral recess and mass effect upon the right T12 nerve root.     T12-L1: Circumferential disc bulge with right paracentral disc extrusion result in mild effacement of the right lateral recess.     L1-L2: Circumferential disc bulge and mild facet arthropathy result in mild bilateral neural foraminal narrowing.     L2-L3: Circumferential disc bulge and mild facet arthropathy result in mild left neural foraminal narrowing.     L3-L4: Circumferential disc bulge and moderate facet arthropathy result in mild spinal canal stenosis and mild bilateral neural foraminal narrowing.     L4-L5: Circumferential disc bulge and mild facet arthropathy result in mild effacement of the lateral recesses and mild bilateral neural foraminal narrowing peer     L5-S1: Circumferential disc bulge and mild facet arthropathy result in moderate right, mild left neural foraminal narrowing.     Paraspinal muscles & soft tissues: Moderate paraspinal muscle atrophy.  4.0 cm left renal cyst.     Impression:     1. Multilevel degenerative changes of the lumbar and lower thoracic spine as detailed above.     Xray Hip 7/5/2023:  FINDINGS:  No acute fractures.  Some degenerative changes visualized lower lumbar spine to include endplate osteophytes and lumbosacral disc narrowing and possible vacuum phenomenon.  Intact right and left SI joints.  No definite narrowing of right or left hip joint spaces or acetabular spurring.  Preserved right and left femoral head contours.     Impression:     As above    MRI 2021:  MRI of thoracic and lumbar spine reviewed from 2021  Our interpretation: Tarlov cysts in sacral spine, Extruding disc T12-L1 R sided, disc osteophyte complex T9-T10, T10-11    Allergies: Review of patient's allergies indicates:  No Known Allergies    Current Medications:   Current Outpatient  Medications   Medication Sig Dispense Refill    alendronate (FOSAMAX) 70 MG tablet Take 70 mg by mouth every 7 days.      aspirin (ECOTRIN) 81 MG EC tablet Take 81 mg by mouth once daily.      estradioL (ESTRACE) 0.01 % (0.1 mg/gram) vaginal cream Place vaginally once daily.      FLUoxetine 40 MG capsule Take 40 mg by mouth once daily.      hydrocodone-acetaminophen (HYCET) solution 7.5-325 mg/15mL Take by mouth 4 (four) times daily as needed for Pain.      ipratropium (ATROVENT) 42 mcg (0.06 %) nasal spray 2 sprays by Each Nostril route 4 (four) times daily.      irbesartan (AVAPRO) 150 MG tablet Take 150 mg by mouth every evening.      tiZANidine (ZANAFLEX) 2 MG tablet 1 at bedtime for 3 nights then 2 every night for 3 nights then 3 every night to follow. Stay at the most comfortable dose. 90 tablet 2     No current facility-administered medications for this visit.       REVIEW OF SYSTEMS:    GENERAL:  No weight loss, malaise or fevers.  HEENT:  Negative for frequent or significant headaches.  NECK:  Negative for lumps, goiter, pain and significant neck swelling.  RESPIRATORY:  Negative for cough, wheezing or shortness of breath.  CARDIOVASCULAR:  Negative for chest pain, leg swelling or palpitations.  GI:  Negative for abdominal discomfort, blood in stools or black stools or change in bowel habits.  MUSCULOSKELETAL:  See HPI.  SKIN:  Negative for lesions, rash, and itching.  PSYCH:  Negative for sleep disturbance, mood disorder and recent psychosocial stressors.  HEMATOLOGY/LYMPHOLOGY:  Negative for prolonged bleeding, bruising easily or swollen nodes.  NEURO:   No history of headaches, syncope, paralysis, seizures or tremors.  All other reviewed and negative other than HPI.    Past Medical History:  Past Medical History:   Diagnosis Date    Anticoagulant long-term use     Hypertension        Past Surgical History:  Past Surgical History:   Procedure Laterality Date    TRANSFORAMINAL EPIDURAL INJECTION OF STEROID  Right 8/14/2023    Procedure: INJECTION, STEROID, EPIDURAL, TRANSFORAMINAL APPROACH, RIGHT T12/L1 SOONER DATE;  Surgeon: Isi Womack MD;  Location: [facility];  Service: Pain Management;  Laterality: Right;    TRANSFORAMINAL EPIDURAL INJECTION OF STEROID Right 12/18/2023    Procedure: LUMBAR TRANSFORAMINAL RIGHT T12/L1 AND L1/2;  Surgeon: Isi Womack MD;  Location: [facility];  Service: Pain Management;  Laterality: Right;  [phone]  2 WK F/U DEEPALI    TRANSFORAMINAL EPIDURAL INJECTION OF STEROID Right 8/29/2024    Procedure: THORACIC TRANSFORAMINAL RIGHT T12/L1 AND L1/2 *ASPIRIN OTC* HOLD FOR 5 DAYS;  Surgeon: Isi Womack MD;  Location: [facility];  Service: Pain Management;  Laterality: Right;  [phone]  2 WK F/U DEEPALI       Family History:  No family history on file.    Social History:  Social History     Socioeconomic History    Marital status:    Tobacco Use    Smoking status: Former     Types: Cigarettes     Passive exposure: Past    Smokeless tobacco: Never    Tobacco comments:     Quit 20 years ago   Substance and Sexual Activity    Alcohol use: Not Currently    Drug use: Never    Sexual activity: Not Currently     Social Determinants of Health     Financial Resource Strain: Low Risk  (8/6/2024)    Overall Financial Resource Strain (CARDIA)     Difficulty of Paying Living Expenses: Not hard at all   Food Insecurity: Unknown (8/6/2024)    Hunger Vital Sign     Ran Out of Food in the Last Year: Never true   Physical Activity: Sufficiently Active (8/6/2024)    Exercise Vital Sign     Days of Exercise per Week: 5 days     Minutes of Exercise per Session: 50 min   Stress: Stress Concern Present (8/6/2024)    [place] [employer] - Occupational Stress Questionnaire     Feeling of Stress : To some extent   Housing Stability: Unknown (8/6/2024)    Housing Stability Vital Sign     Unable to Pay for Housing in the Last Year: No       OBJECTIVE:    Vitals:    09/13/24 1006  24 1008   BP: 121/77    Pulse: 64    Temp: 97.4 °F (36.3 °C)    SpO2: 99%    Weight: 68.5 kg (151 lb 0.2 oz)    PainSc:   8   8   PainLoc: Neck        PHYSICAL EXAMINATION:    General appearance: Well appearing, in no acute distress, alert and oriented x3.  Psych:  Mood and affect appropriate.  Skin: Skin color, texture, turgor normal, no rashes or lesions, in both upper and lower body.  Head/face:  Atraumatic, normocephalic.    Neck: There is pain with palpation over cervical paraspinals and trapezius.Spurling Negative. Limited ROM with pain on extension and lateral rotation.    Cor: Capillary refill <3 seconds.  Pulm: Symmetric chest rise, no respiratory distress noted.   Back:  There is pain with palpation over thoracic facet joints at approximated level of T8-9 Normal ROM   Extremities: No deformities, edema, or skin discoloration.   Musculoskeletal:  Bilateral upper extremity strength is normal and symmetric.  No atrophy or tone abnormalities are noted.  Neuro:  No loss of sensation is noted.    Gait: Normal.    ASSESSMENT: 68 y.o. year old female with back and neck pain, consistent with the followin. Cervical spondylosis  MRI Cervical Spine Without Contrast    Ambulatory referral/consult to Physical/Occupational Therapy      2. Occipital neuralgia of left side        3. Thoracic radiculopathy  MRI Thoracic Spine Without Contrast      4. Pain in thoracic spine  MRI Thoracic Spine Without Contrast              PLAN:     - Previous imaging was reviewed and discussed with the patient today.    - She is s/p right T12/L1 and L1/2 TF SANJUANA with benefit.     - Obtain cervical and thoracic MRI.     - Consult physical therapy.     - Continue home exercise routine.    - Continue Flexeril.     - RTC after imaging.     The above plan and management options were discussed at length with patient. Patient is in agreement with the above and verbalized understanding.    Maggy Weeks NP  2024

## 2024-09-19 ENCOUNTER — TELEPHONE (OUTPATIENT)
Dept: PAIN MEDICINE | Facility: CLINIC | Age: 69
End: 2024-09-19
Payer: MEDICARE

## 2024-09-19 NOTE — TELEPHONE ENCOUNTER
Staff spoke with  to rely message to his wife to give her physical therapy a call to get her scheduled

## 2024-09-19 NOTE — TELEPHONE ENCOUNTER
----- Message from Angelika Ellington sent at 9/19/2024 11:30 AM CDT -----  Regarding: Therapy  Good Morning,    We have received your request to get the above mentioned patient scheduled for physical therapy.  We have been unsuccessful in reaching the patient and wanted to make you aware.  If your office speaks with the patient can you please ask that they call [phone] for scheduling.     Thank You,

## 2024-09-24 ENCOUNTER — CLINICAL SUPPORT (OUTPATIENT)
Dept: REHABILITATION | Facility: HOSPITAL | Age: 69
End: 2024-09-24
Payer: MEDICARE

## 2024-09-24 DIAGNOSIS — M53.84 DECREASED ROM OF THORACIC SPINE: ICD-10-CM

## 2024-09-24 DIAGNOSIS — M47.812 CERVICAL SPONDYLOSIS: ICD-10-CM

## 2024-09-24 DIAGNOSIS — R29.898 DECREASED ROM OF NECK: Primary | ICD-10-CM

## 2024-09-24 DIAGNOSIS — M79.18 MYOFASCIAL PAIN: ICD-10-CM

## 2024-09-24 PROCEDURE — 97161 PT EVAL LOW COMPLEX 20 MIN: CPT

## 2024-09-24 PROCEDURE — 97112 NEUROMUSCULAR REEDUCATION: CPT

## 2024-09-24 NOTE — PLAN OF CARE
[facility]   Physical Therapy Initial Evaluation      Name: Xochitl Montenegro [facility] Number: [id number]    Therapy Diagnosis:   Encounter Diagnoses   Name Primary?    Cervical spondylosis     Decreased ROM of neck Yes    Decreased ROM of thoracic spine     Myofascial pain         Physician: Maggy Weeks NP    Physician Orders: PT Eval and Treat   Medical Diagnosis from Referral: M47.812 (ICD-10-CM) - Cervical spondylosis   Evaluation Date: 9/24/2024  Authorization Period Expiration: 9/13/25  Plan of Care Expiration: 12/24/24  Progress Note Due: 10/24/24  Visit # / Visits authorized: 1/ 1   FOTO: 1/ 3    Precautions: Standard     Time In: 8:25am  Time Out: 9am  Total Appointment Time (timed & untimed codes): 35 minutes    Subjective     Date of onset: chronic    History of current condition - Xochitl reports: her neck pain started months ago after getting a Thoracic epidural. She feels tension in her upper thoracic spine that radiates into her upper neck. Now she feels tension in her occipital region that turns into a headache. She reports waking almost daily with a headache and has difficulty sleeping. She had PT at  for this issue last year with some relief. She never did try dry needling. She does a senior aerobics class 2x/week and pickleball 2x/week. She will have a MRI on Thursday. She also has a h/o lumbar and thoracic disc herniations and chronic pain. She does 20-30 min of stretching before getting out of bed. She sees the chiropractor for maintenance every 2-3 months but hasn't been recently. He also has a h/o R RTC and bicep tendon repair on R 3 years ago.     Falls: none     Imaging: none:     Prior Therapy: 1 year ago at  for neck pain   Social History:  lives with their spouse  Occupation: retired nurse   Prior Level of Function: (I)  Current Level of Function: (I)    Pain:  Current 5/10, worst 10/10, best 1/10   Location: bilateral upper trap/thoracic spine   into suboccipitals   Description: Tight, denies N/T   Aggravating Factors: reading, painting stained glass   Easing Factors:  exercises/stretching     Patients goals: reduce pain for improved sleep & reduce headaches upon waking      Medical History:   Past Medical History:   Diagnosis Date    Anticoagulant long-term use     Hypertension        Surgical History:   Rebecca Johnston Zollinger  has a past surgical history that includes Transforaminal epidural injection of steroid (Right, 8/14/2023); Transforaminal epidural injection of steroid (Right, 12/18/2023); and Transforaminal epidural injection of steroid (Right, 8/29/2024).    Medications:   Xochitl has a current medication list which includes the following prescription(s): alendronate, aspirin, estradiol, fluoxetine, hydrocodone-apap 7.5-325 mg/15 ml, ipratropium, irbesartan, and tizanidine.    Allergies:   Review of patient's allergies indicates:  No Known Allergies     Objective        POSTURE    Postural examination/scapula alignment: Rounded shoulder      NEUROLOGICAL SCREENING     Sensation: WFL BUE     Range of Motion/Strength:     CERVICAL AROM Pain/Dysfunction with Movement   Flexion 60 deg Stiffness/tightness CT junction    Extension 55 deg  Stiffness/tightness CT junction    Right rotation 35 deg   Restricted    Left rotation 35 deg  Limited by pain      Thoracolumbar AROM Pain/Dysfunction with Movement   Right rotation 50% limited  Thoracic pain    Left rotation 50% limited Thoracic pain      Shoulder Right MMT Left MMT Pain/Dysfunction with Movement (pain=!)   AROM        flexion  WFL 4-/5  WFL 4-/5            abduction  WFL 4-/5  WFL 4-/5    Internal rotation  WFL 4-/5  WFL 4-/5    ER at 0° abd  WFL 4-/5  WFL 4-/5    rhomboids  3+/5  3+/5    Mid trap  3+/5  3+/5    Lower trap   3+/5  3+/5      Elbow Right MMT Left MMT Pain/Dysfunction with Movement (pain=!)   AROM        flexion  WFL 4-/5  WFL 4-/5    extension  WFL 4-/5  WFL 4-/5      Joint  Mobility:   - Cervical: WFL      Special Tests: neg B Spurlings     Intake Outcome Measure for FOTO Neck Survey    Therapist reviewed FOTO scores for Rebecca Johnston Zollinger on 9/24/2024.   FOTO documents entered into [employer] - see Media section or FOTO account episode details.     Intake Score: 50%         Treatment     Total Treatment time (time-based codes) separate from Evaluation: 10 minutes     Xochitl received the treatments listed below:        manual therapy techniques: Joint mobilizations, Manual traction, Myofacial release, and Soft tissue Mobilization were applied to the: cervical spine for 8 minutes, including:  [x]suboccipital release       neuromuscular re-education activities to improve: Coordination, Kinesthetic, Sense, Proprioception, and Posture for 2 minutes. The following activities were included:  [x] cervical retractions     Patient Education and Home Exercises     Education provided:   - HEP    Written Home Exercises Provided: yes. Exercises were reviewed and Xochitl was able to demonstrate them prior to the end of the session.  Xochitl demonstrated good  understanding of the education provided. See EMR under Patient Instructions for exercises provided during therapy sessions.    Assessment     Xochitl is a 68 y.o. female referred to outpatient Physical Therapy with a medical diagnosis of cervical spondylosis. Patient presents with impaired and painful cervical range of motion and hypertonicity of cervico-thoracic paraspinals and headaches affecting her sleep and functional mobility.     Patient prognosis is Fair.   Patient will benefit from skilled outpatient Physical Therapy to address the deficits stated above and in the chart below, provide patient /family education, and to maximize patientt's level of independence.     Plan of care discussed with patient: Yes  Patient's spiritual, cultural and educational needs considered and patient is agreeable to the plan of care and goals as stated  below:     Anticipated Barriers for therapy: chronicity of pain, comorbidities   Medical Necessity is demonstrated by the following  History  Co-morbidities and personal factors that may impact the plan of care [] LOW: no personal factors / co-morbidities  [x] MODERATE: 1-2 personal factors / co-morbidities  [] HIGH: 3+ personal factors / co-morbidities    Moderate / High Support Documentation:   Co-morbidities affecting plan of care: OA, h/o R shoulder surgery    Personal Factors:   age     Examination  Body Structures and Functions, activity limitations and participation restrictions that may impact the plan of care [] LOW: addressing 1-2 elements  [x] MODERATE: 3+ elements  [] HIGH: 4+ elements (please support below)    Moderate / High Support Documentation: impacts ability to sleep, perform household chores, paint for hobby, and participate in exercise      Clinical Presentation [x] LOW: stable  [] MODERATE: Evolving  [] HIGH: Unstable     Decision Making/ Complexity Score: low       Goals:  Short Term Goals: 6 visits  1. Pt will be (I) /c HEP to supplement PT in order to improve functional tasks  2. Pt will improve B cervical rotation range of motion >/= 45 deg for safety with driving  3. Pt will report reduction in morning headaches </= 4 days/week     Long Term Goals: 12 visits   1. Pt will improve B lawson-scaps MMTs to 4-/5 to improve strength for functional activities  2. Pt will improve FOTO score to </= 55% to reduce perceived pain with mobility   3. Pt will report improved ability to sleep without pain >/= 4 nights/week   Plan     Plan of care Certification: 9/24/2024 to 12/24/24.    Outpatient Physical Therapy 1-2 times weekly for 12 visits to include the following interventions: Manual Therapy, Moist Heat/ Ice, Neuromuscular Re-ed, Patient Education, Self Care, Therapeutic Activities, and Therapeutic Exercise, E-stim and Dry Needling as needed.     Benita Contreras, PT

## 2024-09-25 ENCOUNTER — OFFICE VISIT (OUTPATIENT)
Dept: SPORTS MEDICINE | Facility: CLINIC | Age: 69
End: 2024-09-25
Payer: MEDICARE

## 2024-09-25 VITALS
BODY MASS INDEX: 26.25 KG/M2 | WEIGHT: 153.75 LBS | HEIGHT: 64 IN | SYSTOLIC BLOOD PRESSURE: 101 MMHG | HEART RATE: 69 BPM | DIASTOLIC BLOOD PRESSURE: 66 MMHG

## 2024-09-25 DIAGNOSIS — M19.011 OSTEOARTHRITIS OF RIGHT GLENOHUMERAL JOINT: Primary | ICD-10-CM

## 2024-09-25 PROCEDURE — 1159F MED LIST DOCD IN RCRD: CPT | Mod: CPTII,S$GLB,, | Performed by: PHYSICIAN ASSISTANT

## 2024-09-25 PROCEDURE — 99999 PR PBB SHADOW E&M-EST. PATIENT-LVL III: CPT | Mod: PBBFAC,,, | Performed by: PHYSICIAN ASSISTANT

## 2024-09-25 PROCEDURE — 3288F FALL RISK ASSESSMENT DOCD: CPT | Mod: CPTII,S$GLB,, | Performed by: PHYSICIAN ASSISTANT

## 2024-09-25 PROCEDURE — 3074F SYST BP LT 130 MM HG: CPT | Mod: CPTII,S$GLB,, | Performed by: PHYSICIAN ASSISTANT

## 2024-09-25 PROCEDURE — 4010F ACE/ARB THERAPY RXD/TAKEN: CPT | Mod: CPTII,S$GLB,, | Performed by: PHYSICIAN ASSISTANT

## 2024-09-25 PROCEDURE — 1126F AMNT PAIN NOTED NONE PRSNT: CPT | Mod: CPTII,S$GLB,, | Performed by: PHYSICIAN ASSISTANT

## 2024-09-25 PROCEDURE — 1160F RVW MEDS BY RX/DR IN RCRD: CPT | Mod: CPTII,S$GLB,, | Performed by: PHYSICIAN ASSISTANT

## 2024-09-25 PROCEDURE — 99213 OFFICE O/P EST LOW 20 MIN: CPT | Mod: S$GLB,,, | Performed by: PHYSICIAN ASSISTANT

## 2024-09-25 PROCEDURE — 3008F BODY MASS INDEX DOCD: CPT | Mod: CPTII,S$GLB,, | Performed by: PHYSICIAN ASSISTANT

## 2024-09-25 PROCEDURE — 1101F PT FALLS ASSESS-DOCD LE1/YR: CPT | Mod: CPTII,S$GLB,, | Performed by: PHYSICIAN ASSISTANT

## 2024-09-25 PROCEDURE — 3078F DIAST BP <80 MM HG: CPT | Mod: CPTII,S$GLB,, | Performed by: PHYSICIAN ASSISTANT

## 2024-09-25 NOTE — PROGRESS NOTES
ESTABLISHED PATIENT    History 9/25/2024:  Xochitl returns here today for follow-up evaluation of her right shoulder.  She was given an intra-articular CSI at her last visit and has received significant improvement in her pain, range of motion, and function.  She states that she is at least 95% improved.  She has also noticed a significant improvement in the frequency of her catching sensations.    History 8/7/2024:  68 y.o. Female with a history of right shoulder pain who complains today of having progressive pain and functional limitations over the last year.  She previously underwent an arthroscopic rotator cuff repair in 2018.  She did very well for the 1st few years but began having intermittent discomfort.  Her symptoms have now become more constant and occurs daily.  She reports having an occasional sharp, stabbing pain depending on certain positions of her arm.  She denies having any specific stiffness or weakness.        + mechanical symptoms, - instability    Is affecting ADLs.  Pain is 10/10 at it's worst.        PAST MEDICAL HISTORY    Past Medical History:   Diagnosis Date    Anticoagulant long-term use     Hypertension        PAST SURGICAL HISTORY     Past Surgical History:   Procedure Laterality Date    TRANSFORAMINAL EPIDURAL INJECTION OF STEROID Right 8/14/2023    Procedure: INJECTION, STEROID, EPIDURAL, TRANSFORAMINAL APPROACH, RIGHT T12/L1 SOONER DATE;  Surgeon: Isi Womack MD;  Location: [facility];  Service: Pain Management;  Laterality: Right;    TRANSFORAMINAL EPIDURAL INJECTION OF STEROID Right 12/18/2023    Procedure: LUMBAR TRANSFORAMINAL RIGHT T12/L1 AND L1/2;  Surgeon: Isi Womack MD;  Location: [facility];  Service: Pain Management;  Laterality: Right;  [phone]  2 WK F/U DEEPALI    TRANSFORAMINAL EPIDURAL INJECTION OF STEROID Right 8/29/2024    Procedure: THORACIC TRANSFORAMINAL RIGHT T12/L1 AND L1/2 *ASPIRIN OTC* HOLD FOR 5 DAYS;  Surgeon: Isi Womack MD;  Location: [facility]  PAIN MGT;  Service: Pain Management;  Laterality: Right;  [phone]  2 WK F/U DEEPALI       FAMILY HISTORY    No family history on file.    SOCIAL HISTORY    Social History     Socioeconomic History    Marital status:    Tobacco Use    Smoking status: Former     Types: Cigarettes     Passive exposure: Past    Smokeless tobacco: Never    Tobacco comments:     Quit 20 years ago   Substance and Sexual Activity    Alcohol use: Not Currently    Drug use: Never    Sexual activity: Not Currently     Social Determinants of Health     Financial Resource Strain: Low Risk  (8/6/2024)    Overall Financial Resource Strain (CARDIA)     Difficulty of Paying Living Expenses: Not hard at all   Food Insecurity: Unknown (8/6/2024)    Hunger Vital Sign     Ran Out of Food in the Last Year: Never true   Physical Activity: Sufficiently Active (8/6/2024)    Exercise Vital Sign     Days of Exercise per Week: 5 days     Minutes of Exercise per Session: 50 min   Stress: Stress Concern Present (8/6/2024)    [employer] - Occupational Stress Questionnaire     Feeling of Stress : To some extent   Housing Stability: Unknown (8/6/2024)    Housing Stability Vital Sign     Unable to Pay for Housing in the Last Year: No       MEDICATIONS      Current Outpatient Medications:     alendronate (FOSAMAX) 70 MG tablet, Take 70 mg by mouth every 7 days., Disp: , Rfl:     aspirin (ECOTRIN) 81 MG EC tablet, Take 81 mg by mouth once daily., Disp: , Rfl:     estradioL (ESTRACE) 0.01 % (0.1 mg/gram) vaginal cream, Place vaginally once daily., Disp: , Rfl:     FLUoxetine 40 MG capsule, Take 40 mg by mouth once daily., Disp: , Rfl:     hydrocodone-acetaminophen (HYCET) solution 7.5-325 mg/15mL, Take by mouth 4 (four) times daily as needed for Pain., Disp: , Rfl:     ipratropium (ATROVENT) 42 mcg (0.06 %) nasal spray, 2 sprays by Each Nostril route 4 (four) times daily., Disp: , Rfl:     irbesartan (AVAPRO) 150 MG tablet, Take  "150 mg by mouth every evening., Disp: , Rfl:     tiZANidine (ZANAFLEX) 2 MG tablet, 1 at bedtime for 3 nights then 2 every night for 3 nights then 3 every night to follow. Stay at the most comfortable dose., Disp: 90 tablet, Rfl: 2    ALLERGIES     Review of patient's allergies indicates:  No Known Allergies      REVIEW OF SYSTEMS   Constitution: Negative. Negative for chills, fever and night sweats.   HENT: Negative for congestion and headaches.    Eyes: Negative for blurred vision, left vision loss and right vision loss.   Cardiovascular: Negative for chest pain and syncope.   Respiratory: Negative for cough and shortness of breath.    Endocrine: Negative for polydipsia, polyphagia and polyuria.   Hematologic/Lymphatic: Negative for bleeding problem. Does not bruise/bleed easily.   Skin: Negative for dry skin, itching and rash.   Musculoskeletal: Negative for falls. Positive for right shoulder pain.  Gastrointestinal: Negative for abdominal pain and bowel incontinence.   Genitourinary: Negative for bladder incontinence and nocturia.   Neurological: Negative for disturbances in coordination, loss of balance and seizures.   Psychiatric/Behavioral: Negative for depression. The patient does not have insomnia.    Allergic/Immunologic: Negative for hives and persistent infections.     PHYSICAL EXAMINATION    Vitals: /66   Pulse 69   Ht 5' 4" (1.626 m)   Wt 69.7 kg (153 lb 12.3 oz)   BMI 26.39 kg/m²     General: The patient appears active and healthy with no apparent physical problems.  No disturbance of mood or affect is demonstrated. Alert and Oriented.    HEENT: Eyes normal, pupils equally round, nose normal.    Resp: Equal and symmetrical chest rises. No wheezing    CV: Regular rate    Neck: Supple; nonpainful range of motion.    Extremities: no cyanosis, clubbing, edema, or diffuse swelling.  Palpable pulses, good capillary refill of the digits.  No coolness, discoloration, edema or obvious " varicosities.    Skin: no lesions noted.    Lymphatic: no detected adenopathy in the upper or lower extremities.    Neurologic: normal mental status, normal reflexes, normal gait and balance.  Patient is alert and oriented to person, place and time.  No flaccidity or spasticity is noted.  No motor or sensory deficits are noted.  Light touch is intact    Orthopaedic: SHOULDER EXAM - RIGHT    Inspection:   Normal skin color and appearance with no scars.  No muscle atrophy noted.  No scapular winging.      Palpation: No tenderness of the acromioclavicular joint, lateral edge of the acromion, biceps tendon, trapezius muscle or scapulothoracic bursa.      ROM:      PROM:     FE - 170°    Abd/ER -  70°  Abd/IR -  30°   Add/ER -  30°     AROM:    FE - 160°    Abd/ER -  60°  Abd/IR -  15°   Add/ER -  30°         Tests:     - Haley, - Neer's, - Cross Arm Adduction, - High View, - Yerguson, - Speed. - Belly Press,  - Jobes, - Lift Off    Stability: - sulcus, - apprehension, - relocation, - load and shift, - DLS      Motor:  Rotator cuff strength is 5/5 supraspinatus, 5/5 infraspinatus, 5/5 subscapularis. Biceps, triceps and deltoid strength is 5/5.      Neuro     Distally there are no paresthesias, and sensation is intact to light touch in the median, ulnar, and radial distributions.  Reflexes are 2/2 biceps, triceps and brachioradialis.        IMAGING      X-ray Shoulder 2 or More Views Right  Order: [id number]  Status: Final result       Visible to patient: Yes (seen)       Next appt: 09/26/2024 at 11:00 AM in Radiology ([facility])       Dx: Pain    0 Result Notes  Details    Reading Physician Reading Date Result Priority   Tye Aguilar MD  [phone]  [phone] 8/7/2024 Routine     Narrative & Impression  EXAMINATION:  XR SHOULDER COMPLETE 2 OR MORE VIEWS RIGHT     TECHNIQUE:  Four views of the right shoulder were obtained.     COMPARISON:  No relevant comparison examinations are currently available.      FINDINGS:  An advanced degree of right glenohumeral degenerative arthritic change is observed, including marked glenohumeral joint space narrowing, glenoid spurring, prominent spurring involving the inferomedial aspect of the right humeral head, and subchondral cystic change in the right humeral head and glenoid all observed.  There is a separate calcific/ossific opacity in the soft tissues adjacent to the inferomedial humeral spur.  No evidence of recent fracture or lytic destructive process.  No glenohumeral dislocation.     Impression:     Severe right glenohumeral degenerative arthritic changes, as discussed above.        Electronically signed by:Tye Aguilar MD  Date:                                            08/07/2024  Time:                                           11:45           Exam Ended: 08/07/24 11:40 CDT Last Resulted: 08/07/24 11:45 CDT               IMPRESSION       ICD-10-CM ICD-9-CM   1. Osteoarthritis of right glenohumeral joint  M19.011 715.91         MEDICATIONS PRESCRIBED      None    RECOMMENDATIONS     Surgical and conservative treatment options for advanced glenohumeral joint osteoarthritis were discussed in depth today  She responded very well to the CSI and would like to continue with conservative treatment; can repeat injection in 3 months if needed  Continue activity modifications  Encouraged frequent icing, NSAIDs, and oral Tylenol as needed for pain  Return to clinic as needed       Procedures        All of their questions were answered.  They will call the clinic with any questions or concerns in the interim.     Should the patient's symptoms worsen, persist, or fail to improve they should return for reevaluation and I would be happy to see them back anytime.                  Ronald [facility] PA-C       Please be aware that this note has been generated with the assistance of [employer] voice-to-text.  Please excuse any spelling or grammatical errors.     Thank you for choosing [facility] for your sports medicine care. It is our goal to provide you with exceptional care that will help keep you healthy, active, and get you back in the game.     If you felt that you received exemplary care today, please consider leaving feedback for Mr. Jose Roberto PA-C on [employer] at [email or web address]       Please do not hesitate to reach out to us via email, phone, or MyChart with any questions, concerns, or feedback.

## 2024-09-26 ENCOUNTER — HOSPITAL ENCOUNTER (OUTPATIENT)
Dept: RADIOLOGY | Facility: OTHER | Age: 69
Discharge: HOME | End: 2024-09-26
Attending: NURSE PRACTITIONER
Payer: MEDICARE

## 2024-09-26 DIAGNOSIS — M54.14 THORACIC RADICULOPATHY: ICD-10-CM

## 2024-09-26 DIAGNOSIS — M47.812 CERVICAL SPONDYLOSIS: ICD-10-CM

## 2024-09-26 DIAGNOSIS — M54.6 PAIN IN THORACIC SPINE: ICD-10-CM

## 2024-09-26 PROCEDURE — 72141 MRI NECK SPINE W/O DYE: CPT | Mod: TC

## 2024-09-26 PROCEDURE — 72146 MRI CHEST SPINE W/O DYE: CPT | Mod: TC

## 2024-09-26 PROCEDURE — 72141 MRI NECK SPINE W/O DYE: CPT | Mod: 26,,, | Performed by: RADIOLOGY

## 2024-09-26 PROCEDURE — 72146 MRI CHEST SPINE W/O DYE: CPT | Mod: 26,,, | Performed by: RADIOLOGY

## 2024-10-01 ENCOUNTER — CLINICAL SUPPORT (OUTPATIENT)
Dept: REHABILITATION | Facility: HOSPITAL | Age: 69
End: 2024-10-01
Payer: MEDICARE

## 2024-10-01 ENCOUNTER — TELEPHONE (OUTPATIENT)
Dept: SPINE | Facility: CLINIC | Age: 69
End: 2024-10-01
Payer: MEDICARE

## 2024-10-01 DIAGNOSIS — M47.812 CERVICAL SPONDYLOSIS: Primary | ICD-10-CM

## 2024-10-01 DIAGNOSIS — M79.18 MYOFASCIAL PAIN: ICD-10-CM

## 2024-10-01 DIAGNOSIS — R29.898 DECREASED ROM OF NECK: ICD-10-CM

## 2024-10-01 DIAGNOSIS — M53.84 DECREASED ROM OF THORACIC SPINE: ICD-10-CM

## 2024-10-01 PROCEDURE — 97112 NEUROMUSCULAR REEDUCATION: CPT | Performed by: PHYSICAL THERAPIST

## 2024-10-01 PROCEDURE — 97140 MANUAL THERAPY 1/> REGIONS: CPT | Performed by: PHYSICAL THERAPIST

## 2024-10-01 NOTE — PROGRESS NOTES
[facility]   Physical Therapy Treatment Note        Name: Xochitl Montenegro [facility] Number: [id number]    Therapy Diagnosis:   Encounter Diagnoses   Name Primary?    Cervical spondylosis Yes    Decreased ROM of neck     Decreased ROM of thoracic spine     Myofascial pain      Physician: Maggy Weeks NP    Visit Date: 10/1/2024    Physician Orders: PT Eval and Treat   Medical Diagnosis from Referral: M47.812 (ICD-10-CM) - Cervical spondylosis   Evaluation Date: 9/24/2024  Authorization Period Expiration: 9/13/25  Plan of Care Expiration: 12/24/24  Progress Note Due: 10/24/24  Visit # / Visits authorized: 1/ 1   FOTO: 1/ 3     Precautions: Standard     PTA Visit #: 0/5   Date of last FOTO:9/24/24    Time In: 8:10am  Time Out: 8:52am  Total Billable Time: 42 minutes    SUBJECTIVE     Pt reports: she saw a chiropractor who used a strong TENS on her neck and she felt too tense afterwards. She did get relief from suboccipital release with PT last session and did not have a headache that night.  She was compliant with home exercise program.  Response to previous treatment: reduced neck tension & no headache   Functional change: none    Pain: 3/10  Location: right upper trap tightness     OBJECTIVE     Objective Measures updated at progress report unless specified.     Treatment     Xochitl received the treatments listed below:  (new treatments are indicated in bold)    therapeutic exercises to develop strength, endurance, ROM, and flexibility for 5 minutes including:    [x] UBE 2'fwd/2'bkwd   []    manual therapy techniques: Joint mobilizations, Manual traction, Myofacial release, and Soft tissue Mobilization were applied to the: cervical spine for 25  minutes, including:  [x]suboccipital release   [x] PA C3-6 joint mobs in supine  [x] manual cervical traction   [x] STM R upper trap and cervical paraspinals in L sidelying         neuromuscular re-education activities to improve:  Coordination, Kinesthetic, Sense, Proprioception, and Posture for 12 minutes. The following activities were included:  [x] cervical retractions x10   [x] cervical retractions with YTB x10  [x] open books on wall x10 B     therapeutic activities to improve functional performance for 0  minutes, including:    []  []      Patient Education and Home Exercises     Home Exercises Provided and Patient Education Provided     Education provided:   - HEP    Written Home Exercises Provided: Patient instructed to cont prior HEP. Exercises were reviewed and Xochitl was able to demonstrate them prior to the end of the session.  Xochitl demonstrated good  understanding of the education provided. See EMR under Patient Instructions for exercises provided during therapy sessions    ASSESSMENT     Pt tolerated session well. She presented with hypertonicity of R upper trap, addressed with manual treatments. Added thoracic mobility exercises for reduced pain with functional activities. Pt reported feeling better after session, progress as tolerated.     Xochitl Is progressing well towards her goals.   Pt prognosis is Fair.     Pt will continue to benefit from skilled outpatient physical therapy to address the deficits listed in the problem list box on initial evaluation, provide pt/family education and to maximize pt's level of independence in the home and community environment.     Pt's spiritual, cultural and educational needs considered and pt agreeable to plan of care and goals.     Anticipated barriers to physical therapy: chronicity of pain, comorbidities     Goals: Short Term Goals: 6 visits  1. Pt will be (I) /c HEP to supplement PT in order to improve functional tasks  2. Pt will improve B cervical rotation range of motion >/= 45 deg for safety with driving  3. Pt will report reduction in morning headaches </= 4 days/week      Long Term Goals: 12 visits   1. Pt will improve B lawson-scaps MMTs to 4-/5 to improve strength for  functional activities  2. Pt will improve FOTO score to </= 55% to reduce perceived pain with mobility   3. Pt will report improved ability to sleep without pain >/= 4 nights/week     PLAN     Plan of care Certification: 9/24/2024 to 12/24/24.     Outpatient Physical Therapy 1-2 times weekly for 12 visits to include the following interventions: Manual Therapy, Moist Heat/ Ice, Neuromuscular Re-ed, Patient Education, Self Care, Therapeutic Activities, and Therapeutic Exercise, E-stim and Dry Needling as needed.     Benita Contreras, PT

## 2024-10-02 ENCOUNTER — OFFICE VISIT (OUTPATIENT)
Dept: SPINE | Facility: CLINIC | Age: 69
End: 2024-10-02
Payer: MEDICARE

## 2024-10-02 VITALS
HEART RATE: 64 BPM | BODY MASS INDEX: 26.24 KG/M2 | DIASTOLIC BLOOD PRESSURE: 74 MMHG | HEIGHT: 64 IN | WEIGHT: 153.69 LBS | OXYGEN SATURATION: 100 % | RESPIRATION RATE: 18 BRPM | SYSTOLIC BLOOD PRESSURE: 156 MMHG

## 2024-10-02 DIAGNOSIS — M53.84 DECREASED ROM OF THORACIC SPINE: ICD-10-CM

## 2024-10-02 DIAGNOSIS — M54.12 CERVICAL RADICULOPATHY: Primary | ICD-10-CM

## 2024-10-02 DIAGNOSIS — M47.812 CERVICAL SPONDYLOSIS: ICD-10-CM

## 2024-10-02 PROCEDURE — 1160F RVW MEDS BY RX/DR IN RCRD: CPT | Mod: CPTII,S$GLB,, | Performed by: NURSE PRACTITIONER

## 2024-10-02 PROCEDURE — 99214 OFFICE O/P EST MOD 30 MIN: CPT | Mod: S$GLB,,, | Performed by: NURSE PRACTITIONER

## 2024-10-02 PROCEDURE — 3077F SYST BP >= 140 MM HG: CPT | Mod: CPTII,S$GLB,, | Performed by: NURSE PRACTITIONER

## 2024-10-02 PROCEDURE — 3288F FALL RISK ASSESSMENT DOCD: CPT | Mod: CPTII,S$GLB,, | Performed by: NURSE PRACTITIONER

## 2024-10-02 PROCEDURE — 4010F ACE/ARB THERAPY RXD/TAKEN: CPT | Mod: CPTII,S$GLB,, | Performed by: NURSE PRACTITIONER

## 2024-10-02 PROCEDURE — 3078F DIAST BP <80 MM HG: CPT | Mod: CPTII,S$GLB,, | Performed by: NURSE PRACTITIONER

## 2024-10-02 PROCEDURE — 3008F BODY MASS INDEX DOCD: CPT | Mod: CPTII,S$GLB,, | Performed by: NURSE PRACTITIONER

## 2024-10-02 PROCEDURE — 1125F AMNT PAIN NOTED PAIN PRSNT: CPT | Mod: CPTII,S$GLB,, | Performed by: NURSE PRACTITIONER

## 2024-10-02 PROCEDURE — 1101F PT FALLS ASSESS-DOCD LE1/YR: CPT | Mod: CPTII,S$GLB,, | Performed by: NURSE PRACTITIONER

## 2024-10-02 PROCEDURE — 99999 PR PBB SHADOW E&M-EST. PATIENT-LVL III: CPT | Mod: PBBFAC,,, | Performed by: NURSE PRACTITIONER

## 2024-10-02 PROCEDURE — 1159F MED LIST DOCD IN RCRD: CPT | Mod: CPTII,S$GLB,, | Performed by: NURSE PRACTITIONER

## 2024-10-02 NOTE — H&P (VIEW-ONLY)
Chronic Patient Established Note         SUBJECTIVE:    Interval History 10/2/2024:  The patient returns to clinic today for follow up of neck and mid back pain. She is here today for imaging review. She continues to report neck pain that radiates into the shoulder. She also notes headaches. She denies any radicular arm pain. Her pain is worse with turning her head and activity. She is performing a home exercise routine. She denies any other health changes. Her pain today is 2/10.     Interval History 9/13/2024:  The patient returns to clinic today for follow up of pain. She is s/p right T12/L1 and L1/2 TF SANJUANA on 8/29/2024. She reports 100% relief of her back pain. She reports increased neck pain. She describes this pain as pressure. She does endorse head pain at the base that radiates forward. She also reports pain that radiates into the shoulder blade and around the rib. Her pain is worse with activity. She denies any radicular arm pain. She continues to perform a home exercise routine. She denies any other health changes. Her pain today is 8/10.    Interval History 7/23/2024:  The patient returns to clinic today for follow up of pain via virtual visit. She reports increased right shoulder pain over the few days. She was reaching down to get something off the floor. She felt something give in her shoulder. This pain felt similar to her pain before rotator cuff surgery. She did take Flexeril and 1/2 a Norco last night with benefit. The pain is much improved today. She also reports increased mid back pain over the last 2 months. She reports mid back pain that radiates into her ribs, right greater than left. She is performing home exercise. She denies any other health changes.     Interval History 4/3/2024:  Patient returns to clinic for follow up of neck and back pain. She is s/p TPI's on 2/8/24, which she reports has given her 100% percent relief for 2 months. Patient expresses that bilateral neck pain has returned  "+ mid back pain. Patient expresses that with increased acitivity thorugh the day the muscles of the neck feel tighter. Patient expresses she continues to do home exercises + weights, and pickleball, feels like the exercises have not improved her pain.  Patient is not taking robaxin 500mg TID, she expresses taking flexeril at night as needed, and utilizes a heating pad. Patient states her current pain level is 4/10. Patient describes back pain has returned x 1 month, now is localized to the center of the back without radiation.     Interval History 3/5/2024:  The patient has a virtual follow up for neck and back pain. She is s/p TPIs for neck pain which she says helped 100% for that pain. Her primary complaint today is middle back pain. She had thoracic SANJUANA in the past but feels like this is slightly higher. However, she says that it is tolerable at this time. She completed PT today and says that she plans to keep up with her home exercises. Her overall pain today is 5/10.    Interval History 1/10/2024:  Rebecca Johnston Zollinger returns to clinic today s/p Right T12/L1 and L1/L2 TA SANJUANA on 12/18/2023.  She reports 85% relief that is ongoing.  She is back to walking 2 miles again.  She does find that when she exerts herself to her full extent that the pain returns slightly.  Today she continues to complain of neck pain from her previous visit and of new mid back pain.  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  She states that the pain is located bilaterally at the base of her skull and into her neck, left greater than right.  She describes the pain as pressure and it feels like "a clamp". Sometimes she has a headache by the end of the day.  The mid back pain is occasionally present in the AM and is relieve by stretching.  It is exacerbated by aerobic activity and pickleball.  The pain will wrap around right side of ribs and " under her breast. .  She has not started  PT yet-she was supposed to start yesterday, but the appointment was cancelled due to weather. Denies bowel and bladder dysfunction, fever, chills, nightsweats or weight changes. She continues Robaxin Pain today is 5/10.    She denies any new weakness, denies fevers, numbness, saddle anesthesia or incontinence.     Interval History 12/15/2023:  The patient returns to clinic today for follow up of back pain. She reports increased neck pain over the last month. She reports neck pain and pain at the base of her head, left side greater than right. This pain is worse with bending forward. She has tried Excedrin and Flexeril with limited relief. She denies any radicular arm pain. She continues to report right sided mid and low back pain. She does have radiating pain into the right groin and anterior thigh. She is scheduled for SANJUANA next week. She continues to perform a home exercise routine. She denies any other health changes. Her pain today is 8/10.     Interval History 11/21/2023:  The patient returns to clinic today for follow up of back pain via virtual visit. She is here today for imaging review. She reports worsened right sided mid and low back pain. This radiates into the right groin and anterior thigh. She denies any left leg pain. Her pain is worse with prolonged walking. She is performing a home exercise routine. She denies any other health changes.     Interval History 8/28/2023:  Rebecca Johnston Zollinger presents to the clinic for a follow-up appointment for back pain via virtual visit. She is s/p right T12/L1 TF SANJUANA on 8/14/2023. She reports 100% relief of her mid back pain. She continues to report low back pain that radiates into her groin bilaterally, right greater than left. This is worse with prolonged walking. She is no longer able to walk 4 miles, which is her typical exercise routine. She has completed multiple rounds of PT. She denies any other health  changes.       Original HPI:  67 year old female who presents with Right sided lower back pain. 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 The pain is located in her R back and goes to the R hip and groin when she walks. At worst it is a 10/10, baseline is a 3/10. She describes it as aching. She used to be able to walk 4 miles, now she states she can only walk 1 mile without pain. Last saw pain management at  2 years ago, no interventions performed. She has tried multiple rounds of PT, most recently in 2022 with some improvement, completed over 6 weeks of PT. She takes occasional ibuprofen and tylenol. Used to take tramadol and opioids but is no longer taking. She has had an MRI back in 2021 showing protruding disc at T12-L1. She denies any new weakness, denies fevers, numbness, saddle anesthesia or incontinence.     Pain Disability Index Review:      9/13/2024    10:08 AM 4/3/2024     8:09 AM 1/10/2024     7:32 AM   Last 3 PDI Scores   Pain Disability Index (PDI) 56 [phone]       Pain Medications:  None    Opioid Contract: not applicable     report:  Not applicable    Pain Procedures:   8/14/2023- Right T12/L1 TF SANJUANA  12/18/2023- Right T12/L1 and L1/2 TF SANJUANA- 85% relief  8/29/2024- Right T12/L1 and L1/2 TF SANJUANA- 100% relief    Physical Therapy/Home Exercise: yes    Imaging:  MRI Cervical Spine 9/26/2024:  COMPARISON:  12/15/2023     FINDINGS:  Alignment: Normal.     Vertebrae: Normal marrow signal. No fracture.     Discs: Disc space narrowing present C5-C6-C7 with marginal osteophytosis     Cord: Normal.     Skull base and craniocervical junction: Normal.     Degenerative findings:     C2-C3: There is no focal disc herniation.No significant central canal narrowing.  No significant neural foraminal narrowing.     C3-C4: There is no focal disc herniation.No significant central canal narrowing.  Mild LEFT neural foraminal  narrowing.     C4-C5: There is no focal disc herniation.No significant central canal narrowing.  Moderate LEFT neural foraminal narrowing.     C5-C6: Posterior marginal osteophytic disc spur complex.There is no focal disc herniation.No significant central canal narrowing.  Moderate-severe RIGHT mild LEFT neural foraminal narrowing.     C6-C7: Posterior marginal osteophytic disc spur complex.There is no focal disc herniation.No significant central canal narrowing.  Moderate-severe LEFT mild RIGHT neural foraminal narrowing.     C7-T1: There is no focal disc herniation.No significant central canal narrowing.  No significant neural foraminal narrowing.     Paraspinal muscles & soft tissues: Multinodular goiter with dominant nodule RIGHT thyroid lobe, hyperintense measuring 2.1 cm.  Thyroid ultrasound recommended..     Impression:     Multilevel lumbar spondylosis most evident C5-C7 disc space levels with marginal osteophytosis and uncovertebral hypertrophy resulting in associated neural foraminal encroachment as detailed above.     Multiple thyroid nodules largest of which of RIGHT thyroid lobe 2.1 cm.  Routine thyroid ultrasound recommended for further evaluation.    MRI Thoracic Spine 9/26/2024:  COMPARISON:  None.     FINDINGS:  Alignment: The thoracic spine demonstrates proper alignment.     Vertebra: The vertebral bodies show normal signal intensity and height with no indication of acute fracture or pathologic marrow replacement process.  Suspect incidental hemangioma T10 with cortical endplate degenerative changes most evident T7-T8 through T9-T10     Disc: Multilevel disc space narrowing and desiccation with posterior marginal osteophytic spurring.     Cord: The demonstrated portion of the spinal cord is normal in signal intensity at all levels with no indication of myelomalacia or cord edema.     Evaluation of the surrounding soft tissue structures demonstrates no acute abnormality.  As noted in the cervical  portion of the report, multiple nodules within the thyroid for which routine thyroid ultrasound recommended.     Degenerative findings: There is a extradural soft tissue density at the T12 level lateralizing RIGHT with mass effect upon the thecal sac.  This is associated with a RIGHT foraminal disc from the T12-L1 level.  These findings are identified on series 7 image 55 and series 7, image 53, confirmed on sagittal series 4, image 8. there is associated mass effect upon the thecal sac yet no evidence for cord deviation.     Impression:     RIGHT foraminal disc protrusion T12-L1 with extruded disc material RIGHT extradural T12 level      XR CERVICAL SPINE 5 VIEW WITH FLEX AND EXT-12/15/2023  COMPARISON:  None.     FINDINGS:  Vertebral body heights are maintained.     Disc space narrowing and endplate changes C5-6 and C6-7.  Small posterior osteophytes at these levels.     Oblique views show bony narrowing of the neural foramina C5-6 on the right and C4-5 and C5-6 on the left.     Reversal of the normal cervical lordosis in the lower cervical spine.  Otherwise, AP alignment appears anatomic.     No significant prevertebral soft tissue edema.     Impression:     Degenerative change as above.       MRI Lumbar Spine 9/22/2023:  COMPARISON:  None.     FINDINGS:  Alignment: Grade 1 retrolisthesis of L5-S1.  Straightening of lumbar lordosis.     Vertebrae: Multilevel degenerative endplate changes no fracture or marrow infiltrative process.     Discs: Moderate to severe disc height loss noted throughout the lumbar spine.  No evidence for discitis.     Cord: Conus terminates at L1-L2 and appears unremarkable.  Cauda equina appears unremarkable.     Degenerative findings:     T11-T12: Right paracentral disc extrusion results in moderate effacement of the right lateral recess and mass effect upon the right T12 nerve root.     T12-L1: Circumferential disc bulge with right paracentral disc extrusion result in mild effacement of  the right lateral recess.     L1-L2: Circumferential disc bulge and mild facet arthropathy result in mild bilateral neural foraminal narrowing.     L2-L3: Circumferential disc bulge and mild facet arthropathy result in mild left neural foraminal narrowing.     L3-L4: Circumferential disc bulge and moderate facet arthropathy result in mild spinal canal stenosis and mild bilateral neural foraminal narrowing.     L4-L5: Circumferential disc bulge and mild facet arthropathy result in mild effacement of the lateral recesses and mild bilateral neural foraminal narrowing peer     L5-S1: Circumferential disc bulge and mild facet arthropathy result in moderate right, mild left neural foraminal narrowing.     Paraspinal muscles & soft tissues: Moderate paraspinal muscle atrophy.  4.0 cm left renal cyst.     Impression:     1. Multilevel degenerative changes of the lumbar and lower thoracic spine as detailed above.     Xray Hip 7/5/2023:  FINDINGS:  No acute fractures.  Some degenerative changes visualized lower lumbar spine to include endplate osteophytes and lumbosacral disc narrowing and possible vacuum phenomenon.  Intact right and left SI joints.  No definite narrowing of right or left hip joint spaces or acetabular spurring.  Preserved right and left femoral head contours.     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      aspirin (ECOTRIN) 81 MG EC tablet Take 81 mg by mouth once daily.      estradioL (ESTRACE) 0.01 % (0.1 mg/gram) vaginal cream Place vaginally once daily.      FLUoxetine 40 MG capsule Take 40 mg by  mouth once daily.      hydrocodone-acetaminophen (HYCET) solution 7.5-325 mg/15mL Take by mouth 4 (four) times daily as needed for Pain.      ipratropium (ATROVENT) 42 mcg (0.06 %) nasal spray 2 sprays by Each Nostril route 4 (four) times daily.      irbesartan (AVAPRO) 150 MG tablet Take 150 mg by mouth every evening.      tiZANidine (ZANAFLEX) 2 MG tablet 1 at bedtime for 3 nights then 2 every night for 3 nights then 3 every night to follow. Stay at the most comfortable dose. 90 tablet 2     No current facility-administered medications for this visit.       REVIEW OF SYSTEMS:    GENERAL:  No weight loss, malaise or fevers.  HEENT:  Negative for frequent or significant headaches.  NECK:  Negative for lumps, goiter, pain and significant neck swelling.  RESPIRATORY:  Negative for cough, wheezing or shortness of breath.  CARDIOVASCULAR:  Negative for chest pain, leg swelling or palpitations. HTN  GI:  Negative for abdominal discomfort, blood in stools or black stools or change in bowel habits.  MUSCULOSKELETAL:  See HPI.  SKIN:  Negative for lesions, rash, and itching.  PSYCH:  Negative for sleep disturbance, mood disorder and recent psychosocial stressors.  HEMATOLOGY/LYMPHOLOGY:  Negative for prolonged bleeding, bruising easily or swollen nodes.  NEURO:   No history of headaches, syncope, paralysis, seizures or tremors.  All other reviewed and negative other than HPI.    Past Medical History:  Past Medical History:   Diagnosis Date    Anticoagulant long-term use     Hypertension        Past Surgical History:  Past Surgical History:   Procedure Laterality Date    TRANSFORAMINAL EPIDURAL INJECTION OF STEROID Right 8/14/2023    Procedure: INJECTION, STEROID, EPIDURAL, TRANSFORAMINAL APPROACH, RIGHT T12/L1 SOONER DATE;  Surgeon: Isi Womack MD;  Location: [facility]IN [facility]GT;  Service: Pain Management;  Laterality: Right;    TRANSFORAMINAL EPIDURAL INJECTION OF STEROID Right 12/18/2023    Procedure: LUMBAR TRANSFORAMINAL  "RIGHT T12/L1 AND L1/2;  Surgeon: Isi Womack MD;  Location: [facility] PAIN [facility]GT;  Service: Pain Management;  Laterality: Right;  [phone]  2 WK F/U DEEPALI    TRANSFORAMINAL EPIDURAL INJECTION OF STEROID Right 8/29/2024    Procedure: THORACIC TRANSFORAMINAL RIGHT T12/L1 AND L1/2 *ASPIRIN OTC* HOLD FOR 5 DAYS;  Surgeon: Isi Womack MD;  Location: [facility] PAIN MGT;  Service: Pain Management;  Laterality: Right;  [phone]  2 WK F/U DEEPALI       Family History:  No family history on file.    Social History:  Social History     Socioeconomic History    Marital status:    Tobacco Use    Smoking status: Former     Types: Cigarettes     Passive exposure: Past    Smokeless tobacco: Never    Tobacco comments:     Quit 20 years ago   Substance and Sexual Activity    Alcohol use: Not Currently    Drug use: Never    Sexual activity: Not Currently     Social Drivers of Health     Financial Resource Strain: Low Risk  (8/6/2024)    Overall Financial Resource Strain (CARDIA)     Difficulty of Paying Living Expenses: Not hard at all   Food Insecurity: Unknown (8/6/2024)    Hunger Vital Sign     Ran Out of Food in the Last Year: Never true   Physical Activity: Sufficiently Active (8/6/2024)    Exercise Vital Sign     Days of Exercise per Week: 5 days     Minutes of Exercise per Session: 50 min   Stress: Stress Concern Present (8/6/2024)    [employer] - Occupational Stress Questionnaire     Feeling of Stress : To some extent   Housing Stability: Unknown (8/6/2024)    Housing Stability Vital Sign     Unable to Pay for Housing in the Last Year: No       OBJECTIVE:    Vitals:    10/02/24 1109   BP: (!) 156/74   Pulse: 64   Resp: 18   SpO2: 100%   Weight: 69.7 kg (153 lb 10.6 oz)   Height: 5' 4" (1.626 m)   PainSc:   2   PainLoc: Back       PHYSICAL EXAMINATION:    General appearance: Well appearing, in no acute distress, alert and oriented x3.  Psych:  Mood and affect appropriate.  Skin: Skin color, " texture, turgor normal, no rashes or lesions, in both upper and lower body.  Head/face:  Atraumatic, normocephalic.    Neck: There is pain with palpation over cervical paraspinals and trapezius.Spurling Negative. Limited ROM with pain on extension and lateral rotation.    Cor: Capillary refill <3 seconds.  Pulm: Symmetric chest rise, no respiratory distress noted.   Back:  There is pain with palpation over thoracic facet joints at approximated level of T8-9 Normal ROM   Extremities: No deformities, edema, or skin discoloration.   Musculoskeletal:  Bilateral upper extremity strength is normal and symmetric.  No atrophy or tone abnormalities are noted.  Neuro:  No loss of sensation is noted.    Gait: Normal.    ASSESSMENT: 68 y.o. year old female with back and neck pain, consistent with the followin. Cervical radiculopathy        2. Cervical spondylosis        3. Decreased ROM of thoracic spine                  PLAN:     - Previous imaging was reviewed and discussed with the patient today. She has thyroid follow up with her PCP outside [facility].     - Schedule for C7/T1 IL SANJUANA.     - We can repeat right T12/L1 and L1/2 TF SANJUANA as needed.     - Continue physical therapy.     - Continue home exercise routine.    - RTC 2 weeks after above procedure.     The above plan and management options were discussed at length with patient. Patient is in agreement with the above and verbalized understanding.    Maggy Weeks NP  10/02/2024

## 2024-10-02 NOTE — PROGRESS NOTES
Chronic Patient Established Note         SUBJECTIVE:    Interval History 10/2/2024:  The patient returns to clinic today for follow up of neck and mid back pain. She is here today for imaging review. She continues to report neck pain that radiates into the shoulder. She also notes headaches. She denies any radicular arm pain. Her pain is worse with turning her head and activity. She is performing a home exercise routine. She denies any other health changes. Her pain today is 2/10.     Interval History 9/13/2024:  The patient returns to clinic today for follow up of pain. She is s/p right T12/L1 and L1/2 TF SANJUANA on 8/29/2024. She reports 100% relief of her back pain. She reports increased neck pain. She describes this pain as pressure. She does endorse head pain at the base that radiates forward. She also reports pain that radiates into the shoulder blade and around the rib. Her pain is worse with activity. She denies any radicular arm pain. She continues to perform a home exercise routine. She denies any other health changes. Her pain today is 8/10.    Interval History 7/23/2024:  The patient returns to clinic today for follow up of pain via virtual visit. She reports increased right shoulder pain over the few days. She was reaching down to get something off the floor. She felt something give in her shoulder. This pain felt similar to her pain before rotator cuff surgery. She did take Flexeril and 1/2 a Norco last night with benefit. The pain is much improved today. She also reports increased mid back pain over the last 2 months. She reports mid back pain that radiates into her ribs, right greater than left. She is performing home exercise. She denies any other health changes.     Interval History 4/3/2024:  Patient returns to clinic for follow up of neck and back pain. She is s/p TPI's on 2/8/24, which she reports has given her 100% percent relief for 2 months. Patient expresses that bilateral neck pain has returned  "+ mid back pain. Patient expresses that with increased acitivity thorugh the day the muscles of the neck feel tighter. Patient expresses she continues to do home exercises + weights, and pickleball, feels like the exercises have not improved her pain.  Patient is not taking robaxin 500mg TID, she expresses taking flexeril at night as needed, and utilizes a heating pad. Patient states her current pain level is 4/10. Patient describes back pain has returned x 1 month, now is localized to the center of the back without radiation.     Interval History 3/5/2024:  The patient has a virtual follow up for neck and back pain. She is s/p TPIs for neck pain which she says helped 100% for that pain. Her primary complaint today is middle back pain. She had thoracic SANJUANA in the past but feels like this is slightly higher. However, she says that it is tolerable at this time. She completed PT today and says that she plans to keep up with her home exercises. Her overall pain today is 5/10.    Interval History 1/10/2024:  Rebecca Johnston Zollinger returns to clinic today s/p Right T12/L1 and L1/L2 TA SANJUANA on 12/18/2023.  She reports 85% relief that is ongoing.  She is back to walking 2 miles again.  She does find that when she exerts herself to her full extent that the pain returns slightly.  Today she continues to complain of neck pain from her previous visit and of new mid back pain.  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  She states that the pain is located bilaterally at the base of her skull and into her neck, left greater than right.  She describes the pain as pressure and it feels like "a clamp". Sometimes she has a headache by the end of the day.  The mid back pain is occasionally present in the AM and is relieve by stretching.  It is exacerbated by aerobic activity and pickleball.  The pain will wrap around right side of ribs and " under her breast. .  She has not started  PT yet-she was supposed to start yesterday, but the appointment was cancelled due to weather. Denies bowel and bladder dysfunction, fever, chills, nightsweats or weight changes. She continues Robaxin Pain today is 5/10.    She denies any new weakness, denies fevers, numbness, saddle anesthesia or incontinence.     Interval History 12/15/2023:  The patient returns to clinic today for follow up of back pain. She reports increased neck pain over the last month. She reports neck pain and pain at the base of her head, left side greater than right. This pain is worse with bending forward. She has tried Excedrin and Flexeril with limited relief. She denies any radicular arm pain. She continues to report right sided mid and low back pain. She does have radiating pain into the right groin and anterior thigh. She is scheduled for SANJUANA next week. She continues to perform a home exercise routine. She denies any other health changes. Her pain today is 8/10.     Interval History 11/21/2023:  The patient returns to clinic today for follow up of back pain via virtual visit. She is here today for imaging review. She reports worsened right sided mid and low back pain. This radiates into the right groin and anterior thigh. She denies any left leg pain. Her pain is worse with prolonged walking. She is performing a home exercise routine. She denies any other health changes.     Interval History 8/28/2023:  Rebecca Johnston Zollinger presents to the clinic for a follow-up appointment for back pain via virtual visit. She is s/p right T12/L1 TF SANJUANA on 8/14/2023. She reports 100% relief of her mid back pain. She continues to report low back pain that radiates into her groin bilaterally, right greater than left. This is worse with prolonged walking. She is no longer able to walk 4 miles, which is her typical exercise routine. She has completed multiple rounds of PT. She denies any other health  changes.       Original HPI:  67 year old female who presents with Right sided lower back pain. 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 The pain is located in her R back and goes to the R hip and groin when she walks. At worst it is a 10/10, baseline is a 3/10. She describes it as aching. She used to be able to walk 4 miles, now she states she can only walk 1 mile without pain. Last saw pain management at  2 years ago, no interventions performed. She has tried multiple rounds of PT, most recently in 2022 with some improvement, completed over 6 weeks of PT. She takes occasional ibuprofen and tylenol. Used to take tramadol and opioids but is no longer taking. She has had an MRI back in 2021 showing protruding disc at T12-L1. She denies any new weakness, denies fevers, numbness, saddle anesthesia or incontinence.     Pain Disability Index Review:      9/13/2024    10:08 AM 4/3/2024     8:09 AM 1/10/2024     7:32 AM   Last 3 PDI Scores   Pain Disability Index (PDI) 56 [phone]       Pain Medications:  None    Opioid Contract: not applicable     report:  Not applicable    Pain Procedures:   8/14/2023- Right T12/L1 TF SANJUANA  12/18/2023- Right T12/L1 and L1/2 TF SANJUANA- 85% relief  8/29/2024- Right T12/L1 and L1/2 TF SANJUANA- 100% relief    Physical Therapy/Home Exercise: yes    Imaging:  MRI Cervical Spine 9/26/2024:  COMPARISON:  12/15/2023     FINDINGS:  Alignment: Normal.     Vertebrae: Normal marrow signal. No fracture.     Discs: Disc space narrowing present C5-C6-C7 with marginal osteophytosis     Cord: Normal.     Skull base and craniocervical junction: Normal.     Degenerative findings:     C2-C3: There is no focal disc herniation.No significant central canal narrowing.  No significant neural foraminal narrowing.     C3-C4: There is no focal disc herniation.No significant central canal narrowing.  Mild LEFT neural foraminal  narrowing.     C4-C5: There is no focal disc herniation.No significant central canal narrowing.  Moderate LEFT neural foraminal narrowing.     C5-C6: Posterior marginal osteophytic disc spur complex.There is no focal disc herniation.No significant central canal narrowing.  Moderate-severe RIGHT mild LEFT neural foraminal narrowing.     C6-C7: Posterior marginal osteophytic disc spur complex.There is no focal disc herniation.No significant central canal narrowing.  Moderate-severe LEFT mild RIGHT neural foraminal narrowing.     C7-T1: There is no focal disc herniation.No significant central canal narrowing.  No significant neural foraminal narrowing.     Paraspinal muscles & soft tissues: Multinodular goiter with dominant nodule RIGHT thyroid lobe, hyperintense measuring 2.1 cm.  Thyroid ultrasound recommended..     Impression:     Multilevel lumbar spondylosis most evident C5-C7 disc space levels with marginal osteophytosis and uncovertebral hypertrophy resulting in associated neural foraminal encroachment as detailed above.     Multiple thyroid nodules largest of which of RIGHT thyroid lobe 2.1 cm.  Routine thyroid ultrasound recommended for further evaluation.    MRI Thoracic Spine 9/26/2024:  COMPARISON:  None.     FINDINGS:  Alignment: The thoracic spine demonstrates proper alignment.     Vertebra: The vertebral bodies show normal signal intensity and height with no indication of acute fracture or pathologic marrow replacement process.  Suspect incidental hemangioma T10 with cortical endplate degenerative changes most evident T7-T8 through T9-T10     Disc: Multilevel disc space narrowing and desiccation with posterior marginal osteophytic spurring.     Cord: The demonstrated portion of the spinal cord is normal in signal intensity at all levels with no indication of myelomalacia or cord edema.     Evaluation of the surrounding soft tissue structures demonstrates no acute abnormality.  As noted in the cervical  portion of the report, multiple nodules within the thyroid for which routine thyroid ultrasound recommended.     Degenerative findings: There is a extradural soft tissue density at the T12 level lateralizing RIGHT with mass effect upon the thecal sac.  This is associated with a RIGHT foraminal disc from the T12-L1 level.  These findings are identified on series 7 image 55 and series 7, image 53, confirmed on sagittal series 4, image 8. there is associated mass effect upon the thecal sac yet no evidence for cord deviation.     Impression:     RIGHT foraminal disc protrusion T12-L1 with extruded disc material RIGHT extradural T12 level      XR CERVICAL SPINE 5 VIEW WITH FLEX AND EXT-12/15/2023  COMPARISON:  None.     FINDINGS:  Vertebral body heights are maintained.     Disc space narrowing and endplate changes C5-6 and C6-7.  Small posterior osteophytes at these levels.     Oblique views show bony narrowing of the neural foramina C5-6 on the right and C4-5 and C5-6 on the left.     Reversal of the normal cervical lordosis in the lower cervical spine.  Otherwise, AP alignment appears anatomic.     No significant prevertebral soft tissue edema.     Impression:     Degenerative change as above.       MRI Lumbar Spine 9/22/2023:  COMPARISON:  None.     FINDINGS:  Alignment: Grade 1 retrolisthesis of L5-S1.  Straightening of lumbar lordosis.     Vertebrae: Multilevel degenerative endplate changes no fracture or marrow infiltrative process.     Discs: Moderate to severe disc height loss noted throughout the lumbar spine.  No evidence for discitis.     Cord: Conus terminates at L1-L2 and appears unremarkable.  Cauda equina appears unremarkable.     Degenerative findings:     T11-T12: Right paracentral disc extrusion results in moderate effacement of the right lateral recess and mass effect upon the right T12 nerve root.     T12-L1: Circumferential disc bulge with right paracentral disc extrusion result in mild effacement of  the right lateral recess.     L1-L2: Circumferential disc bulge and mild facet arthropathy result in mild bilateral neural foraminal narrowing.     L2-L3: Circumferential disc bulge and mild facet arthropathy result in mild left neural foraminal narrowing.     L3-L4: Circumferential disc bulge and moderate facet arthropathy result in mild spinal canal stenosis and mild bilateral neural foraminal narrowing.     L4-L5: Circumferential disc bulge and mild facet arthropathy result in mild effacement of the lateral recesses and mild bilateral neural foraminal narrowing peer     L5-S1: Circumferential disc bulge and mild facet arthropathy result in moderate right, mild left neural foraminal narrowing.     Paraspinal muscles & soft tissues: Moderate paraspinal muscle atrophy.  4.0 cm left renal cyst.     Impression:     1. Multilevel degenerative changes of the lumbar and lower thoracic spine as detailed above.     Xray Hip 7/5/2023:  FINDINGS:  No acute fractures.  Some degenerative changes visualized lower lumbar spine to include endplate osteophytes and lumbosacral disc narrowing and possible vacuum phenomenon.  Intact right and left SI joints.  No definite narrowing of right or left hip joint spaces or acetabular spurring.  Preserved right and left femoral head contours.     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      aspirin (ECOTRIN) 81 MG EC tablet Take 81 mg by mouth once daily.      estradioL (ESTRACE) 0.01 % (0.1 mg/gram) vaginal cream Place vaginally once daily.      FLUoxetine 40 MG capsule Take 40 mg by  mouth once daily.      hydrocodone-acetaminophen (HYCET) solution 7.5-325 mg/15mL Take by mouth 4 (four) times daily as needed for Pain.      ipratropium (ATROVENT) 42 mcg (0.06 %) nasal spray 2 sprays by Each Nostril route 4 (four) times daily.      irbesartan (AVAPRO) 150 MG tablet Take 150 mg by mouth every evening.      tiZANidine (ZANAFLEX) 2 MG tablet 1 at bedtime for 3 nights then 2 every night for 3 nights then 3 every night to follow. Stay at the most comfortable dose. 90 tablet 2     No current facility-administered medications for this visit.       REVIEW OF SYSTEMS:    GENERAL:  No weight loss, malaise or fevers.  HEENT:  Negative for frequent or significant headaches.  NECK:  Negative for lumps, goiter, pain and significant neck swelling.  RESPIRATORY:  Negative for cough, wheezing or shortness of breath.  CARDIOVASCULAR:  Negative for chest pain, leg swelling or palpitations. HTN  GI:  Negative for abdominal discomfort, blood in stools or black stools or change in bowel habits.  MUSCULOSKELETAL:  See HPI.  SKIN:  Negative for lesions, rash, and itching.  PSYCH:  Negative for sleep disturbance, mood disorder and recent psychosocial stressors.  HEMATOLOGY/LYMPHOLOGY:  Negative for prolonged bleeding, bruising easily or swollen nodes.  NEURO:   No history of headaches, syncope, paralysis, seizures or tremors.  All other reviewed and negative other than HPI.    Past Medical History:  Past Medical History:   Diagnosis Date    Anticoagulant long-term use     Hypertension        Past Surgical History:  Past Surgical History:   Procedure Laterality Date    TRANSFORAMINAL EPIDURAL INJECTION OF STEROID Right 8/14/2023    Procedure: INJECTION, STEROID, EPIDURAL, TRANSFORAMINAL APPROACH, RIGHT T12/L1 SOONER DATE;  Surgeon: Isi Womack MD;  Location: [facility] PAIN [facility]GT;  Service: Pain Management;  Laterality: Right;    TRANSFORAMINAL EPIDURAL INJECTION OF STEROID Right 12/18/2023    Procedure: LUMBAR TRANSFORAMINAL  "RIGHT T12/L1 AND L1/2;  Surgeon: Isi Womack MD;  Location: [facility] PAIN [facility]GT;  Service: Pain Management;  Laterality: Right;  [phone]  2 WK F/U DEEPALI    TRANSFORAMINAL EPIDURAL INJECTION OF STEROID Right 8/29/2024    Procedure: THORACIC TRANSFORAMINAL RIGHT T12/L1 AND L1/2 *ASPIRIN OTC* HOLD FOR 5 DAYS;  Surgeon: Isi Womack MD;  Location: [facility] PAIN MGT;  Service: Pain Management;  Laterality: Right;  [phone]  2 WK F/U DEEPALI       Family History:  No family history on file.    Social History:  Social History     Socioeconomic History    Marital status:    Tobacco Use    Smoking status: Former     Types: Cigarettes     Passive exposure: Past    Smokeless tobacco: Never    Tobacco comments:     Quit 20 years ago   Substance and Sexual Activity    Alcohol use: Not Currently    Drug use: Never    Sexual activity: Not Currently     Social Drivers of Health     Financial Resource Strain: Low Risk  (8/6/2024)    Overall Financial Resource Strain (CARDIA)     Difficulty of Paying Living Expenses: Not hard at all   Food Insecurity: Unknown (8/6/2024)    Hunger Vital Sign     Ran Out of Food in the Last Year: Never true   Physical Activity: Sufficiently Active (8/6/2024)    Exercise Vital Sign     Days of Exercise per Week: 5 days     Minutes of Exercise per Session: 50 min   Stress: Stress Concern Present (8/6/2024)    [employer] [place] [employer] - Occupational Stress Questionnaire     Feeling of Stress : To some extent   Housing Stability: Unknown (8/6/2024)    Housing Stability Vital Sign     Unable to Pay for Housing in the Last Year: No       OBJECTIVE:    Vitals:    10/02/24 1109   BP: (!) 156/74   Pulse: 64   Resp: 18   SpO2: 100%   Weight: 69.7 kg (153 lb 10.6 oz)   Height: 5' 4" (1.626 m)   PainSc:   2   PainLoc: Back       PHYSICAL EXAMINATION:    General appearance: Well appearing, in no acute distress, alert and oriented x3.  Psych:  Mood and affect appropriate.  Skin: Skin color, " texture, turgor normal, no rashes or lesions, in both upper and lower body.  Head/face:  Atraumatic, normocephalic.    Neck: There is pain with palpation over cervical paraspinals and trapezius.Spurling Negative. Limited ROM with pain on extension and lateral rotation.    Cor: Capillary refill <3 seconds.  Pulm: Symmetric chest rise, no respiratory distress noted.   Back:  There is pain with palpation over thoracic facet joints at approximated level of T8-9 Normal ROM   Extremities: No deformities, edema, or skin discoloration.   Musculoskeletal:  Bilateral upper extremity strength is normal and symmetric.  No atrophy or tone abnormalities are noted.  Neuro:  No loss of sensation is noted.    Gait: Normal.    ASSESSMENT: 68 y.o. year old female with back and neck pain, consistent with the followin. Cervical radiculopathy        2. Cervical spondylosis        3. Decreased ROM of thoracic spine                  PLAN:     - Previous imaging was reviewed and discussed with the patient today. She has thyroid follow up with her PCP outside [facility].     - Schedule for C7/T1 IL SANJUANA.     - We can repeat right T12/L1 and L1/2 TF SANJUANA as needed.     - Continue physical therapy.     - Continue home exercise routine.    - RTC 2 weeks after above procedure.     The above plan and management options were discussed at length with patient. Patient is in agreement with the above and verbalized understanding.    Maggy Weeks NP  10/02/2024

## 2024-10-03 ENCOUNTER — PATIENT MESSAGE (OUTPATIENT)
Dept: PAIN MEDICINE | Facility: OTHER | Age: 69
End: 2024-10-03
Payer: MEDICARE

## 2024-10-08 ENCOUNTER — CLINICAL SUPPORT (OUTPATIENT)
Dept: REHABILITATION | Facility: HOSPITAL | Age: 69
End: 2024-10-08
Payer: MEDICARE

## 2024-10-08 DIAGNOSIS — R29.898 DECREASED ROM OF NECK: ICD-10-CM

## 2024-10-08 DIAGNOSIS — M47.812 CERVICAL SPONDYLOSIS: Primary | ICD-10-CM

## 2024-10-08 DIAGNOSIS — M79.18 MYOFASCIAL PAIN: ICD-10-CM

## 2024-10-08 DIAGNOSIS — M53.84 DECREASED ROM OF THORACIC SPINE: ICD-10-CM

## 2024-10-08 PROCEDURE — 97110 THERAPEUTIC EXERCISES: CPT | Performed by: PHYSICAL THERAPIST

## 2024-10-08 PROCEDURE — 97112 NEUROMUSCULAR REEDUCATION: CPT | Performed by: PHYSICAL THERAPIST

## 2024-10-08 PROCEDURE — 97530 THERAPEUTIC ACTIVITIES: CPT | Performed by: PHYSICAL THERAPIST

## 2024-10-09 ENCOUNTER — PATIENT MESSAGE (OUTPATIENT)
Dept: SPINE | Facility: CLINIC | Age: 69
End: 2024-10-09
Payer: MEDICARE

## 2024-10-09 DIAGNOSIS — M53.84 DECREASED ROM OF THORACIC SPINE: Primary | ICD-10-CM

## 2024-10-09 DIAGNOSIS — M54.6 THORACIC SPINE PAIN: ICD-10-CM

## 2024-10-10 ENCOUNTER — HOSPITAL ENCOUNTER (OUTPATIENT)
Facility: OTHER | Age: 69
Discharge: HOME | End: 2024-10-10
Attending: STUDENT IN AN ORGANIZED HEALTH CARE EDUCATION/TRAINING PROGRAM | Admitting: ANESTHESIOLOGY
Payer: MEDICARE

## 2024-10-10 VITALS
SYSTOLIC BLOOD PRESSURE: 135 MMHG | DIASTOLIC BLOOD PRESSURE: 69 MMHG | HEART RATE: 52 BPM | TEMPERATURE: 98 F | RESPIRATION RATE: 14 BRPM | OXYGEN SATURATION: 97 %

## 2024-10-10 DIAGNOSIS — G89.29 CHRONIC PAIN: ICD-10-CM

## 2024-10-10 DIAGNOSIS — M54.12 CERVICAL RADICULOPATHY: Primary | ICD-10-CM

## 2024-10-10 PROCEDURE — 25500020 PHARM REV CODE 255: Performed by: STUDENT IN AN ORGANIZED HEALTH CARE EDUCATION/TRAINING PROGRAM

## 2024-10-10 PROCEDURE — 62321 NJX INTERLAMINAR CRV/THRC: CPT | Performed by: STUDENT IN AN ORGANIZED HEALTH CARE EDUCATION/TRAINING PROGRAM

## 2024-10-10 PROCEDURE — 62321 NJX INTERLAMINAR CRV/THRC: CPT | Mod: ,,, | Performed by: STUDENT IN AN ORGANIZED HEALTH CARE EDUCATION/TRAINING PROGRAM

## 2024-10-10 PROCEDURE — 63600175 PHARM REV CODE 636 W HCPCS: Performed by: STUDENT IN AN ORGANIZED HEALTH CARE EDUCATION/TRAINING PROGRAM

## 2024-10-10 RX ORDER — LIDOCAINE HYDROCHLORIDE 20 MG/ML
INJECTION, SOLUTION INFILTRATION; PERINEURAL
Status: DISCONTINUED | OUTPATIENT
Start: 2024-10-10 | End: 2024-10-10 | Stop reason: HOSPADM

## 2024-10-10 RX ORDER — MIDAZOLAM HYDROCHLORIDE 1 MG/ML
INJECTION INTRAMUSCULAR; INTRAVENOUS
Status: DISCONTINUED | OUTPATIENT
Start: 2024-10-10 | End: 2024-10-10 | Stop reason: HOSPADM

## 2024-10-10 RX ORDER — SODIUM CHLORIDE 9 MG/ML
INJECTION, SOLUTION INTRAVENOUS CONTINUOUS
Status: DISCONTINUED | OUTPATIENT
Start: 2024-10-10 | End: 2024-10-10 | Stop reason: HOSPADM

## 2024-10-10 RX ORDER — ONDANSETRON HYDROCHLORIDE 2 MG/ML
INJECTION, SOLUTION INTRAVENOUS
Status: DISCONTINUED | OUTPATIENT
Start: 2024-10-10 | End: 2024-10-10 | Stop reason: HOSPADM

## 2024-10-10 RX ORDER — LIDOCAINE HYDROCHLORIDE 10 MG/ML
INJECTION, SOLUTION EPIDURAL; INFILTRATION; INTRACAUDAL; PERINEURAL
Status: DISCONTINUED | OUTPATIENT
Start: 2024-10-10 | End: 2024-10-10 | Stop reason: HOSPADM

## 2024-10-10 RX ORDER — DEXAMETHASONE SODIUM PHOSPHATE 10 MG/ML
INJECTION INTRAMUSCULAR; INTRAVENOUS
Status: DISCONTINUED | OUTPATIENT
Start: 2024-10-10 | End: 2024-10-10 | Stop reason: HOSPADM

## 2024-10-10 NOTE — DISCHARGE SUMMARY
Discharge Note  Short Stay      SUMMARY     Admit Date: 10/10/2024    Attending Physician: Devon Gamble MD    Discharge Physician: Devon Gamble MD      Discharge Date: 10/10/2024 9:20 AM    Procedure(s) (LRB):  CERVICAL C7/T1 IL SANJUANA *ASPIRIN OTC* HOLD FOR 5 DAYS  **EISSA PT** (N/A)    Final Diagnosis: Cervical radiculopathy [M54.12]    Disposition: Home or self care    Patient Instructions:   Current Discharge Medication List        CONTINUE these medications which have NOT CHANGED    Details   alendronate (FOSAMAX) 70 MG tablet Take 70 mg by mouth every 7 days.      aspirin (ECOTRIN) 81 MG EC tablet Take 81 mg by mouth once daily.      estradioL (ESTRACE) 0.01 % (0.1 mg/gram) vaginal cream Place vaginally once daily.      FLUoxetine 40 MG capsule Take 40 mg by mouth once daily.      hydrocodone-acetaminophen (HYCET) solution 7.5-325 mg/15mL Take by mouth 4 (four) times daily as needed for Pain.      ipratropium (ATROVENT) 42 mcg (0.06 %) nasal spray 2 sprays by Each Nostril route 4 (four) times daily.      irbesartan (AVAPRO) 150 MG tablet Take 150 mg by mouth every evening.      tiZANidine (ZANAFLEX) 2 MG tablet 1 at bedtime for 3 nights then 2 every night for 3 nights then 3 every night to follow. Stay at the most comfortable dose.  Qty: 90 tablet, Refills: 2                 Discharge Diagnosis: Cervical radiculopathy [M54.12]  Condition on Discharge: Stable with no complications to procedure   Diet on Discharge: Same as before.  Activity: as per instruction sheet.  Discharge to: Home with a responsible adult.  Follow up: 2-4 weeks       Please call my office or pager at [phone] if experienced any weakness or loss of sensation, fever > 101.5, pain uncontrolled with oral medications, persistent nausea/vomiting/or diarrhea, redness or drainage from the incisions, or any other worrisome concerns. If physician on call was not reached or could not communicate with our office for any reason please go to the  [facility] emergency department     Marcos Serna M.D.  PGY-5  Interventional Pain Management Fellow  [facility]  Pager: [phone]

## 2024-10-10 NOTE — OP NOTE
Cervical Interlaminar Epidural Steroid Injection under Fluoroscopic Guidance    The procedure, risks, benefits, and options were discussed with the patient. There are no contraindications to the procedure. The patent expressed understanding and agreed to the procedure. Informed written consent was obtained prior to the start of the procedure and can be found in the patient's chart.     PATIENT NAME: Rebecca Zollinger   MRN: [id number]     DATE OF PROCEDURE: 10/10/2024    PROCEDURE: Cervical Interlaminar Epidural Steroid Injection C7/T1 under Fluoroscopic Guidance    PRE-OP DIAGNOSIS: Cervical radiculopathy [M54.12] Cervical radiculopathy [M54.12]    POST-OP DIAGNOSIS: Same    PHYSICIAN: Devon Gamble MD     ASSISTANTS: Mamie Pickering MD     MEDICATIONS INJECTED: Preservative-free Decadron 10mg with 1cc of Lidocaine 1% MPF and preservative free normal saline    LOCAL ANESTHETIC INJECTED: Xylocaine 2%     SEDATION: Versed 2mg and Fentanyl 0mcg                                                                                                                                                                                     Conscious sedation ordered by M.D. Patient re-evaluation prior to administration of conscious sedation. No changes noted in patient's status from initial evaluation. The patient's vital signs were monitored by RN and patient remained hemodynamically stable throughout the procedure.    Event Time In   Sedation Start 0916   Sedation End 0923       ESTIMATED BLOOD LOSS: None    COMPLICATIONS: None    TECHNIQUE: Time-out was performed to identify the patient and procedure to be performed. With the patient laying in a prone position, the surgical area was prepped and draped in the usual sterile fashion using ChloraPrep and a fenestrated drape. The level was determined under fluoroscopy guidance. Skin anesthesia was achieved by injecting Lidocaine 2% over the injection site.  The interlaminar space was then  approached with a 20 gauge, 3.5 inch Tuohy needle that was introduced under fluoroscopic guidance with AP, lateral and/or contralateral oblique imaging. Once the Ligamentum flavum was encountered loss of resistance to saline was used to enter the epidural space. With positive loss of resistance and negative aspiration for CSF or Blood, contrast dye  Omnipaque (300mg/mL) was injected to confirm placement and there was no vascular runoff. Then 3 mL of the medication mixture listed above was then injected slowly. Displacement of the radio opaque contrast after injection of the medication confirmed that the medication went into the area of the epidural space. The needles were removed, and bleeding was nil. A sterile dressing was applied. No specimens collected. The patient tolerated the procedure well.       The patient was monitored after the procedure in the recovery area. They were given post-procedure and discharge instructions to follow at home. The patient was discharged in a stable condition.    Marcos Serna MD    I reviewed and edited the fellow's note. I conducted my own interview and physical examination. I agree with the findings. I was present and supervising all critical portions of the procedure.    I reviewed and edited the fellow/resident/student/provider's note. I conducted my own interview and physical examination. I agree with the findings. I was present and supervising all critical portions of the procedure.    Devon Gamble MD PhD

## 2024-10-10 NOTE — DISCHARGE INSTRUCTIONS

## 2024-10-12 ENCOUNTER — PATIENT MESSAGE (OUTPATIENT)
Dept: ADMINISTRATIVE | Facility: OTHER | Age: 69
End: 2024-10-12
Payer: MEDICARE

## 2024-10-15 ENCOUNTER — CLINICAL SUPPORT (OUTPATIENT)
Dept: REHABILITATION | Facility: HOSPITAL | Age: 69
End: 2024-10-15
Payer: MEDICARE

## 2024-10-15 ENCOUNTER — PATIENT MESSAGE (OUTPATIENT)
Dept: PAIN MEDICINE | Facility: OTHER | Age: 69
End: 2024-10-15
Payer: MEDICARE

## 2024-10-15 DIAGNOSIS — M53.84 DECREASED ROM OF THORACIC SPINE: ICD-10-CM

## 2024-10-15 DIAGNOSIS — M54.14 THORACIC RADICULOPATHY: Primary | ICD-10-CM

## 2024-10-15 DIAGNOSIS — M79.18 MYOFASCIAL PAIN: ICD-10-CM

## 2024-10-15 DIAGNOSIS — R29.898 DECREASED ROM OF NECK: ICD-10-CM

## 2024-10-15 DIAGNOSIS — M47.812 CERVICAL SPONDYLOSIS: Primary | ICD-10-CM

## 2024-10-15 DIAGNOSIS — M54.6 THORACIC SPINE PAIN: ICD-10-CM

## 2024-10-15 PROCEDURE — 97014 ELECTRIC STIMULATION THERAPY: CPT | Performed by: PHYSICAL THERAPIST

## 2024-10-15 PROCEDURE — 97112 NEUROMUSCULAR REEDUCATION: CPT | Performed by: PHYSICAL THERAPIST

## 2024-10-15 PROCEDURE — 97012 MECHANICAL TRACTION THERAPY: CPT | Performed by: PHYSICAL THERAPIST

## 2024-10-15 PROCEDURE — 97110 THERAPEUTIC EXERCISES: CPT | Performed by: PHYSICAL THERAPIST

## 2024-10-15 PROCEDURE — 97140 MANUAL THERAPY 1/> REGIONS: CPT | Performed by: PHYSICAL THERAPIST

## 2024-10-15 NOTE — PROGRESS NOTES
[facility]   Physical Therapy Treatment Note        Name: Xochitl [place]enegro Zollinger [facility] Number: [id number]    Therapy Diagnosis:   Encounter Diagnoses   Name Primary?    Cervical spondylosis Yes    Decreased ROM of neck     Decreased ROM of thoracic spine     Myofascial pain        Physician: Maggy Weeks NP    Visit Date: 10/15/2024    Physician Orders: PT Eval and Treat   Medical Diagnosis from Referral: M47.812 (ICD-10-CM) - Cervical spondylosis   Evaluation Date: 9/24/2024  Authorization Period Expiration: 9/13/25  Plan of Care Expiration: 12/24/24  Progress Note Due: 10/24/24  Visit # / Visits authorized: 1/ 1 , 3/10   FOTO: 1/ 3     Precautions: Standard     PTA Visit #: 0/5   Date of last FOTO:9/24/24    Time In: 8:08am  Time Out: 9:04 am  Total Billable Time: 54  minutes    SUBJECTIVE     Pt reports: she had an epidural injection, but pain has returned to baseline for now   She was compliant with home exercise program.  Response to previous treatment: reduced neck tension   Functional change: none    Pain: 1/10  Location: right upper trap tightness     OBJECTIVE     Objective Measures updated at progress report unless specified.     Treatment     Xochitl received the treatments listed below:  (new treatments are indicated in bold)    therapeutic exercises to develop strength, endurance, ROM, and flexibility for 8 minutes including:    [x] UBE 3'fwd/3'bkwd Lvl 4  []    manual therapy techniques: Joint mobilizations, Manual traction, Myofacial release, and Soft tissue Mobilization were applied to the: cervical spine for 0   minutes, including:  []suboccipital release   [] PA C3-6 joint mobs in supine  [] manual cervical traction   [] STM R upper trap and cervical paraspinals in L sidelying         neuromuscular re-education activities to improve: Coordination, Kinesthetic, Sense, Proprioception, and Posture for 23 minutes. The following activities were included:  [] cervical  retractions x10   [x] cervical retractions with YTB x15   [x] iso cervical SB Ylw TB x10 B   [x] open books on wall x10 B   [x] supine pec stretch 1/2 foam roller 2'  [x] 1/2 foam roller (supine) snow angels x12  [x] straight arm pull downs green TB 2x10       therapeutic activities to improve functional performance for 0  minutes, including:    []  []    [x]Mechanical Static Cervical Traction performed for 8 min at 12# of force    [x] Dry needling performed to B upper trap with (4) 40 mm needles with mechanical winding & Estim for 15 min.     Patient Education and Home Exercises     Home Exercises Provided and Patient Education Provided     Education provided:   - HEP  -educated on home cervical traction devices   -dry needling benefits and side effects     Written Home Exercises Provided: Patient instructed to cont prior HEP. Exercises were reviewed and Xochitl was able to demonstrate them prior to the end of the session.  Xochitl demonstrated good  understanding of the education provided. See EMR under Patient Instructions for exercises provided during therapy sessions    ASSESSMENT     Pt tolerated session well. B upper trap trigger points addressed with dry needling. Patient demonstrated appropriate response to Functional Dry Needling. good  rhythmical contractions observed with estim to treated muscle groups. Improving symptoms with cervical range of motion reported.     Xochitl Is progressing well towards her goals.   Pt prognosis is Fair.     Pt will continue to benefit from skilled outpatient physical therapy to address the deficits listed in the problem list box on initial evaluation, provide pt/family education and to maximize pt's level of independence in the home and community environment.     Pt's spiritual, cultural and educational needs considered and pt agreeable to plan of care and goals.     Anticipated barriers to physical therapy: chronicity of pain, comorbidities     Goals: Short Term Goals: 6  visits  1. Pt will be (I) /c HEP to supplement PT in order to improve functional tasks  2. Pt will improve B cervical rotation range of motion >/= 45 deg for safety with driving  3. Pt will report reduction in morning headaches </= 4 days/week      Long Term Goals: 12 visits   1. Pt will improve B lawson-scaps MMTs to 4-/5 to improve strength for functional activities  2. Pt will improve FOTO score to </= 55% to reduce perceived pain with mobility   3. Pt will report improved ability to sleep without pain >/= 4 nights/week     PLAN     Plan of care Certification: 9/24/2024 to 12/24/24.     Outpatient Physical Therapy 1-2 times weekly for 12 visits to include the following interventions: Manual Therapy, Moist Heat/ Ice, Neuromuscular Re-ed, Patient Education, Self Care, Therapeutic Activities, and Therapeutic Exercise, E-stim and Dry Needling as needed.     Benita Contreras, PT          Same as Pre-Op Diagnosis

## 2024-10-22 ENCOUNTER — PATIENT MESSAGE (OUTPATIENT)
Dept: SPINE | Facility: CLINIC | Age: 69
End: 2024-10-22
Payer: MEDICARE

## 2024-10-22 ENCOUNTER — CLINICAL SUPPORT (OUTPATIENT)
Dept: REHABILITATION | Facility: HOSPITAL | Age: 69
End: 2024-10-22
Payer: MEDICARE

## 2024-10-22 DIAGNOSIS — M79.18 MYOFASCIAL PAIN: ICD-10-CM

## 2024-10-22 DIAGNOSIS — R29.898 DECREASED ROM OF NECK: Primary | ICD-10-CM

## 2024-10-22 DIAGNOSIS — M53.84 DECREASED ROM OF THORACIC SPINE: ICD-10-CM

## 2024-10-22 PROCEDURE — 97110 THERAPEUTIC EXERCISES: CPT | Performed by: PHYSICAL THERAPIST

## 2024-10-22 PROCEDURE — 97112 NEUROMUSCULAR REEDUCATION: CPT | Performed by: PHYSICAL THERAPIST

## 2024-10-22 PROCEDURE — 97530 THERAPEUTIC ACTIVITIES: CPT | Performed by: PHYSICAL THERAPIST

## 2024-10-22 PROCEDURE — 97012 MECHANICAL TRACTION THERAPY: CPT | Performed by: PHYSICAL THERAPIST

## 2024-10-22 NOTE — PROGRESS NOTES
[facility]   Physical Therapy Treatment Note        Name: Xochitl [place] [facility]ollinger [facility] Number: [id number]    Therapy Diagnosis:   Encounter Diagnoses   Name Primary?    Decreased ROM of neck Yes    Decreased ROM of thoracic spine     Myofascial pain          Physician: Maggy Weeks NP    Visit Date: 10/22/2024    Physician Orders: PT Eval and Treat   Medical Diagnosis from Referral: M47.812 (ICD-10-CM) - Cervical spondylosis   Evaluation Date: 9/24/2024  Authorization Period Expiration: 9/13/25  Plan of Care Expiration: 12/24/24  Progress Note Due: 10/24/24  Visit # / Visits authorized: 1/ 1 , 4/10   FOTO: 1/ 3     Precautions: Standard     PTA Visit #: 0/5   Date of last FOTO:9/24/24    Time In: 8:07am  Time Out: 9:00am  Total Billable Time: 53  minutes    SUBJECTIVE     Pt reports: the right side of her neck is better since her injection but the left is still bothering her. She may ask to get the L side injected as well. She had some pain in her R upper trap after dry needling last session that has since resolved.   She was compliant with home exercise program.  Response to previous treatment: reduced neck tension   Functional change: none    Pain: 1/10  Location: right upper trap tightness     OBJECTIVE     Objective Measures updated at progress report unless specified.     Treatment     Xochitl received the treatments listed below:  (new treatments are indicated in bold)    therapeutic exercises to develop strength, endurance, ROM, and flexibility for 8 minutes including:    [x] UBE 3'fwd/3'bkwd Lvl 4  []    manual therapy techniques: Joint mobilizations, Manual traction, Myofacial release, and Soft tissue Mobilization were applied to the: cervical spine for 0   minutes, including:  []suboccipital release   [] PA C3-6 joint mobs in supine  [] manual cervical traction   [] STM R upper trap and cervical paraspinals in L sidelying         neuromuscular re-education activities to  improve: Coordination, Kinesthetic, Sense, Proprioception, and Posture for 24 minutes. The following activities were included:  [] cervical retractions x10   [x] cervical retractions with YTB x15   [x] iso cervical SB Ylw TB x10 B   [x] open books x10 B   [x] supine pec stretch 1/2 foam roller 2'  [x] 1/2 foam roller (supine) snow angels x12  [x] SA push up against mat x15     therapeutic activities to improve functional performance for 13  minutes, including:    [x] shoulder ER green TB x10 B  [x] straight arm pull downs green TB 3x10   [x] scap retractions with Blue TB 2x10   []    [x]Mechanical Static Cervical Traction performed for 8 min at 12# of force    [] Dry needling performed to B upper trap with (4) 40 mm needles with mechanical winding & Estim for 0 min.     Patient Education and Home Exercises     Home Exercises Provided and Patient Education Provided     Education provided:   - HEP  -Re-educated on home cervical traction devices   -dry needling benefits and side effects     Written Home Exercises Provided: Patient instructed to cont prior HEP. Exercises were reviewed and Xochitl was able to demonstrate them prior to the end of the session.  Xochitl demonstrated good  understanding of the education provided. See EMR under Patient Instructions for exercises provided during therapy sessions    ASSESSMENT     Pt tolerated session well. Added lawson-scap strengthening with resistance band and serratus stability training with push-ups against mat. Pt c/o minor R shoulder pain with added exercises, but no worse after. Progress as tolerated.     Xochitl Is progressing well towards her goals.   Pt prognosis is Fair.     Pt will continue to benefit from skilled outpatient physical therapy to address the deficits listed in the problem list box on initial evaluation, provide pt/family education and to maximize pt's level of independence in the home and community environment.     Pt's spiritual, cultural and  educational needs considered and pt agreeable to plan of care and goals.     Anticipated barriers to physical therapy: chronicity of pain, comorbidities     Goals: Short Term Goals: 6 visits  1. Pt will be (I) /c HEP to supplement PT in order to improve functional tasks  2. Pt will improve B cervical rotation range of motion >/= 45 deg for safety with driving  3. Pt will report reduction in morning headaches </= 4 days/week      Long Term Goals: 12 visits   1. Pt will improve B lawson-scaps MMTs to 4-/5 to improve strength for functional activities  2. Pt will improve FOTO score to </= 55% to reduce perceived pain with mobility   3. Pt will report improved ability to sleep without pain >/= 4 nights/week     PLAN     Plan of care Certification: 9/24/2024 to 12/24/24.     Outpatient Physical Therapy 1-2 times weekly for 12 visits to include the following interventions: Manual Therapy, Moist Heat/ Ice, Neuromuscular Re-ed, Patient Education, Self Care, Therapeutic Activities, and Therapeutic Exercise, E-stim and Dry Needling as needed.     Benita Contreras, PT

## 2024-10-24 ENCOUNTER — OFFICE VISIT (OUTPATIENT)
Dept: PAIN MEDICINE | Facility: CLINIC | Age: 69
End: 2024-10-24
Payer: MEDICARE

## 2024-10-24 VITALS
HEART RATE: 67 BPM | WEIGHT: 152.31 LBS | TEMPERATURE: 98 F | DIASTOLIC BLOOD PRESSURE: 76 MMHG | BODY MASS INDEX: 26.15 KG/M2 | SYSTOLIC BLOOD PRESSURE: 134 MMHG | OXYGEN SATURATION: 99 %

## 2024-10-24 DIAGNOSIS — M79.18 MYOFASCIAL PAIN: ICD-10-CM

## 2024-10-24 DIAGNOSIS — M54.6 THORACIC SPINE PAIN: ICD-10-CM

## 2024-10-24 DIAGNOSIS — M54.12 CERVICAL RADICULOPATHY: ICD-10-CM

## 2024-10-24 DIAGNOSIS — M47.812 CERVICAL SPONDYLOSIS: ICD-10-CM

## 2024-10-24 DIAGNOSIS — M54.14 THORACIC RADICULOPATHY: Primary | ICD-10-CM

## 2024-10-24 PROCEDURE — 3288F FALL RISK ASSESSMENT DOCD: CPT | Mod: CPTII,S$GLB,, | Performed by: NURSE PRACTITIONER

## 2024-10-24 PROCEDURE — 4010F ACE/ARB THERAPY RXD/TAKEN: CPT | Mod: CPTII,S$GLB,, | Performed by: NURSE PRACTITIONER

## 2024-10-24 PROCEDURE — 3008F BODY MASS INDEX DOCD: CPT | Mod: CPTII,S$GLB,, | Performed by: NURSE PRACTITIONER

## 2024-10-24 PROCEDURE — 1126F AMNT PAIN NOTED NONE PRSNT: CPT | Mod: CPTII,S$GLB,, | Performed by: NURSE PRACTITIONER

## 2024-10-24 PROCEDURE — 1159F MED LIST DOCD IN RCRD: CPT | Mod: CPTII,S$GLB,, | Performed by: NURSE PRACTITIONER

## 2024-10-24 PROCEDURE — 3078F DIAST BP <80 MM HG: CPT | Mod: CPTII,S$GLB,, | Performed by: NURSE PRACTITIONER

## 2024-10-24 PROCEDURE — 1160F RVW MEDS BY RX/DR IN RCRD: CPT | Mod: CPTII,S$GLB,, | Performed by: NURSE PRACTITIONER

## 2024-10-24 PROCEDURE — 1101F PT FALLS ASSESS-DOCD LE1/YR: CPT | Mod: CPTII,S$GLB,, | Performed by: NURSE PRACTITIONER

## 2024-10-24 PROCEDURE — 99999 PR PBB SHADOW E&M-EST. PATIENT-LVL III: CPT | Mod: PBBFAC,,, | Performed by: NURSE PRACTITIONER

## 2024-10-24 PROCEDURE — 3075F SYST BP GE 130 - 139MM HG: CPT | Mod: CPTII,S$GLB,, | Performed by: NURSE PRACTITIONER

## 2024-10-24 PROCEDURE — 99213 OFFICE O/P EST LOW 20 MIN: CPT | Mod: S$GLB,,, | Performed by: NURSE PRACTITIONER

## 2024-10-24 NOTE — PROGRESS NOTES
Chronic Patient Established Note         SUBJECTIVE:      Interval History 10/24/2024:  The patient returns to clinic today for follow up of neck and back pain. She is s/p C7/T1 IL SANJUANA on 10/10/2024. She reports 95% relief of her neck pain. She reports intermittent neck pain on the left. This is tolerable at this time. She is scheduled for thoracic injection next week. She continues to report mid back pain that is in between the shoulders blades that radiates into the right. She is performing a home exercise routine. She denies any other health changes. Her pain today is 0/10.    Interval History 10/2/2024:  The patient returns to clinic today for follow up of neck and mid back pain. She is here today for imaging review. She continues to report neck pain that radiates into the shoulder. She also notes headaches. She denies any radicular arm pain. Her pain is worse with turning her head and activity. She is performing a home exercise routine. She denies any other health changes. Her pain today is 2/10.     Interval History 9/13/2024:  The patient returns to clinic today for follow up of pain. She is s/p right T12/L1 and L1/2 TF SANJUANA on 8/29/2024. She reports 100% relief of her back pain. She reports increased neck pain. She describes this pain as pressure. She does endorse head pain at the base that radiates forward. She also reports pain that radiates into the shoulder blade and around the rib. Her pain is worse with activity. She denies any radicular arm pain. She continues to perform a home exercise routine. She denies any other health changes. Her pain today is 8/10.    Interval History 7/23/2024:  The patient returns to clinic today for follow up of pain via virtual visit. She reports increased right shoulder pain over the few days. She was reaching down to get something off the floor. She felt something give in her shoulder. This pain felt similar to her pain before rotator cuff surgery. She did take Flexeril  and 1/2 a Norco last night with benefit. The pain is much improved today. She also reports increased mid back pain over the last 2 months. She reports mid back pain that radiates into her ribs, right greater than left. She is performing home exercise. She denies any other health changes.     Interval History 4/3/2024:  Patient returns to clinic for follow up of neck and back pain. She is s/p TPI's on 2/8/24, which she reports has given her 100% percent relief for 2 months. Patient expresses that bilateral neck pain has returned + mid back pain. Patient expresses that with increased acitivity thorugh the day the muscles of the neck feel tighter. Patient expresses she continues to do home exercises + weights, and pickleball, feels like the exercises have not improved her pain.  Patient is not taking robaxin 500mg TID, she expresses taking flexeril at night as needed, and utilizes a heating pad. Patient states her current pain level is 4/10. Patient describes back pain has returned x 1 month, now is localized to the center of the back without radiation.     Interval History 3/5/2024:  The patient has a virtual follow up for neck and back pain. She is s/p TPIs for neck pain which she says helped 100% for that pain. Her primary complaint today is middle back pain. She had thoracic SANJUANA in the past but feels like this is slightly higher. However, she says that it is tolerable at this time. She completed PT today and says that she plans to keep up with her home exercises. Her overall pain today is 5/10.    Interval History 1/10/2024:  Rebecca Johnston Zollinger returns to clinic today s/p Right T12/L1 and L1/L2 TA SANJUANA on 12/18/2023.  She reports 85% relief that is ongoing.  She is back to walking 2 miles again.  She does find that when she exerts herself to her full extent that the pain returns slightly.  Today she continues to complain of neck pain from her previous visit and of new mid back pain.  She began making stained  "glass 3 years ago and flexes her head 2-3hrs/3-4 times per week and attributes some of the neck pain to this.  She states that the pain is located bilaterally at the base of her skull and into her neck, left greater than right.  She describes the pain as pressure and it feels like "a clamp". Sometimes she has a headache by the end of the day.  The mid back pain is occasionally present in the AM and is relieve by stretching.  It is exacerbated by aerobic activity and pickleball.  The pain will wrap around right side of ribs and under her breast. .  She has not started  PT yet-she was supposed to start yesterday, but the appointment was cancelled due to weather. Denies bowel and bladder dysfunction, fever, chills, nightsweats or weight changes. She continues Robaxin Pain today is 5/10.    She denies any new weakness, denies fevers, numbness, saddle anesthesia or incontinence.     Interval History 12/15/2023:  The patient returns to clinic today for follow up of back pain. She reports increased neck pain over the last month. She reports neck pain and pain at the base of her head, left side greater than right. This pain is worse with bending forward. She has tried Excedrin and Flexeril with limited relief. She denies any radicular arm pain. She continues to report right sided mid and low back pain. She does have radiating pain into the right groin and anterior thigh. She is scheduled for SANJUANA next week. She continues to perform a home exercise routine. She denies any other health changes. Her pain today is 8/10.     Interval History 11/21/2023:  The patient returns to clinic today for follow up of back pain via virtual visit. She is here today for imaging review. She reports worsened right sided mid and low back pain. This radiates into the right groin and anterior thigh. She denies any left leg pain. Her pain is worse with prolonged walking. She is performing a home exercise routine. She denies any other health changes. "     Interval History 8/28/2023:  Rebecca Johnston Zollinger presents to the clinic for a follow-up appointment for back pain via virtual visit. She is s/p right T12/L1 TF SANJUANA on 8/14/2023. She reports 100% relief of her mid back pain. She continues to report low back pain that radiates into her groin bilaterally, right greater than left. This is worse with prolonged walking. She is no longer able to walk 4 miles, which is her typical exercise routine. She has completed multiple rounds of PT. She denies any other health changes.       Original HPI:  67 year old female who presents with Right sided lower back pain. 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 The pain is located in her R back and goes to the R hip and groin when she walks. At worst it is a 10/10, baseline is a 3/10. She describes it as aching. She used to be able to walk 4 miles, now she states she can only walk 1 mile without pain. Last saw pain management at  2 years ago, no interventions performed. She has tried multiple rounds of PT, most recently in 2022 with some improvement, completed over 6 weeks of PT. She takes occasional ibuprofen and tylenol. Used to take tramadol and opioids but is no longer taking. She has had an MRI back in 2021 showing protruding disc at T12-L1. She denies any new weakness, denies fevers, numbness, saddle anesthesia or incontinence.     Pain Disability Index Review:      9/13/2024    10:08 AM 4/3/2024     8:09 AM 1/10/2024     7:32 AM   Last 3 PDI Scores   Pain Disability Index (PDI) 56 [phone]       Pain Medications:  None    Opioid Contract: not applicable     report:  Not applicable    Pain Procedures:   8/14/2023- Right T12/L1 TF SANJUANA  12/18/2023- Right T12/L1 and L1/2 TF SANJUANA- 85% relief  8/29/2024- Right T12/L1 and L1/2 TF SANJUANA- 100% relief  10/10/2024- C7/T1 IL SANJUANA- 95% relief     Physical Therapy/Home Exercise:  yes    Imaging:  MRI Cervical Spine 9/26/2024:  COMPARISON:  12/15/2023     FINDINGS:  Alignment: Normal.     Vertebrae: Normal marrow signal. No fracture.     Discs: Disc space narrowing present C5-C6-C7 with marginal osteophytosis     Cord: Normal.     Skull base and craniocervical junction: Normal.     Degenerative findings:     C2-C3: There is no focal disc herniation.No significant central canal narrowing.  No significant neural foraminal narrowing.     C3-C4: There is no focal disc herniation.No significant central canal narrowing.  Mild LEFT neural foraminal narrowing.     C4-C5: There is no focal disc herniation.No significant central canal narrowing.  Moderate LEFT neural foraminal narrowing.     C5-C6: Posterior marginal osteophytic disc spur complex.There is no focal disc herniation.No significant central canal narrowing.  Moderate-severe RIGHT mild LEFT neural foraminal narrowing.     C6-C7: Posterior marginal osteophytic disc spur complex.There is no focal disc herniation.No significant central canal narrowing.  Moderate-severe LEFT mild RIGHT neural foraminal narrowing.     C7-T1: There is no focal disc herniation.No significant central canal narrowing.  No significant neural foraminal narrowing.     Paraspinal muscles & soft tissues: Multinodular goiter with dominant nodule RIGHT thyroid lobe, hyperintense measuring 2.1 cm.  Thyroid ultrasound recommended..     Impression:     Multilevel lumbar spondylosis most evident C5-C7 disc space levels with marginal osteophytosis and uncovertebral hypertrophy resulting in associated neural foraminal encroachment as detailed above.     Multiple thyroid nodules largest of which of RIGHT thyroid lobe 2.1 cm.  Routine thyroid ultrasound recommended for further evaluation.    MRI Thoracic Spine 9/26/2024:  COMPARISON:  None.     FINDINGS:  Alignment: The thoracic spine demonstrates proper alignment.     Vertebra: The vertebral bodies show normal signal intensity and  height with no indication of acute fracture or pathologic marrow replacement process.  Suspect incidental hemangioma T10 with cortical endplate degenerative changes most evident T7-T8 through T9-T10     Disc: Multilevel disc space narrowing and desiccation with posterior marginal osteophytic spurring.     Cord: The demonstrated portion of the spinal cord is normal in signal intensity at all levels with no indication of myelomalacia or cord edema.     Evaluation of the surrounding soft tissue structures demonstrates no acute abnormality.  As noted in the cervical portion of the report, multiple nodules within the thyroid for which routine thyroid ultrasound recommended.     Degenerative findings: There is a extradural soft tissue density at the T12 level lateralizing RIGHT with mass effect upon the thecal sac.  This is associated with a RIGHT foraminal disc from the T12-L1 level.  These findings are identified on series 7 image 55 and series 7, image 53, confirmed on sagittal series 4, image 8. there is associated mass effect upon the thecal sac yet no evidence for cord deviation.     Impression:     RIGHT foraminal disc protrusion T12-L1 with extruded disc material RIGHT extradural T12 level      XR CERVICAL SPINE 5 VIEW WITH FLEX AND EXT-12/15/2023  COMPARISON:  None.     FINDINGS:  Vertebral body heights are maintained.     Disc space narrowing and endplate changes C5-6 and C6-7.  Small posterior osteophytes at these levels.     Oblique views show bony narrowing of the neural foramina C5-6 on the right and C4-5 and C5-6 on the left.     Reversal of the normal cervical lordosis in the lower cervical spine.  Otherwise, AP alignment appears anatomic.     No significant prevertebral soft tissue edema.     Impression:     Degenerative change as above.       MRI Lumbar Spine 9/22/2023:  COMPARISON:  None.     FINDINGS:  Alignment: Grade 1 retrolisthesis of L5-S1.  Straightening of lumbar lordosis.     Vertebrae:  Multilevel degenerative endplate changes no fracture or marrow infiltrative process.     Discs: Moderate to severe disc height loss noted throughout the lumbar spine.  No evidence for discitis.     Cord: Conus terminates at L1-L2 and appears unremarkable.  Cauda equina appears unremarkable.     Degenerative findings:     T11-T12: Right paracentral disc extrusion results in moderate effacement of the right lateral recess and mass effect upon the right T12 nerve root.     T12-L1: Circumferential disc bulge with right paracentral disc extrusion result in mild effacement of the right lateral recess.     L1-L2: Circumferential disc bulge and mild facet arthropathy result in mild bilateral neural foraminal narrowing.     L2-L3: Circumferential disc bulge and mild facet arthropathy result in mild left neural foraminal narrowing.     L3-L4: Circumferential disc bulge and moderate facet arthropathy result in mild spinal canal stenosis and mild bilateral neural foraminal narrowing.     L4-L5: Circumferential disc bulge and mild facet arthropathy result in mild effacement of the lateral recesses and mild bilateral neural foraminal narrowing peer     L5-S1: Circumferential disc bulge and mild facet arthropathy result in moderate right, mild left neural foraminal narrowing.     Paraspinal muscles & soft tissues: Moderate paraspinal muscle atrophy.  4.0 cm left renal cyst.     Impression:     1. Multilevel degenerative changes of the lumbar and lower thoracic spine as detailed above.     Xray Hip 7/5/2023:  FINDINGS:  No acute fractures.  Some degenerative changes visualized lower lumbar spine to include endplate osteophytes and lumbosacral disc narrowing and possible vacuum phenomenon.  Intact right and left SI joints.  No definite narrowing of right or left hip joint spaces or acetabular spurring.  Preserved right and left femoral head contours.     Impression:     As above    MRI 2021:  MRI of thoracic and lumbar spine  reviewed from 2021  Our interpretation: Tarlov cysts in sacral spine, Extruding disc T12-L1 R sided, disc osteophyte complex T9-T10, T10-11    Allergies: Review of patient's allergies indicates:  No Known Allergies    Current Medications:   Current Outpatient Medications   Medication Sig Dispense Refill    alendronate (FOSAMAX) 70 MG tablet Take 70 mg by mouth every 7 days.      aspirin (ECOTRIN) 81 MG EC tablet Take 81 mg by mouth once daily.      estradioL (ESTRACE) 0.01 % (0.1 mg/gram) vaginal cream Place vaginally once daily.      FLUoxetine 40 MG capsule Take 40 mg by mouth once daily.      hydrocodone-acetaminophen (HYCET) solution 7.5-325 mg/15mL Take by mouth 4 (four) times daily as needed for Pain.      ipratropium (ATROVENT) 42 mcg (0.06 %) nasal spray 2 sprays by Each Nostril route 4 (four) times daily.      tiZANidine (ZANAFLEX) 2 MG tablet 1 at bedtime for 3 nights then 2 every night for 3 nights then 3 every night to follow. Stay at the most comfortable dose. 90 tablet 2    irbesartan (AVAPRO) 150 MG tablet Take 150 mg by mouth every evening.       No current facility-administered medications for this visit.       REVIEW OF SYSTEMS:    GENERAL:  No weight loss, malaise or fevers.  HEENT:  Negative for frequent or significant headaches.  NECK:  Negative for lumps, goiter, pain and significant neck swelling.  RESPIRATORY:  Negative for cough, wheezing or shortness of breath.  CARDIOVASCULAR:  Negative for chest pain, leg swelling or palpitations. HTN  GI:  Negative for abdominal discomfort, blood in stools or black stools or change in bowel habits.  MUSCULOSKELETAL:  See HPI.  SKIN:  Negative for lesions, rash, and itching.  PSYCH:  Negative for sleep disturbance, mood disorder and recent psychosocial stressors.  HEMATOLOGY/LYMPHOLOGY:  Negative for prolonged bleeding, bruising easily or swollen nodes.  NEURO:   No history of headaches, syncope, paralysis, seizures or tremors.  All other reviewed and  negative other than HPI.    Past Medical History:  Past Medical History:   Diagnosis Date    Anticoagulant long-term use     Hypertension        Past Surgical History:  Past Surgical History:   Procedure Laterality Date    EPIDURAL STEROID INJECTION N/A 10/10/2024    Procedure: CERVICAL C7/T1 IL SANJUANA *ASPIRIN OTC* HOLD FOR 5 DAYS  **JENNIFERSA PT**;  Surgeon: Devon Gamble MD;  Location: [facility];  Service: Pain Management;  Laterality: N/A;  [phone]  2 WK F/U DEEPALI    TRANSFORAMINAL EPIDURAL INJECTION OF STEROID Right 8/14/2023    Procedure: INJECTION, STEROID, EPIDURAL, TRANSFORAMINAL APPROACH, RIGHT T12/L1 SOONER DATE;  Surgeon: Isi Womack MD;  Location: [facility];  Service: Pain Management;  Laterality: Right;    TRANSFORAMINAL EPIDURAL INJECTION OF STEROID Right 12/18/2023    Procedure: LUMBAR TRANSFORAMINAL RIGHT T12/L1 AND L1/2;  Surgeon: Isi Womack MD;  Location: [facility];  Service: Pain Management;  Laterality: Right;  [phone]  2 WK F/U DEEPALI    TRANSFORAMINAL EPIDURAL INJECTION OF STEROID Right 8/29/2024    Procedure: THORACIC TRANSFORAMINAL RIGHT T12/L1 AND L1/2 *ASPIRIN OTC* HOLD FOR 5 DAYS;  Surgeon: Isi Womack MD;  Location: [facility];  Service: Pain Management;  Laterality: Right;  [phone]  2 WK F/U DEEPALI       Family History:  No family history on file.    Social History:  Social History     Socioeconomic History    Marital status:    Tobacco Use    Smoking status: Former     Types: Cigarettes     Passive exposure: Past    Smokeless tobacco: Never    Tobacco comments:     Quit 20 years ago   Substance and Sexual Activity    Alcohol use: Not Currently    Drug use: Never    Sexual activity: Not Currently     Social Drivers of Health     Financial Resource Strain: Low Risk  (10/9/2024)    Received from [facility]    Overall Financial Resource Strain (CARDIA)     Difficulty of Paying Living Expenses: Not hard at all   Food Insecurity: No Food Insecurity (10/9/2024)     Received from [facility]    Hunger Vital Sign     Worried About Running Out of Food in the Last Year: Never true     Ran Out of Food in the Last Year: Never true   Transportation Needs: No Transportation Needs (10/9/2024)    Received from [facility]    PRAPARE - Transportation     Lack of Transportation (Medical): No     Lack of Transportation (Non-Medical): No   Physical Activity: Sufficiently Active (10/9/2024)    Received from [facility]    Exercise Vital Sign     Days of Exercise per Week: 5 days     Minutes of Exercise per Session: 60 min   Stress: Stress Concern Present (10/9/2024)    Received from [facility]    [employer] [place] [employer] - Occupational Stress Questionnaire     Feeling of Stress : To some extent   Housing Stability: Unknown (10/9/2024)    Received from [facility]    Housing Stability Vital Sign     Unable to Pay for Housing in the Last Year: No       OBJECTIVE:    Vitals:    10/24/24 1514   BP: 134/76   Pulse: 67   Temp: 97.5 °F (36.4 °C)   SpO2: 99%   Weight: 69.1 kg (152 lb 5.4 oz)   PainSc: 0-No pain       PHYSICAL EXAMINATION:    General appearance: Well appearing, in no acute distress, alert and oriented x3.  Psych:  Mood and affect appropriate.  Skin: Skin color, texture, turgor normal, no rashes or lesions, in both upper and lower body.  Head/face:  Atraumatic, normocephalic.    Cor: Capillary refill <3 seconds.  Pulm: Symmetric chest rise, no respiratory distress noted.   Back:  There is pain with palpation over thoracic facet joints at approximated level of T8-9 Normal ROM   Extremities: No deformities, edema, or skin discoloration.   Musculoskeletal:  Bilateral upper extremity strength is normal and symmetric.  No atrophy or tone abnormalities are noted.  Neuro:  No loss of sensation is noted.    Gait: Normal.    ASSESSMENT: 68 y.o. year old female with back and neck pain, consistent with the followin. Thoracic radiculopathy        2. Thoracic spine pain         3. Cervical radiculopathy        4. Cervical spondylosis        5. Myofascial pain            PLAN:     - Previous imaging reviewed.     - She is s/p C7/T1 IL SANJUANA with benefit.     - She is scheduled for thoracic SANJUANA next week.     - Continue home exercise routine.    - RTC 2 weeks after above procedure.     The above plan and management options were discussed at length with patient. Patient is in agreement with the above and verbalized understanding.    Maggy Weeks NP  10/24/2024

## 2024-10-29 ENCOUNTER — CLINICAL SUPPORT (OUTPATIENT)
Dept: REHABILITATION | Facility: HOSPITAL | Age: 69
End: 2024-10-29
Payer: MEDICARE

## 2024-10-29 DIAGNOSIS — M79.18 MYOFASCIAL PAIN: ICD-10-CM

## 2024-10-29 DIAGNOSIS — R29.898 DECREASED ROM OF NECK: Primary | ICD-10-CM

## 2024-10-29 DIAGNOSIS — M53.84 DECREASED ROM OF THORACIC SPINE: ICD-10-CM

## 2024-10-29 PROCEDURE — 97112 NEUROMUSCULAR REEDUCATION: CPT | Performed by: PHYSICAL THERAPIST

## 2024-10-29 PROCEDURE — 97530 THERAPEUTIC ACTIVITIES: CPT | Performed by: PHYSICAL THERAPIST

## 2024-10-29 PROCEDURE — 97110 THERAPEUTIC EXERCISES: CPT | Performed by: PHYSICAL THERAPIST

## 2024-10-29 PROCEDURE — 97012 MECHANICAL TRACTION THERAPY: CPT | Performed by: PHYSICAL THERAPIST

## 2024-11-04 ENCOUNTER — HOSPITAL ENCOUNTER (OUTPATIENT)
Facility: OTHER | Age: 69
Discharge: HOME | End: 2024-11-04
Attending: ANESTHESIOLOGY | Admitting: ANESTHESIOLOGY
Payer: MEDICARE

## 2024-11-04 VITALS
TEMPERATURE: 98 F | OXYGEN SATURATION: 96 % | SYSTOLIC BLOOD PRESSURE: 132 MMHG | HEART RATE: 60 BPM | HEIGHT: 64 IN | WEIGHT: 152 LBS | BODY MASS INDEX: 25.95 KG/M2 | RESPIRATION RATE: 18 BRPM | DIASTOLIC BLOOD PRESSURE: 71 MMHG

## 2024-11-04 DIAGNOSIS — G89.29 CHRONIC PAIN: ICD-10-CM

## 2024-11-04 DIAGNOSIS — M54.14 THORACIC RADICULOPATHY: Primary | ICD-10-CM

## 2024-11-04 PROCEDURE — 63600175 PHARM REV CODE 636 W HCPCS: Performed by: ANESTHESIOLOGY

## 2024-11-04 PROCEDURE — 64479 NJX AA&/STRD TFRM EPI C/T 1: CPT | Mod: RT,,, | Performed by: ANESTHESIOLOGY

## 2024-11-04 PROCEDURE — 64479 NJX AA&/STRD TFRM EPI C/T 1: CPT | Mod: RT | Performed by: ANESTHESIOLOGY

## 2024-11-04 PROCEDURE — 25500020 PHARM REV CODE 255: Performed by: ANESTHESIOLOGY

## 2024-11-04 RX ORDER — SODIUM CHLORIDE 9 MG/ML
INJECTION, SOLUTION INTRAVENOUS CONTINUOUS
Status: DISCONTINUED | OUTPATIENT
Start: 2024-11-04 | End: 2024-11-04 | Stop reason: HOSPADM

## 2024-11-04 RX ORDER — ONDANSETRON HYDROCHLORIDE 2 MG/ML
4 INJECTION, SOLUTION INTRAVENOUS ONCE
Status: DISCONTINUED | OUTPATIENT
Start: 2024-11-04 | End: 2024-11-04 | Stop reason: HOSPADM

## 2024-11-04 RX ORDER — MIDAZOLAM HYDROCHLORIDE 1 MG/ML
INJECTION INTRAMUSCULAR; INTRAVENOUS
Status: DISCONTINUED | OUTPATIENT
Start: 2024-11-04 | End: 2024-11-04 | Stop reason: HOSPADM

## 2024-11-04 RX ORDER — DEXAMETHASONE SODIUM PHOSPHATE 10 MG/ML
INJECTION INTRAMUSCULAR; INTRAVENOUS
Status: DISCONTINUED | OUTPATIENT
Start: 2024-11-04 | End: 2024-11-04 | Stop reason: HOSPADM

## 2024-11-04 RX ORDER — LIDOCAINE HYDROCHLORIDE 10 MG/ML
INJECTION, SOLUTION EPIDURAL; INFILTRATION; INTRACAUDAL; PERINEURAL
Status: DISCONTINUED | OUTPATIENT
Start: 2024-11-04 | End: 2024-11-04 | Stop reason: HOSPADM

## 2024-11-04 RX ORDER — LIDOCAINE HYDROCHLORIDE 20 MG/ML
INJECTION, SOLUTION INFILTRATION; PERINEURAL
Status: DISCONTINUED | OUTPATIENT
Start: 2024-11-04 | End: 2024-11-04 | Stop reason: HOSPADM

## 2024-11-04 NOTE — H&P
"HPI  Patient presenting for Procedure(s) (LRB):  THORACIC TRANSFORAMINAL RIGHT T7/8 *ASPIRIN OTC* HOLD FOR 5 DAYS (Right)     Patient on Anti-coagulation No    No health changes since previous encounter    Past Medical History:   Diagnosis Date    Anticoagulant long-term use     Hypertension      Past Surgical History:   Procedure Laterality Date    EPIDURAL STEROID INJECTION N/A 10/10/2024    Procedure: CERVICAL C7/T1 IL SANJUANA *ASPIRIN OTC* HOLD FOR 5 DAYS  **JENNIFERSA PT**;  Surgeon: Devon Gamble MD;  Location: [facility];  Service: Pain Management;  Laterality: N/A;  [phone]  2 WK F/U DEEPALI    TRANSFORAMINAL EPIDURAL INJECTION OF STEROID Right 8/14/2023    Procedure: INJECTION, STEROID, EPIDURAL, TRANSFORAMINAL APPROACH, RIGHT T12/L1 SOONER DATE;  Surgeon: Isi Womack MD;  Location: [facility];  Service: Pain Management;  Laterality: Right;    TRANSFORAMINAL EPIDURAL INJECTION OF STEROID Right 12/18/2023    Procedure: LUMBAR TRANSFORAMINAL RIGHT T12/L1 AND L1/2;  Surgeon: Isi Womack MD;  Location: [facility];  Service: Pain Management;  Laterality: Right;  [phone]  2 WK F/U DEEPALI    TRANSFORAMINAL EPIDURAL INJECTION OF STEROID Right 8/29/2024    Procedure: THORACIC TRANSFORAMINAL RIGHT T12/L1 AND L1/2 *ASPIRIN OTC* HOLD FOR 5 DAYS;  Surgeon: Isi Womack MD;  Location: [facility];  Service: Pain Management;  Laterality: Right;  [phone]  2 WK F/U DEEPALI     Review of patient's allergies indicates:  No Known Allergies   Current Facility-Administered Medications   Medication    0.9%  NaCl infusion       PMHx, PSHx, Allergies, Medications reviewed in epic    ROS negative except pain complaints in HPI    OBJECTIVE:    BP (!) 150/67   Pulse 61   Temp 98.1 °F (36.7 °C) (Oral)   Resp 16   Ht 5' 4" (1.626 m)   Wt 68.9 kg (152 lb)   SpO2 95%   Breastfeeding No   BMI 26.09 kg/m²     PHYSICAL EXAMINATION:    GENERAL: Well appearing, in no acute distress, alert and oriented x3.  PSYCH:  Mood and " affect appropriate.  SKIN: Skin color, texture, turgor normal, no rashes or lesions which will impact the procedure.  CV: RRR with palpation of the radial artery.  PULM: No evidence of respiratory difficulty, symmetric chest rise. Clear to auscultation.  NEURO: Cranial nerves grossly intact.    Plan:    Proceed with procedure as planned Procedure(s) (LRB):  THORACIC TRANSFORAMINAL RIGHT T7/8 *ASPIRIN OTC* HOLD FOR 5 DAYS (Right)    VALERIE Arredondo  11/04/2024

## 2024-11-04 NOTE — OP NOTE
Thoracic Transforaminal Epidural Steroid Injection  Right  T7/8 under Fluoroscopic Guidance    The procedure, risks, benefits, and options were discussed with the patient. There are no contraindications to the procedure. The patent expressed understanding and agreed to the procedure. Informed written consent was obtained prior to the start of the procedure and can be found in the patient's chart.    PATIENT NAME: Rebecca Zollinger   MRN: [id number]     DATE OF PROCEDURE: 11/04/2024    PROCEDURE: Thoracic Transforaminal Epidural Steroid Injection Right  T7/8 under Fluoroscopic Guidance    PRE-OP DIAGNOSIS: Thoracic radiculopathy [M54.14] Thoracic radiculopathy [M54.14]    POST-OP DIAGNOSIS: Same    PHYSICIAN: Isi Womack MD    ASSISTANTS: MAKI Arredondo DO  Pain Fellow [employer] 2024      MEDICATIONS INJECTED: Preservative-free Decadron 10mg with 1cc of Lidocaine 1% MPF     LOCAL ANESTHETIC INJECTED: Xylocaine 2%     SEDATION: Versed 2mg and Fentanyl 0mcg                                                                                                                                                                                     Conscious sedation ordered by M.D. Patient re-evaluation prior to administration of conscious sedation. No changes noted in patient's status from initial evaluation. The patient's vital signs were monitored by RN and patient remained hemodynamically stable throughout the procedure.    Event Time In   Sedation Start 0955   Sedation End 1004       ESTIMATED BLOOD LOSS: None    COMPLICATIONS: None    TECHNIQUE: Time-out was performed to identify the patient and procedure to be performed. With the patient laying in the prone position, the surgical area was prepped and draped in the usual sterile fashion using ChloraPrep and a fenestrated drape. The levels were determined under fluoroscopy guidance. Skin anesthesia was achieved by injecting Lidocaine 2% over the injection sites. The transforaminal  spaces were then approached with a 25 gauge, 3.5 inch spinal quinke needle that was introduced under fluoroscopic guidance in the AP and Lateral views. Once the needle tip was in the area of the transforaminal space, and there was no blood, CSF or paraesthesias, contrast dye Omnipaque (300mg/mL) was injected to confirm placement and there was no vascular runoff. Fluoroscopic imaging in the AP and lateral views revealed a clear outline of the spinal nerve with proximal spread of agent through the neural foramen into the epidural space. 3 mL of the medication mixture listed above was injected slowly. Displacement of the radio opaque contrast after injection of the medication confirmed that the medication went into the area of the transforaminal spaces. The needles were removed and bleeding was nil. A sterile dressing was applied. No specimens collected. The patient tolerated the procedure well.     PRE-PROCEDURE PAIN SCORE: 10/10    POST-PROCEDURE PAIN SCORE: 1/10    The patient was monitored after the procedure in the recovery area. They were given post-procedure and discharge instructions to follow at home. The patient was discharged in a stable condition.        Isi Womack MD

## 2024-11-04 NOTE — DISCHARGE INSTRUCTIONS

## 2024-11-04 NOTE — DISCHARGE SUMMARY
Discharge Note  Short Stay      SUMMARY     Admit Date: 11/4/2024    Attending Physician: Isi Womack      Discharge Physician: Isi Womack      Discharge Date: 11/4/2024 9:54 AM    Procedure(s) (LRB):  THORACIC TRANSFORAMINAL RIGHT T7/8 *ASPIRIN OTC* HOLD FOR 5 DAYS (Right)    Final Diagnosis: Thoracic radiculopathy [M54.14]    Disposition: Home or self care    Patient Instructions:   Current Discharge Medication List        CONTINUE these medications which have NOT CHANGED    Details   alendronate (FOSAMAX) 70 MG tablet Take 70 mg by mouth every 7 days.      aspirin (ECOTRIN) 81 MG EC tablet Take 81 mg by mouth once daily.      estradioL (ESTRACE) 0.01 % (0.1 mg/gram) vaginal cream Place vaginally once daily.      FLUoxetine 40 MG capsule Take 40 mg by mouth once daily.      hydrocodone-acetaminophen (HYCET) solution 7.5-325 mg/15mL Take by mouth 4 (four) times daily as needed for Pain.      ipratropium (ATROVENT) 42 mcg (0.06 %) nasal spray 2 sprays by Each Nostril route 4 (four) times daily.      tiZANidine (ZANAFLEX) 2 MG tablet 1 at bedtime for 3 nights then 2 every night for 3 nights then 3 every night to follow. Stay at the most comfortable dose.  Qty: 90 tablet, Refills: 2                 Discharge Diagnosis: Thoracic radiculopathy [M54.14]  Condition on Discharge: Stable with no complications to procedure   Diet on Discharge: Same as before.  Activity: as per instruction sheet.  Discharge to: Home with a responsible adult.  Follow up: 2-4 weeks       Please call my office or pager at [phone] if experienced any weakness or loss of sensation, fever > 101.5, pain uncontrolled with oral medications, persistent nausea/vomiting/or diarrhea, redness or drainage from the incisions, or any other worrisome concerns. If physician on call was not reached or could not communicate with our office for any reason please go to the nearest emergency department

## 2024-11-13 ENCOUNTER — TELEPHONE (OUTPATIENT)
Dept: SPORTS MEDICINE | Facility: CLINIC | Age: 69
End: 2024-11-13
Payer: MEDICARE

## 2024-11-13 NOTE — TELEPHONE ENCOUNTER
----- Message from Sarbjit Bain sent at 11/13/2024 10:46 AM CST -----  Regarding: sooner appt  Contact: pt [phone]  Type:  Sooner Apoointment Request    Caller is requesting a sooner appointment.  Caller declined first available appointment listed below.  Caller will not accept being placed on the waitlist and is requesting a message be sent to doctor.  Name of Caller Xochitl   When is the first available appointment? Dec 4th   Symptoms: Injection for right shoulder pain   Would the patient rather a call back or a response via [facility]? Call back   Best Call Back Number:pt [phone]   Additional Information:

## 2024-11-19 ENCOUNTER — OFFICE VISIT (OUTPATIENT)
Dept: PAIN MEDICINE | Facility: CLINIC | Age: 69
End: 2024-11-19
Payer: MEDICARE

## 2024-11-19 VITALS
DIASTOLIC BLOOD PRESSURE: 73 MMHG | BODY MASS INDEX: 27.97 KG/M2 | WEIGHT: 162.94 LBS | HEART RATE: 66 BPM | SYSTOLIC BLOOD PRESSURE: 134 MMHG

## 2024-11-19 DIAGNOSIS — M47.812 CERVICAL SPONDYLOSIS: ICD-10-CM

## 2024-11-19 DIAGNOSIS — M79.18 MYOFASCIAL PAIN: ICD-10-CM

## 2024-11-19 DIAGNOSIS — M54.12 CERVICAL RADICULOPATHY: ICD-10-CM

## 2024-11-19 DIAGNOSIS — M54.6 THORACIC SPINE PAIN: ICD-10-CM

## 2024-11-19 DIAGNOSIS — M54.14 THORACIC RADICULOPATHY: Primary | ICD-10-CM

## 2024-11-19 DIAGNOSIS — M54.16 LUMBAR RADICULOPATHY: ICD-10-CM

## 2024-11-19 PROCEDURE — 3008F BODY MASS INDEX DOCD: CPT | Mod: CPTII,S$GLB,, | Performed by: NURSE PRACTITIONER

## 2024-11-19 PROCEDURE — 3075F SYST BP GE 130 - 139MM HG: CPT | Mod: CPTII,S$GLB,, | Performed by: NURSE PRACTITIONER

## 2024-11-19 PROCEDURE — 1125F AMNT PAIN NOTED PAIN PRSNT: CPT | Mod: CPTII,S$GLB,, | Performed by: NURSE PRACTITIONER

## 2024-11-19 PROCEDURE — 1160F RVW MEDS BY RX/DR IN RCRD: CPT | Mod: CPTII,S$GLB,, | Performed by: NURSE PRACTITIONER

## 2024-11-19 PROCEDURE — 4010F ACE/ARB THERAPY RXD/TAKEN: CPT | Mod: CPTII,S$GLB,, | Performed by: NURSE PRACTITIONER

## 2024-11-19 PROCEDURE — 1159F MED LIST DOCD IN RCRD: CPT | Mod: CPTII,S$GLB,, | Performed by: NURSE PRACTITIONER

## 2024-11-19 PROCEDURE — 3078F DIAST BP <80 MM HG: CPT | Mod: CPTII,S$GLB,, | Performed by: NURSE PRACTITIONER

## 2024-11-19 PROCEDURE — 1101F PT FALLS ASSESS-DOCD LE1/YR: CPT | Mod: CPTII,S$GLB,, | Performed by: NURSE PRACTITIONER

## 2024-11-19 PROCEDURE — 99213 OFFICE O/P EST LOW 20 MIN: CPT | Mod: S$GLB,,, | Performed by: NURSE PRACTITIONER

## 2024-11-19 PROCEDURE — 99999 PR PBB SHADOW E&M-EST. PATIENT-LVL III: CPT | Mod: PBBFAC,,, | Performed by: NURSE PRACTITIONER

## 2024-11-19 PROCEDURE — 3288F FALL RISK ASSESSMENT DOCD: CPT | Mod: CPTII,S$GLB,, | Performed by: NURSE PRACTITIONER

## 2024-11-19 NOTE — PROGRESS NOTES
Chronic Patient Established Note         SUBJECTIVE:    Interval History 11/19/2024:  The patient returns to clinic today for follow up of neck and back pain. She is s/p right T7/8 TF SANJUANA on 11/4/2024. She reports 100% relief of her pain. Her rib pain has resolved. She reports intermittent mid back pain lower than injection site. This is tolerable at this time. She is performing a home exercise routine. She denies any other health changes. Her pain today is 2/10.    Interval History 10/24/2024:  The patient returns to clinic today for follow up of neck and back pain. She is s/p C7/T1 IL SANJUANA on 10/10/2024. She reports 95% relief of her neck pain. She reports intermittent neck pain on the left. This is tolerable at this time. She is scheduled for thoracic injection next week. She continues to report mid back pain that is in between the shoulders blades that radiates into the right. She is performing a home exercise routine. She denies any other health changes. Her pain today is 0/10.    Interval History 10/2/2024:  The patient returns to clinic today for follow up of neck and mid back pain. She is here today for imaging review. She continues to report neck pain that radiates into the shoulder. She also notes headaches. She denies any radicular arm pain. Her pain is worse with turning her head and activity. She is performing a home exercise routine. She denies any other health changes. Her pain today is 2/10.     Interval History 9/13/2024:  The patient returns to clinic today for follow up of pain. She is s/p right T12/L1 and L1/2 TF SANJUANA on 8/29/2024. She reports 100% relief of her back pain. She reports increased neck pain. She describes this pain as pressure. She does endorse head pain at the base that radiates forward. She also reports pain that radiates into the shoulder blade and around the rib. Her pain is worse with activity. She denies any radicular arm pain. She continues to perform a home exercise routine.  She denies any other health changes. Her pain today is 8/10.    Interval History 7/23/2024:  The patient returns to clinic today for follow up of pain via virtual visit. She reports increased right shoulder pain over the few days. She was reaching down to get something off the floor. She felt something give in her shoulder. This pain felt similar to her pain before rotator cuff surgery. She did take Flexeril and 1/2 a Norco last night with benefit. The pain is much improved today. She also reports increased mid back pain over the last 2 months. She reports mid back pain that radiates into her ribs, right greater than left. She is performing home exercise. She denies any other health changes.     Interval History 4/3/2024:  Patient returns to clinic for follow up of neck and back pain. She is s/p TPI's on 2/8/24, which she reports has given her 100% percent relief for 2 months. Patient expresses that bilateral neck pain has returned + mid back pain. Patient expresses that with increased acitivity thorugh the day the muscles of the neck feel tighter. Patient expresses she continues to do home exercises + weights, and pickleball, feels like the exercises have not improved her pain.  Patient is not taking robaxin 500mg TID, she expresses taking flexeril at night as needed, and utilizes a heating pad. Patient states her current pain level is 4/10. Patient describes back pain has returned x 1 month, now is localized to the center of the back without radiation.     Interval History 3/5/2024:  The patient has a virtual follow up for neck and back pain. She is s/p TPIs for neck pain which she says helped 100% for that pain. Her primary complaint today is middle back pain. She had thoracic SANJUANA in the past but feels like this is slightly higher. However, she says that it is tolerable at this time. She completed PT today and says that she plans to keep up with her home exercises. Her overall pain today is 5/10.    Interval  "History 1/10/2024:  Rebecca Johnston Zollinger returns to clinic today s/p Right T12/L1 and L1/L2 TA SANJUANA on 12/18/2023.  She reports 85% relief that is ongoing.  She is back to walking 2 miles again.  She does find that when she exerts herself to her full extent that the pain returns slightly.  Today she continues to complain of neck pain from her previous visit and of new mid back pain.  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  She states that the pain is located bilaterally at the base of her skull and into her neck, left greater than right.  She describes the pain as pressure and it feels like "a clamp". Sometimes she has a headache by the end of the day.  The mid back pain is occasionally present in the AM and is relieve by stretching.  It is exacerbated by aerobic activity and pickleball.  The pain will wrap around right side of ribs and under her breast. .  She has not started  PT yet-she was supposed to start yesterday, but the appointment was cancelled due to weather. Denies bowel and bladder dysfunction, fever, chills, nightsweats or weight changes. She continues Robaxin Pain today is 5/10.    She denies any new weakness, denies fevers, numbness, saddle anesthesia or incontinence.     Interval History 12/15/2023:  The patient returns to clinic today for follow up of back pain. She reports increased neck pain over the last month. She reports neck pain and pain at the base of her head, left side greater than right. This pain is worse with bending forward. She has tried Excedrin and Flexeril with limited relief. She denies any radicular arm pain. She continues to report right sided mid and low back pain. She does have radiating pain into the right groin and anterior thigh. She is scheduled for SANJUANA next week. She continues to perform a home exercise routine. She denies any other health changes. Her pain today is 8/10. "     Interval History 11/21/2023:  The patient returns to clinic today for follow up of back pain via virtual visit. She is here today for imaging review. She reports worsened right sided mid and low back pain. This radiates into the right groin and anterior thigh. She denies any left leg pain. Her pain is worse with prolonged walking. She is performing a home exercise routine. She denies any other health changes.     Interval History 8/28/2023:  Rebecca Johnston Zollinger presents to the clinic for a follow-up appointment for back pain via virtual visit. She is s/p right T12/L1 TF SANJUANA on 8/14/2023. She reports 100% relief of her mid back pain. She continues to report low back pain that radiates into her groin bilaterally, right greater than left. This is worse with prolonged walking. She is no longer able to walk 4 miles, which is her typical exercise routine. She has completed multiple rounds of PT. She denies any other health changes.       Original HPI:  67 year old female who presents with Right sided lower back pain. 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 The pain is located in her R back and goes to the R hip and groin when she walks. At worst it is a 10/10, baseline is a 3/10. She describes it as aching. She used to be able to walk 4 miles, now she states she can only walk 1 mile without pain. Last saw pain management at  2 years ago, no interventions performed. She has tried multiple rounds of PT, most recently in 2022 with some improvement, completed over 6 weeks of PT. She takes occasional ibuprofen and tylenol. Used to take tramadol and opioids but is no longer taking. She has had an MRI back in 2021 showing protruding disc at T12-L1. She denies any new weakness, denies fevers, numbness, saddle anesthesia or incontinence.     Pain Disability Index Review:      11/19/2024     2:21 PM 9/13/2024    10:08 AM 4/3/2024      8:09 AM   Last 3 PDI Scores   Pain Disability Index (PDI) 14 [phone] 63       Pain Medications:  None    Opioid Contract: not applicable     report:  Not applicable    Pain Procedures:   8/14/2023- Right T12/L1 TF SANJUANA  12/18/2023- Right T12/L1 and L1/2 TF SANJUANA- 85% relief  8/29/2024- Right T12/L1 and L1/2 TF SANJUANA- 100% relief  10/10/2024- C7/T1 IL SANJUANA- 95% relief   11/4/2024- Right T7/8 TF SANJUANA- 100% relief    Physical Therapy/Home Exercise: yes    Imaging:  MRI Cervical Spine 9/26/2024:  COMPARISON:  12/15/2023     FINDINGS:  Alignment: Normal.     Vertebrae: Normal marrow signal. No fracture.     Discs: Disc space narrowing present C5-C6-C7 with marginal osteophytosis     Cord: Normal.     Skull base and craniocervical junction: Normal.     Degenerative findings:     C2-C3: There is no focal disc herniation.No significant central canal narrowing.  No significant neural foraminal narrowing.     C3-C4: There is no focal disc herniation.No significant central canal narrowing.  Mild LEFT neural foraminal narrowing.     C4-C5: There is no focal disc herniation.No significant central canal narrowing.  Moderate LEFT neural foraminal narrowing.     C5-C6: Posterior marginal osteophytic disc spur complex.There is no focal disc herniation.No significant central canal narrowing.  Moderate-severe RIGHT mild LEFT neural foraminal narrowing.     C6-C7: Posterior marginal osteophytic disc spur complex.There is no focal disc herniation.No significant central canal narrowing.  Moderate-severe LEFT mild RIGHT neural foraminal narrowing.     C7-T1: There is no focal disc herniation.No significant central canal narrowing.  No significant neural foraminal narrowing.     Paraspinal muscles & soft tissues: Multinodular goiter with dominant nodule RIGHT thyroid lobe, hyperintense measuring 2.1 cm.  Thyroid ultrasound recommended..     Impression:     Multilevel lumbar spondylosis most evident C5-C7 disc space levels with marginal  osteophytosis and uncovertebral hypertrophy resulting in associated neural foraminal encroachment as detailed above.     Multiple thyroid nodules largest of which of RIGHT thyroid lobe 2.1 cm.  Routine thyroid ultrasound recommended for further evaluation.    MRI Thoracic Spine 9/26/2024:  COMPARISON:  None.     FINDINGS:  Alignment: The thoracic spine demonstrates proper alignment.     Vertebra: The vertebral bodies show normal signal intensity and height with no indication of acute fracture or pathologic marrow replacement process.  Suspect incidental hemangioma T10 with cortical endplate degenerative changes most evident T7-T8 through T9-T10     Disc: Multilevel disc space narrowing and desiccation with posterior marginal osteophytic spurring.     Cord: The demonstrated portion of the spinal cord is normal in signal intensity at all levels with no indication of myelomalacia or cord edema.     Evaluation of the surrounding soft tissue structures demonstrates no acute abnormality.  As noted in the cervical portion of the report, multiple nodules within the thyroid for which routine thyroid ultrasound recommended.     Degenerative findings: There is a extradural soft tissue density at the T12 level lateralizing RIGHT with mass effect upon the thecal sac.  This is associated with a RIGHT foraminal disc from the T12-L1 level.  These findings are identified on series 7 image 55 and series 7, image 53, confirmed on sagittal series 4, image 8. there is associated mass effect upon the thecal sac yet no evidence for cord deviation.     Impression:     RIGHT foraminal disc protrusion T12-L1 with extruded disc material RIGHT extradural T12 level      XR CERVICAL SPINE 5 VIEW WITH FLEX AND EXT-12/15/2023  COMPARISON:  None.     FINDINGS:  Vertebral body heights are maintained.     Disc space narrowing and endplate changes C5-6 and C6-7.  Small posterior osteophytes at these levels.     Oblique views show bony narrowing of the  neural foramina C5-6 on the right and C4-5 and C5-6 on the left.     Reversal of the normal cervical lordosis in the lower cervical spine.  Otherwise, AP alignment appears anatomic.     No significant prevertebral soft tissue edema.     Impression:     Degenerative change as above.       MRI Lumbar Spine 9/22/2023:  COMPARISON:  None.     FINDINGS:  Alignment: Grade 1 retrolisthesis of L5-S1.  Straightening of lumbar lordosis.     Vertebrae: Multilevel degenerative endplate changes no fracture or marrow infiltrative process.     Discs: Moderate to severe disc height loss noted throughout the lumbar spine.  No evidence for discitis.     Cord: Conus terminates at L1-L2 and appears unremarkable.  Cauda equina appears unremarkable.     Degenerative findings:     T11-T12: Right paracentral disc extrusion results in moderate effacement of the right lateral recess and mass effect upon the right T12 nerve root.     T12-L1: Circumferential disc bulge with right paracentral disc extrusion result in mild effacement of the right lateral recess.     L1-L2: Circumferential disc bulge and mild facet arthropathy result in mild bilateral neural foraminal narrowing.     L2-L3: Circumferential disc bulge and mild facet arthropathy result in mild left neural foraminal narrowing.     L3-L4: Circumferential disc bulge and moderate facet arthropathy result in mild spinal canal stenosis and mild bilateral neural foraminal narrowing.     L4-L5: Circumferential disc bulge and mild facet arthropathy result in mild effacement of the lateral recesses and mild bilateral neural foraminal narrowing peer     L5-S1: Circumferential disc bulge and mild facet arthropathy result in moderate right, mild left neural foraminal narrowing.     Paraspinal muscles & soft tissues: Moderate paraspinal muscle atrophy.  4.0 cm left renal cyst.     Impression:     1. Multilevel degenerative changes of the lumbar and lower thoracic spine as detailed above.     Xray  Hip 7/5/2023:  FINDINGS:  No acute fractures.  Some degenerative changes visualized lower lumbar spine to include endplate osteophytes and lumbosacral disc narrowing and possible vacuum phenomenon.  Intact right and left SI joints.  No definite narrowing of right or left hip joint spaces or acetabular spurring.  Preserved right and left femoral head contours.     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      aspirin (ECOTRIN) 81 MG EC tablet Take 81 mg by mouth once daily.      estradioL (ESTRACE) 0.01 % (0.1 mg/gram) vaginal cream Place vaginally once daily.      FLUoxetine 40 MG capsule Take 40 mg by mouth once daily.      hydrocodone-acetaminophen (HYCET) solution 7.5-325 mg/15mL Take by mouth 4 (four) times daily as needed for Pain.      ipratropium (ATROVENT) 42 mcg (0.06 %) nasal spray 2 sprays by Each Nostril route 4 (four) times daily.      tiZANidine (ZANAFLEX) 2 MG tablet 1 at bedtime for 3 nights then 2 every night for 3 nights then 3 every night to follow. Stay at the most comfortable dose. 90 tablet 2     No current facility-administered medications for this visit.       REVIEW OF SYSTEMS:    GENERAL:  No weight loss, malaise or fevers.  HEENT:  Negative for frequent or significant headaches.  NECK:  Negative for lumps, goiter, pain and significant neck swelling.  RESPIRATORY:  Negative for cough, wheezing or shortness of breath.  CARDIOVASCULAR:  Negative for chest pain, leg swelling or palpitations. HTN  GI:  Negative for abdominal discomfort, blood in stools or black stools or change in bowel  habits.  MUSCULOSKELETAL:  See HPI.  SKIN:  Negative for lesions, rash, and itching.  PSYCH:  Negative for sleep disturbance, mood disorder and recent psychosocial stressors.  HEMATOLOGY/LYMPHOLOGY:  Negative for prolonged bleeding, bruising easily or swollen nodes.  NEURO:   No history of headaches, syncope, paralysis, seizures or tremors.  All other reviewed and negative other than HPI.    Past Medical History:  Past Medical History:   Diagnosis Date    Anticoagulant long-term use     Hypertension        Past Surgical History:  Past Surgical History:   Procedure Laterality Date    EPIDURAL STEROID INJECTION N/A 10/10/2024    Procedure: CERVICAL C7/T1 IL SANJUANA *ASPIRIN OTC* HOLD FOR 5 DAYS  **EISSA PT**;  Surgeon: Devon Gamble MD;  Location: [facility] PAIN [facility]GT;  Service: Pain Management;  Laterality: N/A;  [phone]  2 WK F/U DEEPALI    TRANSFORAMINAL EPIDURAL INJECTION OF STEROID Right 8/14/2023    Procedure: INJECTION, STEROID, EPIDURAL, TRANSFORAMINAL APPROACH, RIGHT T12/L1 SOONER DATE;  Surgeon: Isi Womack MD;  Location: [facility]IN [facility];  Service: Pain Management;  Laterality: Right;    TRANSFORAMINAL EPIDURAL INJECTION OF STEROID Right 12/18/2023    Procedure: LUMBAR TRANSFORAMINAL RIGHT T12/L1 AND L1/2;  Surgeon: Isi Womack MD;  Location: [facility]IN [facility];  Service: Pain Management;  Laterality: Right;  [phone]  2 WK F/U DEEPALI    TRANSFORAMINAL EPIDURAL INJECTION OF STEROID Right 8/29/2024    Procedure: THORACIC TRANSFORAMINAL RIGHT T12/L1 AND L1/2 *ASPIRIN OTC* HOLD FOR 5 DAYS;  Surgeon: Isi Womack MD;  Location: [facility]IN [facility];  Service: Pain Management;  Laterality: Right;  [phone]  2 WK F/U DEEPALI    TRANSFORAMINAL EPIDURAL INJECTION OF STEROID Right 11/4/2024    Procedure: THORACIC TRANSFORAMINAL RIGHT T7/8 *ASPIRIN OTC* HOLD FOR 5 DAYS;  Surgeon: Isi Womack MD;  Location: [facility]IN [facility];  Service: Pain Management;  Laterality: Right;  [phone]  2 WK F/U DEEPALI       Family History:  No family  history on file.    Social History:  Social History     Socioeconomic History    Marital status:    Tobacco Use    Smoking status: Former     Types: Cigarettes     Passive exposure: Past    Smokeless tobacco: Never    Tobacco comments:     Quit 20 years ago   Substance and Sexual Activity    Alcohol use: Not Currently    Drug use: Never    Sexual activity: Not Currently     Social Drivers of Health     Financial Resource Strain: Low Risk  (10/9/2024)    Received from [facility]    Overall Financial Resource Strain (CARDIA)     Difficulty of Paying Living Expenses: Not hard at all   Food Insecurity: No Food Insecurity (10/9/2024)    Received from [facility]    Hunger Vital Sign     Worried About Running Out of Food in the Last Year: Never true     Ran Out of Food in the Last Year: Never true   Transportation Needs: No Transportation Needs (10/9/2024)    Received from [facility]    PRAPARE - Transportation     Lack of Transportation (Medical): No     Lack of Transportation (Non-Medical): No   Physical Activity: Sufficiently Active (10/9/2024)    Received from [facility]    Exercise Vital Sign     Days of Exercise per Week: 5 days     Minutes of Exercise per Session: 60 min   Stress: Stress Concern Present (10/9/2024)    Received from [facility]    [employer] - Occupational Stress Questionnaire     Feeling of Stress : To some extent   Housing Stability: Unknown (10/9/2024)    Received from [facility]    Housing Stability Vital Sign     Unable to Pay for Housing in the Last Year: No       OBJECTIVE:    Vitals:    11/19/24 1420   BP: 134/73   Pulse: 66   Weight: 73.9 kg (162 lb 14.7 oz)   PainSc:   2   PainLoc: Back       PHYSICAL EXAMINATION:    General appearance: Well appearing, in no acute distress, alert and oriented x3.  Psych:  Mood and affect appropriate.  Skin: Skin color, texture, turgor normal, no rashes or lesions, in both upper and lower body.  Head/face:   Atraumatic, normocephalic.    Cor: Capillary refill <3 seconds.  Pulm: Symmetric chest rise, no respiratory distress noted.   Back:  Normal ROM   Extremities: No deformities, edema, or skin discoloration.   Musculoskeletal:  Bilateral upper extremity strength is normal and symmetric.  No atrophy or tone abnormalities are noted.  Neuro:  No loss of sensation is noted.    Gait: Normal.    ASSESSMENT: 68 y.o. year old female with back and neck pain, consistent with the followin. Thoracic radiculopathy        2. Thoracic spine pain        3. Cervical radiculopathy        4. Cervical spondylosis        5. Lumbar radiculopathy        6. Myofascial pain              PLAN:     - Previous imaging reviewed.     - She is s/p right T7/8 TF SANJUANA with benefit.     - We can repeat interventional procedures as needed.      - Continue home exercise routine.    - RTC PRN.      The above plan and management options were discussed at length with patient. Patient is in agreement with the above and verbalized understanding.    Maggy Weeks NP  2024

## 2024-11-20 ENCOUNTER — OFFICE VISIT (OUTPATIENT)
Dept: SPORTS MEDICINE | Facility: CLINIC | Age: 69
End: 2024-11-20
Payer: MEDICARE

## 2024-11-20 VITALS
HEART RATE: 61 BPM | DIASTOLIC BLOOD PRESSURE: 79 MMHG | BODY MASS INDEX: 27.66 KG/M2 | HEIGHT: 64 IN | WEIGHT: 162 LBS | SYSTOLIC BLOOD PRESSURE: 130 MMHG

## 2024-11-20 DIAGNOSIS — M19.011 OSTEOARTHRITIS OF RIGHT GLENOHUMERAL JOINT: Primary | ICD-10-CM

## 2024-11-20 DIAGNOSIS — M25.511 CHRONIC RIGHT SHOULDER PAIN: ICD-10-CM

## 2024-11-20 DIAGNOSIS — G89.29 CHRONIC RIGHT SHOULDER PAIN: ICD-10-CM

## 2024-11-20 PROCEDURE — 99999 PR PBB SHADOW E&M-EST. PATIENT-LVL III: CPT | Mod: PBBFAC,,, | Performed by: PHYSICIAN ASSISTANT

## 2024-11-20 PROCEDURE — 3075F SYST BP GE 130 - 139MM HG: CPT | Mod: CPTII,S$GLB,, | Performed by: PHYSICIAN ASSISTANT

## 2024-11-20 PROCEDURE — 1160F RVW MEDS BY RX/DR IN RCRD: CPT | Mod: CPTII,S$GLB,, | Performed by: PHYSICIAN ASSISTANT

## 2024-11-20 PROCEDURE — 3008F BODY MASS INDEX DOCD: CPT | Mod: CPTII,S$GLB,, | Performed by: PHYSICIAN ASSISTANT

## 2024-11-20 PROCEDURE — 4010F ACE/ARB THERAPY RXD/TAKEN: CPT | Mod: CPTII,S$GLB,, | Performed by: PHYSICIAN ASSISTANT

## 2024-11-20 PROCEDURE — 1159F MED LIST DOCD IN RCRD: CPT | Mod: CPTII,S$GLB,, | Performed by: PHYSICIAN ASSISTANT

## 2024-11-20 PROCEDURE — 99214 OFFICE O/P EST MOD 30 MIN: CPT | Mod: S$GLB,,, | Performed by: PHYSICIAN ASSISTANT

## 2024-11-20 PROCEDURE — 1125F AMNT PAIN NOTED PAIN PRSNT: CPT | Mod: CPTII,S$GLB,, | Performed by: PHYSICIAN ASSISTANT

## 2024-11-20 PROCEDURE — 3288F FALL RISK ASSESSMENT DOCD: CPT | Mod: CPTII,S$GLB,, | Performed by: PHYSICIAN ASSISTANT

## 2024-11-20 PROCEDURE — 3078F DIAST BP <80 MM HG: CPT | Mod: CPTII,S$GLB,, | Performed by: PHYSICIAN ASSISTANT

## 2024-11-20 PROCEDURE — 1101F PT FALLS ASSESS-DOCD LE1/YR: CPT | Mod: CPTII,S$GLB,, | Performed by: PHYSICIAN ASSISTANT

## 2024-11-20 NOTE — PROGRESS NOTES
ESTABLISHED PATIENT    History 11/20/2024:  History of Present Illness    CHIEF COMPLAINT:  - Xochitl presents today for follow-up of right shoulder pain.    HPI:  Xochitl presents for follow-up regarding right shoulder pain. The pain has worsened significantly since the last visit, with her reporting that it did not last for three months as expected. The onset of the current exacerbation was in September, with her noting that the pain is now continuous. Previously, the pain was intermittent with a sensation of impingement and catching, which she could typically resolve within an hour through various movements. However, in the past three days, she has been unable to find relief, reporting difficulty reaching forward and upward.    Her last steroid injection was on August 7th, approximately 3.5 months ago. During a follow up in September, she reported feeling about 95% better. However, the relief was short-lived, with the current exacerbation occurring within the last few days.    She expresses concern that the current symptoms feel similar to a previous rotator cuff tear. She also mentions having had three epidural injections this year, although these were not specifically for the shoulder.    She reports significant functional limitations, stating difficulty with basic daily activities such as cutting vegetables due to the shoulder pain and restricted range of motion.    Xochitl denies any symptoms lasting for three months.    SURGICAL HISTORY:  - Rotator cuff and bicep repair surgery: performed arthroscopically with an additional incision for the biceps repair. The biceps tendon was reattached lower than initially planned due to surgical constraints.      ROS:  Musculoskeletal: +joint pain, -muscle pain         History 9/25/2024:  Xochitl returns here today for follow-up evaluation of her right shoulder.  She was given an intra-articular CSI at her last visit and has received significant improvement in her pain,  range of motion, and function.  She states that she is at least 95% improved.  She has also noticed a significant improvement in the frequency of her catching sensations.    History 8/7/2024:  68 y.o. Female with a history of right shoulder pain who complains today of having progressive pain and functional limitations over the last year.  She previously underwent an arthroscopic rotator cuff repair in 2018.  She did very well for the 1st few years but began having intermittent discomfort.  Her symptoms have now become more constant and occurs daily.  She reports having an occasional sharp, stabbing pain depending on certain positions of her arm.  She denies having any specific stiffness or weakness.        + mechanical symptoms, - instability    Is affecting ADLs.  Pain is 10/10 at it's worst.        PAST MEDICAL HISTORY    Past Medical History:   Diagnosis Date    Anticoagulant long-term use     Hypertension        PAST SURGICAL HISTORY     Past Surgical History:   Procedure Laterality Date    EPIDURAL STEROID INJECTION N/A 10/10/2024    Procedure: CERVICAL C7/T1 IL SANJUANA *ASPIRIN OTC* HOLD FOR 5 DAYS  **GIOVANNI PT**;  Surgeon: Devon Gamble MD;  Location: [facility]IN [facility]GT;  Service: Pain Management;  Laterality: N/A;  [phone]  2 WK F/U DEEPALI    TRANSFORAMINAL EPIDURAL INJECTION OF STEROID Right 8/14/2023    Procedure: INJECTION, STEROID, EPIDURAL, TRANSFORAMINAL APPROACH, RIGHT T12/L1 SOONER DATE;  Surgeon: Isi Womack MD;  Location: [facility]GT;  Service: Pain Management;  Laterality: Right;    TRANSFORAMINAL EPIDURAL INJECTION OF STEROID Right 12/18/2023    Procedure: LUMBAR TRANSFORAMINAL RIGHT T12/L1 AND L1/2;  Surgeon: Isi Womack MD;  Location: [facility]GT;  Service: Pain Management;  Laterality: Right;  [phone]  2 WK F/U DEEPALI    TRANSFORAMINAL EPIDURAL INJECTION OF STEROID Right 8/29/2024    Procedure: THORACIC TRANSFORAMINAL RIGHT T12/L1 AND L1/2 *ASPIRIN OTC* HOLD FOR 5 DAYS;  Surgeon: Giovanni  MD Isi;  Location: [facility] PAIN T;  Service: Pain Management;  Laterality: Right;  [phone]  2 WK F/U DEEPALI    TRANSFORAMINAL EPIDURAL INJECTION OF STEROID Right 11/4/2024    Procedure: THORACIC TRANSFORAMINAL RIGHT T7/8 *ASPIRIN OTC* HOLD FOR 5 DAYS;  Surgeon: Isi Womack MD;  Location: [facility] PAIN T;  Service: Pain Management;  Laterality: Right;  [phone]  2 WK F/U DEEPALI       FAMILY HISTORY    No family history on file.    SOCIAL HISTORY    Social History     Socioeconomic History    Marital status:    Tobacco Use    Smoking status: Former     Types: Cigarettes     Passive exposure: Past    Smokeless tobacco: Never    Tobacco comments:     Quit 20 years ago   Substance and Sexual Activity    Alcohol use: Not Currently    Drug use: Never    Sexual activity: Not Currently     Social Drivers of Health     Financial Resource Strain: Low Risk  (10/9/2024)    Received from [facility]    Overall Financial Resource Strain (CARDIA)     Difficulty of Paying Living Expenses: Not hard at all   Food Insecurity: No Food Insecurity (10/9/2024)    Received from [facility]    Hunger Vital Sign     Worried About Running Out of Food in the Last Year: Never true     Ran Out of Food in the Last Year: Never true   Transportation Needs: No Transportation Needs (10/9/2024)    Received from [facility]    PRAPARE - Transportation     Lack of Transportation (Medical): No     Lack of Transportation (Non-Medical): No   Physical Activity: Sufficiently Active (10/9/2024)    Received from [facility]    Exercise Vital Sign     Days of Exercise per Week: 5 days     Minutes of Exercise per Session: 60 min   Stress: Stress Concern Present (10/9/2024)    Received from [facility]    [employer] - Occupational Stress Questionnaire     Feeling of Stress : To some extent   Housing Stability: Unknown (10/9/2024)    Received from [facility]    Housing Stability Vital Sign     Unable to Pay for Housing in  the Last Year: No       MEDICATIONS      Current Outpatient Medications:     alendronate (FOSAMAX) 70 MG tablet, Take 70 mg by mouth every 7 days., Disp: , Rfl:     aspirin (ECOTRIN) 81 MG EC tablet, Take 81 mg by mouth once daily., Disp: , Rfl:     estradioL (ESTRACE) 0.01 % (0.1 mg/gram) vaginal cream, Place vaginally once daily., Disp: , Rfl:     FLUoxetine 40 MG capsule, Take 40 mg by mouth once daily., Disp: , Rfl:     hydrocodone-acetaminophen (HYCET) solution 7.5-325 mg/15mL, Take by mouth 4 (four) times daily as needed for Pain., Disp: , Rfl:     ipratropium (ATROVENT) 42 mcg (0.06 %) nasal spray, 2 sprays by Each Nostril route 4 (four) times daily., Disp: , Rfl:     tiZANidine (ZANAFLEX) 2 MG tablet, 1 at bedtime for 3 nights then 2 every night for 3 nights then 3 every night to follow. Stay at the most comfortable dose., Disp: 90 tablet, Rfl: 2    ALLERGIES     Review of patient's allergies indicates:  No Known Allergies      REVIEW OF SYSTEMS   Constitution: Negative. Negative for chills, fever and night sweats.   HENT: Negative for congestion and headaches.    Eyes: Negative for blurred vision, left vision loss and right vision loss.   Cardiovascular: Negative for chest pain and syncope.   Respiratory: Negative for cough and shortness of breath.    Endocrine: Negative for polydipsia, polyphagia and polyuria.   Hematologic/Lymphatic: Negative for bleeding problem. Does not bruise/bleed easily.   Skin: Negative for dry skin, itching and rash.   Musculoskeletal: Negative for falls. Positive for right shoulder pain.  Gastrointestinal: Negative for abdominal pain and bowel incontinence.   Genitourinary: Negative for bladder incontinence and nocturia.   Neurological: Negative for disturbances in coordination, loss of balance and seizures.   Psychiatric/Behavioral: Negative for depression. The patient does not have insomnia.    Allergic/Immunologic: Negative for hives and persistent infections.     PHYSICAL  "EXAMINATION    Vitals: /79   Pulse 61   Ht 5' 4" (1.626 m)   Wt 73.5 kg (162 lb)   BMI 27.81 kg/m²     General: The patient appears active and healthy with no apparent physical problems.  No disturbance of mood or affect is demonstrated. Alert and Oriented.    HEENT: Eyes normal, pupils equally round, nose normal.    Resp: Equal and symmetrical chest rises. No wheezing    CV: Regular rate    Neck: Supple; nonpainful range of motion.    Extremities: no cyanosis, clubbing, edema, or diffuse swelling.  Palpable pulses, good capillary refill of the digits.  No coolness, discoloration, edema or obvious varicosities.    Skin: no lesions noted.    Lymphatic: no detected adenopathy in the upper or lower extremities.    Neurologic: normal mental status, normal reflexes, normal gait and balance.  Patient is alert and oriented to person, place and time.  No flaccidity or spasticity is noted.  No motor or sensory deficits are noted.  Light touch is intact    Orthopaedic: SHOULDER EXAM - RIGHT    Inspection:   Normal skin color and appearance with no scars.  No muscle atrophy noted.  No scapular winging.      Palpation: No tenderness of the acromioclavicular joint, lateral edge of the acromion, biceps tendon, trapezius muscle or scapulothoracic bursa.      ROM:      PROM:     FE - 140°    Abd/ER -  70°  Abd/IR -  30°   Add/ER -  30°     AROM:    FE - 120°    Abd/ER -  60°  Abd/IR -  15°   Add/ER -  30°         Tests:     + Haley, + Neer's, - Cross Arm Adduction, - [place], - [place], - Speed. - Belly Press,  + Jobes, - Lift Off    Stability: - sulcus, - apprehension, - relocation, - load and shift, - DLS      Motor:  Rotator cuff strength is 4+/5 supraspinatus, 5/5 infraspinatus, 5/5 subscapularis. Biceps, triceps and deltoid strength is 5/5.      Neuro:     Distally there are no paresthesias, and sensation is intact to light touch in the median, ulnar, and radial distributions.  Reflexes are 2/2 biceps, triceps and " brachioradialis.        IMAGING      X-ray Shoulder 2 or More Views Right  Order: [id number]  Status: Final result       Visible to patient: Yes (seen)       Next appt: 09/26/2024 at 11:00 AM in Radiology ([facility])       Dx: Pain    0 Result Notes  Details    Reading Physician Reading Date Result Priority   Tye Aguilar MD  [phone]  [phone] 8/7/2024 Routine     Narrative & Impression  EXAMINATION:  XR SHOULDER COMPLETE 2 OR MORE VIEWS RIGHT     TECHNIQUE:  Four views of the right shoulder were obtained.     COMPARISON:  No relevant comparison examinations are currently available.     FINDINGS:  An advanced degree of right glenohumeral degenerative arthritic change is observed, including marked glenohumeral joint space narrowing, glenoid spurring, prominent spurring involving the inferomedial aspect of the right humeral head, and subchondral cystic change in the right humeral head and glenoid all observed.  There is a separate calcific/ossific opacity in the soft tissues adjacent to the inferomedial humeral spur.  No evidence of recent fracture or lytic destructive process.  No glenohumeral dislocation.     Impression:     Severe right glenohumeral degenerative arthritic changes, as discussed above.        Electronically signed by:Tye Aguilar MD  Date:                                            08/07/2024  Time:                                           11:45           Exam Ended: 08/07/24 11:40 CDT Last Resulted: 08/07/24 11:45 CDT               IMPRESSION       ICD-10-CM ICD-9-CM   1. Osteoarthritis of right glenohumeral joint  M19.011 715.91   2. Chronic right shoulder pain  M25.511 719.41    G89.29 338.29           MEDICATIONS PRESCRIBED      None    RECOMMENDATIONS     Surgical and conservative treatment options for advanced glenohumeral joint osteoarthritis were discussed in depth today  She responded very well to the CSI but her relief was short-lived  We discussed repeating the steroid injection  every 3 months but her best long-term option may be a reverse versus total shoulder arthroplasty  The patient elected to proceed with operative intervention after all risks, benefits, and alternatives were discussed with the patient.  The risks of surgery include bleeding, scar formation, injuries to nerves, arteries and veins, need for additional surgeries, blood clots, infections, inability to return to the same level of the performance and the risk of anesthesia.   CT scan with Tornier protocol for total shoulder replacement has been ordered  MRI right shoulder for evaluation of the previous rotator cuff repair has been ordered  Continue activity modifications  Encouraged frequent icing, NSAIDs, and oral Tylenol as needed for pain  Return to clinic with Elma Xiong MD for CT/MRI results and surgical planning; right total shoulder arthroplasty versus reverse total shoulder arthroplasty       Procedures        All of their questions were answered.  They will call the clinic with any questions or concerns in the interim.     Should the patient's symptoms worsen, persist, or fail to improve they should return for reevaluation and I would be happy to see them back anytime.                  Ronald Cid PA-C       Please be aware that this note has been generated with the assistance of [employer] voice-to-text.  Please excuse any spelling or grammatical errors.     Thank you for choosing Ronald Cid PA-C for your sports medicine care. It is our goal to provide you with exceptional care that will help keep you healthy, active, and get you back in the game.     If you felt that you received exemplary care today, please consider leaving feedback for Sirena Cid PA-C on [employer] at [email or web address]       Please do not hesitate to reach out to us via email, phone, or MyChart with any questions, concerns, or feedback.     This note was generated with the assistance of [facility]  listening technology. Verbal consent was obtained by the patient and accompanying visitor(s) for the recording of patient appointment to facilitate this note. I attest to having reviewed and edited the generated note for accuracy, though some syntax or spelling errors may persist. Please contact the author of this note for any clarification.

## 2024-11-27 ENCOUNTER — HOSPITAL ENCOUNTER (OUTPATIENT)
Dept: RADIOLOGY | Facility: OTHER | Age: 69
Discharge: HOME | End: 2024-11-27
Attending: PHYSICIAN ASSISTANT
Payer: MEDICARE

## 2024-11-27 DIAGNOSIS — M25.511 CHRONIC RIGHT SHOULDER PAIN: ICD-10-CM

## 2024-11-27 DIAGNOSIS — M19.011 OSTEOARTHRITIS OF RIGHT GLENOHUMERAL JOINT: ICD-10-CM

## 2024-11-27 DIAGNOSIS — G89.29 CHRONIC RIGHT SHOULDER PAIN: ICD-10-CM

## 2024-11-27 PROCEDURE — 73221 MRI JOINT UPR EXTREM W/O DYE: CPT | Mod: 26,RT,, | Performed by: RADIOLOGY

## 2024-11-27 PROCEDURE — 73200 CT UPPER EXTREMITY W/O DYE: CPT | Mod: TC,RT

## 2024-11-27 PROCEDURE — 73200 CT UPPER EXTREMITY W/O DYE: CPT | Mod: 26,RT,, | Performed by: RADIOLOGY

## 2024-11-27 PROCEDURE — 73221 MRI JOINT UPR EXTREM W/O DYE: CPT | Mod: TC,RT

## 2024-11-29 NOTE — PROGRESS NOTES
ESTABLISHED PATIENT    History 12/2/2024  Xochitl presents to clinic today for evaluation of her right shoulder pain and to discuss possible surgical interventions. She enjoys playing [employer].  She has been treated for her shoulder pain by Ronald Cid PA-C. History below. She responded very well to the CSI but her relief was short-lived. Other treatments she has attempted are NSAIDS, activity modification, Ice, and physical therapy, without relief. A new CT/MRI was order at her last visit. She has a history of a rotator cuff repair in 2017.     History 11/20/2024:  - Xochitl presents today for follow-up of right shoulder pain.     HPI:  Xochitl presents for follow-up regarding right shoulder pain. The pain has worsened significantly since the last visit, with her reporting that it did not last for three months as expected. The onset of the current exacerbation was in September, with her noting that the pain is now continuous. Previously, the pain was intermittent with a sensation of impingement and catching, which she could typically resolve within an hour through various movements. However, in the past three days, she has been unable to find relief, reporting difficulty reaching forward and upward.     Her last steroid injection was on August 7th, approximately 3.5 months ago. During a follow up in September, she reported feeling about 95% better. However, the relief was short-lived, with the current exacerbation occurring within the last few days.     She expresses concern that the current symptoms feel similar to a previous rotator cuff tear. She also mentions having had three epidural injections this year, although these were not specifically for the shoulder.     She reports significant functional limitations, stating difficulty with basic daily activities such as cutting vegetables due to the shoulder pain and restricted range of motion.     Xochitl denies any symptoms lasting for three months.      SURGICAL HISTORY:  - Rotator cuff and bicep repair surgery: performed arthroscopically with an additional incision for the biceps repair. The biceps tendon was reattached lower than initially planned due to surgical constraints.          History 9/25/2024:  Xochitl returns here today for follow-up evaluation of her right shoulder.  She was given an intra-articular CSI at her last visit and has received significant improvement in her pain, range of motion, and function.  She states that she is at least 95% improved.  She has also noticed a significant improvement in the frequency of her catching sensations.    History 8/7/2024:  68 y.o. Female with a history of right shoulder pain who complains today of having progressive pain and functional limitations over the last year.  She previously underwent an arthroscopic rotator cuff repair in 2018.  She did very well for the 1st few years but began having intermittent discomfort.  Her symptoms have now become more constant and occurs daily.  She reports having an occasional sharp, stabbing pain depending on certain positions of her arm.  She denies having any specific stiffness or weakness.        + mechanical symptoms, - instability    Is affecting ADLs.  Pain is 10/10 at it's worst.        PAST MEDICAL HISTORY    Past Medical History:   Diagnosis Date    Anticoagulant long-term use     Hypertension        PAST SURGICAL HISTORY     Past Surgical History:   Procedure Laterality Date    EPIDURAL STEROID INJECTION N/A 10/10/2024    Procedure: CERVICAL C7/T1 IL SANJUANA *ASPIRIN OTC* HOLD FOR 5 DAYS  **GIOVANNI PT**;  Surgeon: Devon Gamble MD;  Location: [facility] PAIN [facility]GT;  Service: Pain Management;  Laterality: N/A;  [phone]  2 WK F/U DEEPALI    TRANSFORAMINAL EPIDURAL INJECTION OF STEROID Right 8/14/2023    Procedure: INJECTION, STEROID, EPIDURAL, TRANSFORAMINAL APPROACH, RIGHT T12/L1 SOONER DATE;  Surgeon: Isi Womack MD;  Location: [facility]IN [facility];  Service: Pain Management;   Laterality: Right;    TRANSFORAMINAL EPIDURAL INJECTION OF STEROID Right 12/18/2023    Procedure: LUMBAR TRANSFORAMINAL RIGHT T12/L1 AND L1/2;  Surgeon: Isi Womack MD;  Location: [facility] PAIN [facility]GT;  Service: Pain Management;  Laterality: Right;  [phone]  2 WK F/U DEEPALI    TRANSFORAMINAL EPIDURAL INJECTION OF STEROID Right 8/29/2024    Procedure: THORACIC TRANSFORAMINAL RIGHT T12/L1 AND L1/2 *ASPIRIN OTC* HOLD FOR 5 DAYS;  Surgeon: Isi Womack MD;  Location: [facility] PAIN [facility]GT;  Service: Pain Management;  Laterality: Right;  [phone]  2 WK F/U DEEPALI    TRANSFORAMINAL EPIDURAL INJECTION OF STEROID Right 11/4/2024    Procedure: THORACIC TRANSFORAMINAL RIGHT T7/8 *ASPIRIN OTC* HOLD FOR 5 DAYS;  Surgeon: Isi Womack MD;  Location: [facility] PAIN [facility]GT;  Service: Pain Management;  Laterality: Right;  [phone]  2 WK F/U DEEPALI       FAMILY HISTORY    No family history on file.    SOCIAL HISTORY    Social History     Socioeconomic History    Marital status:    Tobacco Use    Smoking status: Former     Types: Cigarettes     Passive exposure: Past    Smokeless tobacco: Never    Tobacco comments:     Quit 20 years ago   Substance and Sexual Activity    Alcohol use: Not Currently    Drug use: Never    Sexual activity: Not Currently     Social Drivers of Health     Financial Resource Strain: Low Risk  (10/9/2024)    Received from [facility]    Overall Financial Resource Strain (CARDIA)     Difficulty of Paying Living Expenses: Not hard at all   Food Insecurity: No Food Insecurity (10/9/2024)    Received from [facility]    Hunger Vital Sign     Worried About Running Out of Food in the Last Year: Never true     Ran Out of Food in the Last Year: Never true   Transportation Needs: No Transportation Needs (10/9/2024)    Received from [facility]    PRAPARE - Transportation     Lack of Transportation (Medical): No     Lack of Transportation (Non-Medical): No   Physical Activity: Sufficiently Active (10/9/2024)    Received from  [facility]    Exercise Vital Sign     Days of Exercise per Week: 5 days     Minutes of Exercise per Session: 60 min   Stress: Stress Concern Present (10/9/2024)    Received from [facility]    [employer] - Occupational Stress Questionnaire     Feeling of Stress : To some extent   Housing Stability: Unknown (10/9/2024)    Received from [facility]    Housing Stability Vital Sign     Unable to Pay for Housing in the Last Year: No       MEDICATIONS      Current Outpatient Medications:     alendronate (FOSAMAX) 70 MG tablet, Take 70 mg by mouth every 7 days., Disp: , Rfl:     aspirin (ECOTRIN) 81 MG EC tablet, Take 81 mg by mouth once daily., Disp: , Rfl:     estradioL (ESTRACE) 0.01 % (0.1 mg/gram) vaginal cream, Place vaginally once daily., Disp: , Rfl:     FLUoxetine 40 MG capsule, Take 40 mg by mouth once daily., Disp: , Rfl:     hydrocodone-acetaminophen (HYCET) solution 7.5-325 mg/15mL, Take by mouth 4 (four) times daily as needed for Pain., Disp: , Rfl:     ipratropium (ATROVENT) 42 mcg (0.06 %) nasal spray, 2 sprays by Each Nostril route 4 (four) times daily., Disp: , Rfl:     tiZANidine (ZANAFLEX) 2 MG tablet, 1 at bedtime for 3 nights then 2 every night for 3 nights then 3 every night to follow. Stay at the most comfortable dose., Disp: 90 tablet, Rfl: 2    ALLERGIES     Review of patient's allergies indicates:  No Known Allergies      REVIEW OF SYSTEMS   Constitution: Negative. Negative for chills, fever and night sweats.   HENT: Negative for congestion and headaches.    Eyes: Negative for blurred vision, left vision loss and right vision loss.   Cardiovascular: Negative for chest pain and syncope.   Respiratory: Negative for cough and shortness of breath.    Endocrine: Negative for polydipsia, polyphagia and polyuria.   Hematologic/Lymphatic: Negative for bleeding problem. Does not bruise/bleed easily.   Skin: Negative for dry skin, itching and rash.   Musculoskeletal: Negative  for falls. Positive for right shoulder pain.  Gastrointestinal: Negative for abdominal pain and bowel incontinence.   Genitourinary: Negative for bladder incontinence and nocturia.   Neurological: Negative for disturbances in coordination, loss of balance and seizures.   Psychiatric/Behavioral: Negative for depression. The patient does not have insomnia.    Allergic/Immunologic: Negative for hives and persistent infections.     PHYSICAL EXAMINATION    Vitals: There were no vitals taken for this visit.    General: The patient appears active and healthy with no apparent physical problems.  No disturbance of mood or affect is demonstrated. Alert and Oriented.    HEENT: Eyes normal, pupils equally round, nose normal.    Resp: Equal and symmetrical chest rises. No wheezing    CV: Regular rate    Neck: Supple; nonpainful range of motion.    Extremities: no cyanosis, clubbing, edema, or diffuse swelling.  Palpable pulses, good capillary refill of the digits.  No coolness, discoloration, edema or obvious varicosities.    Skin: no lesions noted.    Lymphatic: no detected adenopathy in the upper or lower extremities.    Neurologic: normal mental status, normal reflexes, normal gait and balance.  Patient is alert and oriented to person, place and time.  No flaccidity or spasticity is noted.  No motor or sensory deficits are noted.  Light touch is intact    Orthopaedic: SHOULDER EXAM - RIGHT    Inspection:   Normal skin color and appearance with no scars.  No muscle atrophy noted.  No scapular winging.      Palpation: No tenderness of the acromioclavicular joint, lateral edge of the acromion, biceps tendon, trapezius muscle or scapulothoracic bursa.      ROM:      PROM:     FE - 140°    Abd/ER -  70°  Abd/IR -  30°   Add/ER -  30°     AROM:    FE - 120°    Abd/ER -  60°  Abd/IR -  15°   Add/ER -  30°         Tests:     + Haley, + Neer's, - Cross Arm Adduction, - [place], - [place], - Speed. - Belly Press,  + Jobes, - Lift  Off    Stability: - sulcus, - apprehension, - relocation, - load and shift, - DLS      Motor:  Rotator cuff strength is 4+/5 supraspinatus, 5/5 infraspinatus, 5/5 subscapularis. Biceps, triceps and deltoid strength is 5/5.      Neuro:     Distally there are no paresthesias, and sensation is intact to light touch in the median, ulnar, and radial distributions.  Reflexes are 2/2 biceps, triceps and brachioradialis.        IMAGING      X-ray Shoulder 2 or More Views Right  Order: [id number]  Status: Final result       Visible to patient: Yes (seen)       Next appt: 09/26/2024 at 11:00 AM in Radiology ([facility])       Dx: Pain    0 Result Notes  Details    Reading Physician Reading Date Result Priority   Tye Aguilar MD  [phone]  [phone] 8/7/2024 Routine     Narrative & Impression  EXAMINATION:  XR SHOULDER COMPLETE 2 OR MORE VIEWS RIGHT     TECHNIQUE:  Four views of the right shoulder were obtained.     COMPARISON:  No relevant comparison examinations are currently available.     FINDINGS:  An advanced degree of right glenohumeral degenerative arthritic change is observed, including marked glenohumeral joint space narrowing, glenoid spurring, prominent spurring involving the inferomedial aspect of the right humeral head, and subchondral cystic change in the right humeral head and glenoid all observed.  There is a separate calcific/ossific opacity in the soft tissues adjacent to the inferomedial humeral spur.  No evidence of recent fracture or lytic destructive process.  No glenohumeral dislocation.     Impression:     Severe right glenohumeral degenerative arthritic changes, as discussed above.        Electronically signed by:Tye Aguilar MD  Date:                                            08/07/2024  Time:                                           11:45           Exam Ended: 08/07/24 11:40 CDT Last Resulted: 08/07/24 11:45 CDT           MRI Shoulder Without Contrast Right  Narrative: EXAMINATION:  MRI  SHOULDER WITHOUT CONTRAST RIGHT    CLINICAL HISTORY:  Shoulder pain, chronic, osteoarthritis suspected;Please evaluate the previous rotator cuff repair for total shoulder versus reverse total shoulder replacement surgical planning;  Primary osteoarthritis, right shoulder    FINDINGS:  There is a intrasubstance tear of the supraspinatus tendon with subacromial-subdeltoid bursitis.  There is DJD.  There are significant osteophytes of the glenohumeral joint consistent with H ADD/[place] shoulder.  There is a chronic labral tear.  There is a joint effusion.  No fracture dislocation bone destruction seen.  No marrow replacement, marrow edema, infection, or neoplasm seen.  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  There is tendinosis tendinopathy of the rotator cuff.  No fracture dislocation bone destruction seen.  No soft tissue masses are seen.  Impression: Internal derangement as above.    Electronically signed by: Omar Hathaway MD  Date:    11/29/2024  Time:    08:04  CT Shoulder Without Contrast Right  Narrative: EXAMINATION:  CT SHOULDER WITHOUT CONTRAST RIGHT    CLINICAL HISTORY:  Shoulder pain, chronic, osteoarthritis suspected;  Primary osteoarthritis, right shoulder    FINDINGS:  There is degenerative change.  There are large osteophytes.  No fracture dislocation bone destruction seen.  Changes are consistent with DJD as well as H ADD arthropathy/[place] shoulder.  Upper ribs are unremarkable.  Right lung demonstrates nothing focal.  No soft tissue masses are seen.  Impression: No acute process seen.    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    Electronically signed by: Omar Hathaway MD  Date:    11/29/2024  Time:    08:02        IMPRESSION       ICD-10-CM ICD-9-CM   1. Osteoarthritis of right glenohumeral joint  M19.011 715.91   2. Chronic right shoulder pain  M25.511 719.41    G89.29 338.29             MEDICATIONS PRESCRIBED      None    RECOMMENDATIONS      Surgical versus non-surgical options discussed today; Right reverse total shoulder arthroplasty  The patient elected to proceed with operative intervention after all risks, benefits, and alternatives were discussed with the patient.  The risks of surgery include bleeding, scar formation, injuries to nerves, arteries and veins, need for additional surgeries, blood clots, infections, inability to return to the same level of the performance and the risk of anesthesia.   She will need dental clearance and PCP clearance   She would like to have this surgery completed prior to the end of the year. We will order the [employer] custom piece so that we can possibly proceed with surgery in December.

## 2024-12-02 ENCOUNTER — OFFICE VISIT (OUTPATIENT)
Dept: SPORTS MEDICINE | Facility: CLINIC | Age: 69
End: 2024-12-02
Payer: MEDICARE

## 2024-12-02 VITALS
HEART RATE: 63 BPM | WEIGHT: 164.13 LBS | SYSTOLIC BLOOD PRESSURE: 145 MMHG | DIASTOLIC BLOOD PRESSURE: 84 MMHG | HEIGHT: 64 IN | BODY MASS INDEX: 28.02 KG/M2

## 2024-12-02 DIAGNOSIS — M19.011 OSTEOARTHRITIS OF RIGHT GLENOHUMERAL JOINT: Primary | ICD-10-CM

## 2024-12-02 DIAGNOSIS — M25.511 CHRONIC RIGHT SHOULDER PAIN: ICD-10-CM

## 2024-12-02 DIAGNOSIS — G89.29 CHRONIC RIGHT SHOULDER PAIN: ICD-10-CM

## 2024-12-02 PROCEDURE — 1125F AMNT PAIN NOTED PAIN PRSNT: CPT | Mod: CPTII,S$GLB,, | Performed by: ORTHOPAEDIC SURGERY

## 2024-12-02 PROCEDURE — 99215 OFFICE O/P EST HI 40 MIN: CPT | Mod: S$GLB,,, | Performed by: ORTHOPAEDIC SURGERY

## 2024-12-02 PROCEDURE — 3077F SYST BP >= 140 MM HG: CPT | Mod: CPTII,S$GLB,, | Performed by: ORTHOPAEDIC SURGERY

## 2024-12-02 PROCEDURE — 3008F BODY MASS INDEX DOCD: CPT | Mod: CPTII,S$GLB,, | Performed by: ORTHOPAEDIC SURGERY

## 2024-12-02 PROCEDURE — 1159F MED LIST DOCD IN RCRD: CPT | Mod: CPTII,S$GLB,, | Performed by: ORTHOPAEDIC SURGERY

## 2024-12-02 PROCEDURE — 3288F FALL RISK ASSESSMENT DOCD: CPT | Mod: CPTII,S$GLB,, | Performed by: ORTHOPAEDIC SURGERY

## 2024-12-02 PROCEDURE — 4010F ACE/ARB THERAPY RXD/TAKEN: CPT | Mod: CPTII,S$GLB,, | Performed by: ORTHOPAEDIC SURGERY

## 2024-12-02 PROCEDURE — 99999 PR PBB SHADOW E&M-EST. PATIENT-LVL III: CPT | Mod: PBBFAC,,, | Performed by: ORTHOPAEDIC SURGERY

## 2024-12-02 PROCEDURE — 3079F DIAST BP 80-89 MM HG: CPT | Mod: CPTII,S$GLB,, | Performed by: ORTHOPAEDIC SURGERY

## 2024-12-02 PROCEDURE — 1101F PT FALLS ASSESS-DOCD LE1/YR: CPT | Mod: CPTII,S$GLB,, | Performed by: ORTHOPAEDIC SURGERY

## 2024-12-03 ENCOUNTER — TELEPHONE (OUTPATIENT)
Dept: SPORTS MEDICINE | Facility: CLINIC | Age: 69
End: 2024-12-03
Payer: MEDICARE

## 2024-12-03 NOTE — TELEPHONE ENCOUNTER
Called and LVM       ----- Message from Med Assistant Nascimento sent at 12/2/2024  3:05 PM CST -----    ----- Message -----  From: Asuncion Arroyo  Sent: 12/2/2024   2:42 PM CST  To: Tyrese Wills Staff         Nature of Call: informing of dental appt     Best Call Back Number: [phone]      Additional Information:  REBECCA ZOLLINGER calling regarding Patient Advice. PT is calling back, because she was told to inform that she has a dental appt on 12/27/24 to have a crown put in. call PT if needed

## 2024-12-11 ENCOUNTER — PATIENT MESSAGE (OUTPATIENT)
Dept: SPORTS MEDICINE | Facility: CLINIC | Age: 69
End: 2024-12-11
Payer: MEDICARE

## 2024-12-11 NOTE — PROGRESS NOTES
"Caller: Aida Conner    Relationship to patient: Self    Best call back number: [phone]     Patient is needing: PATIENT WAS TOLD BY [facility] [employer] THAT THEY NEED A \"CNM\" (SHE BELIEVES RENEWAL PAPERWORK) FOR HER OXYGEN.          " [facility]   Physical Therapy Treatment Note      Name: Xochitl [facility] Number: [id number]    Therapy Diagnosis:   Encounter Diagnoses   Name Primary?    Decreased ROM of thoracic spine Yes    Decreased ROM of neck      Physician: Maggy Weeks NP    Visit Date: 2024    Physician Orders: PT Eval and Treat   Medical Diagnosis from Referral: Other osteoarthritis of spine, cervical region [M47.892], Myofascial pain [M79.18], Bilateral thoracic back pain, unspecified chronicity [M54.6]   Evaluation Date: 2024  Authorization Period Expiration: 2/15/2024  Plan of Care Expiration: 2024  Progress Note Due:   Date of Surgery: N/A  Visit # / Visits authorized: ,   FOTO: 1/ 3     Precautions: Standard      Time In: 11:00 pm  Time Out: 11:55 pm  Total Billable Time: 55 minutes (30 minutes 1:1)    PTA Visit #: 0/5       Subjective     Patient reports: that she is noticing improvements with her thoracic exercises and is able to play pickle ball for greater periods without symptoms.    She was compliant with home exercise program.  Response to previous treatment: increased thoracic mobility  Functional change: increased exercise participation without symptoms    Pain: 1/10  Location: thoracic spine and neck     Objective      Objective Measures updated at progress report unless specified.     Treatment     Xochitl received the treatments listed below:      therapeutic exercises to develop strength, endurance, ROM, flexibility, and posture for 15 minutes including:    Standing open books red thera band 2x10 both sides  At wall thoracic rotation/extension wall clock 15x both sides  Supine thoracic extension foam roller 20x    manual therapy techniques: Joint mobilizations, Manual traction, and Myofacial release were applied to the: neck/thoracic for 15 minutes, includinth right rib inspiratory resisted glide  Upper Cervical flexion MET  Upper  Cervical Rotation MET   Prone CTJ mobilization    NT:  Supine Bear Lower Thoracic Manipulation grade 5  Prone UPA right lower thoracic    neuromuscular re-education activities to improve: Balance, Coordination, Kinesthetic, and Sense for 25 minutes. The following activities were included:    BP Cuff 20 to 24 mmHg 10x 10 sec hold, 30 to 34 mmHg 10x 10 sec hold  90/90 walkouts external rotation and internal rotation 2x10 both sides  External rotation isometric yellow thera band scaption 2x8  Seated Cane rotation with SB and inspiration 5 minutes    Patient Education and Home Exercises       Education provided:   - home exercise program    Written Home Exercises Provided: yes. Exercises were reviewed and Xochitl was able to demonstrate them prior to the end of the session.  Xochitl demonstrated good  understanding of the education provided. See Electronic Medical Record under Patient Instructions for exercises provided during therapy sessions    Assessment     Patient continues to present with improved thoracic mobility and tolerance to rotational exercise participation. Patient progressed today with controlling thoracic rotation with resistance. Plan to continue to progress as tolerated with introduction of plymometric next session if appropriate.    Xochitl Is progressing well towards her goals.   Patient prognosis is Good.     Patient will continue to benefit from skilled outpatient physical therapy to address the deficits listed in the problem list box on initial evaluation, provide pt/family education and to maximize pt's level of independence in the home and community environment.     Patient's spiritual, cultural and educational needs considered and pt agreeable to plan of care and goals.     Anticipated barriers to physical therapy: none    GOALS: Short Term Goals: 4 weeks  1.Report decreased neck/thoracic pain  <   / =  3  /10  to increase tolerance for athletic pickle ball activities and gym participation  2.  Increase cervical ROM by 5-10 degrees in order to perform ADLs with decreased difficulty.  3. Increase strength in B shoulders/scapular stabilizers by 1/3 MMT grade to increase tolerance for ADL and work activities.  4. Pt to tolerate HEP to improve ROM and independence with ADL's     Long Term Goals: 8 weeks  1.Report decreased neck/thoracic pain  <   / =  1  /10  to increase tolerance for athletic activities and gym participation  2. Increase UE/neck flexibility in order to improve posture.    3.Increased strength in B shoulders/scapular stabilizers to >/= 4/5 MMT grade to increase tolerance for ADL and work activities.  4.  Pt will report at  level (20-30% impaired) on FOTO neck score for neck pain disability to demonstrate decrease in disability and improvement in neck pain.     Plan     Continue per initial plan of care    Pa Arizmendi, PT

## 2024-12-19 ENCOUNTER — PATIENT MESSAGE (OUTPATIENT)
Dept: SPORTS MEDICINE | Facility: CLINIC | Age: 69
End: 2024-12-19
Payer: MEDICARE

## 2024-12-20 DIAGNOSIS — M19.011 OSTEOARTHRITIS OF RIGHT GLENOHUMERAL JOINT: Primary | ICD-10-CM

## 2024-12-20 RX ORDER — CELECOXIB 200 MG/1
200 CAPSULE ORAL DAILY
Qty: 30 CAPSULE | Refills: 1 | Status: CANCELLED | OUTPATIENT
Start: 2024-12-20

## 2024-12-20 RX ORDER — CELECOXIB 200 MG/1
200 CAPSULE ORAL DAILY
Qty: 30 CAPSULE | Refills: 0 | Status: SHIPPED | OUTPATIENT
Start: 2024-12-20 | End: 2025-01-19

## 2025-01-02 ENCOUNTER — PATIENT MESSAGE (OUTPATIENT)
Dept: SPORTS MEDICINE | Facility: CLINIC | Age: 70
End: 2025-01-02
Payer: MEDICARE

## 2025-01-03 ENCOUNTER — PATIENT MESSAGE (OUTPATIENT)
Dept: SPORTS MEDICINE | Facility: CLINIC | Age: 70
End: 2025-01-03
Payer: MEDICARE

## 2025-01-03 DIAGNOSIS — M19.011 OSTEOARTHRITIS OF RIGHT GLENOHUMERAL JOINT: Primary | ICD-10-CM

## 2025-01-24 ENCOUNTER — OFFICE VISIT (OUTPATIENT)
Dept: SPORTS MEDICINE | Facility: CLINIC | Age: 70
End: 2025-01-24
Payer: MEDICARE

## 2025-01-24 VITALS
BODY MASS INDEX: 29.34 KG/M2 | DIASTOLIC BLOOD PRESSURE: 89 MMHG | HEIGHT: 64 IN | WEIGHT: 171.88 LBS | SYSTOLIC BLOOD PRESSURE: 164 MMHG | HEART RATE: 67 BPM

## 2025-01-24 DIAGNOSIS — G89.29 CHRONIC RIGHT SHOULDER PAIN: ICD-10-CM

## 2025-01-24 DIAGNOSIS — M19.011 OSTEOARTHRITIS OF RIGHT GLENOHUMERAL JOINT: Primary | ICD-10-CM

## 2025-01-24 DIAGNOSIS — M25.511 CHRONIC RIGHT SHOULDER PAIN: ICD-10-CM

## 2025-01-24 PROCEDURE — 1125F AMNT PAIN NOTED PAIN PRSNT: CPT | Mod: CPTII,S$GLB,, | Performed by: ORTHOPAEDIC SURGERY

## 2025-01-24 PROCEDURE — 1101F PT FALLS ASSESS-DOCD LE1/YR: CPT | Mod: CPTII,S$GLB,, | Performed by: ORTHOPAEDIC SURGERY

## 2025-01-24 PROCEDURE — 1159F MED LIST DOCD IN RCRD: CPT | Mod: CPTII,S$GLB,, | Performed by: ORTHOPAEDIC SURGERY

## 2025-01-24 PROCEDURE — 3079F DIAST BP 80-89 MM HG: CPT | Mod: CPTII,S$GLB,, | Performed by: ORTHOPAEDIC SURGERY

## 2025-01-24 PROCEDURE — 99999 PR PBB SHADOW E&M-EST. PATIENT-LVL III: CPT | Mod: PBBFAC,,, | Performed by: ORTHOPAEDIC SURGERY

## 2025-01-24 PROCEDURE — 3008F BODY MASS INDEX DOCD: CPT | Mod: CPTII,S$GLB,, | Performed by: ORTHOPAEDIC SURGERY

## 2025-01-24 PROCEDURE — 3288F FALL RISK ASSESSMENT DOCD: CPT | Mod: CPTII,S$GLB,, | Performed by: ORTHOPAEDIC SURGERY

## 2025-01-24 PROCEDURE — 3077F SYST BP >= 140 MM HG: CPT | Mod: CPTII,S$GLB,, | Performed by: ORTHOPAEDIC SURGERY

## 2025-01-24 PROCEDURE — 99214 OFFICE O/P EST MOD 30 MIN: CPT | Mod: S$GLB,,, | Performed by: ORTHOPAEDIC SURGERY

## 2025-01-24 RX ORDER — FLUOXETINE HYDROCHLORIDE 20 MG/1
1 CAPSULE ORAL DAILY
COMMUNITY
Start: 2024-10-09 | End: 2025-10-09

## 2025-01-24 RX ORDER — BUPROPION HYDROCHLORIDE 150 MG/1
1 TABLET ORAL EVERY MORNING
COMMUNITY
Start: 2024-10-09 | End: 2025-10-09

## 2025-01-24 RX ORDER — ROSUVASTATIN CALCIUM 5 MG/1
1 TABLET, COATED ORAL NIGHTLY
COMMUNITY
Start: 2024-10-16

## 2025-01-24 RX ORDER — PHENAZOPYRIDINE HYDROCHLORIDE 200 MG/1
200 TABLET, FILM COATED ORAL
COMMUNITY
Start: 2024-08-31

## 2025-01-24 RX ORDER — PANTOPRAZOLE SODIUM 40 MG/1
1 TABLET, DELAYED RELEASE ORAL DAILY
COMMUNITY

## 2025-01-24 RX ORDER — PRAZOSIN HYDROCHLORIDE 1 MG/1
2 CAPSULE ORAL NIGHTLY
COMMUNITY
Start: 2024-06-10

## 2025-01-24 RX ORDER — CYCLOSPORINE 0.5 MG/ML
1 EMULSION OPHTHALMIC 3 TIMES DAILY
COMMUNITY
Start: 2023-06-29

## 2025-01-24 RX ORDER — NITROFURANTOIN 25; 75 MG/1; MG/1
100 CAPSULE ORAL 2 TIMES DAILY
COMMUNITY
Start: 2024-08-31

## 2025-01-24 NOTE — PROGRESS NOTES
ESTABLISHED PATIENT  Patient is sent to clinic for follow up of her right shoulder. She was initially scheduled with Dr. Xiong for her shoulder (see history below). She would like to proceed with a shoulder surgery with me.     History 12/2/2024  Xochitl presents to clinic today for evaluation of her right shoulder pain and to discuss possible surgical interventions. She enjoys playing [place].  She has been treated for her shoulder pain by Ronald Cid PA-C. History below. She responded very well to the CSI but her relief was short-lived. Other treatments she has attempted are NSAIDS, activity modification, Ice, and physical therapy, without relief. A new CT/MRI was order at her last visit. She has a history of a rotator cuff repair in 2017.     History 11/20/2024:  - Xochitl presents today for follow-up of right shoulder pain.     HPI:  Xochitl presents for follow-up regarding right shoulder pain. The pain has worsened significantly since the last visit, with her reporting that it did not last for three months as expected. The onset of the current exacerbation was in September, with her noting that the pain is now continuous. Previously, the pain was intermittent with a sensation of impingement and catching, which she could typically resolve within an hour through various movements. However, in the past three days, she has been unable to find relief, reporting difficulty reaching forward and upward.     Her last steroid injection was on August 7th, approximately 3.5 months ago. During a follow up in September, she reported feeling about 95% better. However, the relief was short-lived, with the current exacerbation occurring within the last few days.     She expresses concern that the current symptoms feel similar to a previous rotator cuff tear. She also mentions having had three epidural injections this year, although these were not specifically for the shoulder.     She reports significant functional  limitations, stating difficulty with basic daily activities such as cutting vegetables due to the shoulder pain and restricted range of motion.     Xochitl denies any symptoms lasting for three months.     SURGICAL HISTORY:  - Rotator cuff and bicep repair surgery: performed arthroscopically with an additional incision for the biceps repair. The biceps tendon was reattached lower than initially planned due to surgical constraints.          History 9/25/2024:  Xochitl returns here today for follow-up evaluation of her right shoulder.  She was given an intra-articular CSI at her last visit and has received significant improvement in her pain, range of motion, and function.  She states that she is at least 95% improved.  She has also noticed a significant improvement in the frequency of her catching sensations.    History 8/7/2024:  69 y.o. Female with a history of right shoulder pain who complains today of having progressive pain and functional limitations over the last year.  She previously underwent an arthroscopic rotator cuff repair in 2018.  She did very well for the 1st few years but began having intermittent discomfort.  Her symptoms have now become more constant and occurs daily.  She reports having an occasional sharp, stabbing pain depending on certain positions of her arm.  She denies having any specific stiffness or weakness.        + mechanical symptoms, - instability    Is affecting ADLs.  Pain is 10/10 at it's worst.        PAST MEDICAL HISTORY    Past Medical History:   Diagnosis Date    Anticoagulant long-term use     Hypertension        PAST SURGICAL HISTORY     Past Surgical History:   Procedure Laterality Date    EPIDURAL STEROID INJECTION N/A 10/10/2024    Procedure: CERVICAL C7/T1 IL SANJUANA *ASPIRIN OTC* HOLD FOR 5 DAYS  **EISSA PT**;  Surgeon: Devon Gamble MD;  Location: [facility];  Service: Pain Management;  Laterality: N/A;  [phone]  2 WK F/U DEEPALI    TRANSFORAMINAL EPIDURAL INJECTION  OF STEROID Right 8/14/2023    Procedure: INJECTION, STEROID, EPIDURAL, TRANSFORAMINAL APPROACH, RIGHT T12/L1 SOONER DATE;  Surgeon: Isi Womack MD;  Location: [facility];  Service: Pain Management;  Laterality: Right;    TRANSFORAMINAL EPIDURAL INJECTION OF STEROID Right 12/18/2023    Procedure: LUMBAR TRANSFORAMINAL RIGHT T12/L1 AND L1/2;  Surgeon: Isi Womack MD;  Location: [facility];  Service: Pain Management;  Laterality: Right;  [phone]  2 WK F/U DEEPALI    TRANSFORAMINAL EPIDURAL INJECTION OF STEROID Right 8/29/2024    Procedure: THORACIC TRANSFORAMINAL RIGHT T12/L1 AND L1/2 *ASPIRIN OTC* HOLD FOR 5 DAYS;  Surgeon: Isi Womack MD;  Location: [facility];  Service: Pain Management;  Laterality: Right;  [phone]  2 WK F/U DEEPALI    TRANSFORAMINAL EPIDURAL INJECTION OF STEROID Right 11/4/2024    Procedure: THORACIC TRANSFORAMINAL RIGHT T7/8 *ASPIRIN OTC* HOLD FOR 5 DAYS;  Surgeon: Isi Womack MD;  Location: [facility];  Service: Pain Management;  Laterality: Right;  [phone]  2 WK F/U DEEPALI       FAMILY HISTORY    No family history on file.    SOCIAL HISTORY    Social History     Socioeconomic History    Marital status:    Tobacco Use    Smoking status: Former     Types: Cigarettes     Passive exposure: Past    Smokeless tobacco: Never    Tobacco comments:     Quit 20 years ago   Substance and Sexual Activity    Alcohol use: Not Currently    Drug use: Never    Sexual activity: Not Currently     Social Drivers of Health     Financial Resource Strain: Low Risk  (10/9/2024)    Received from [facility]    Overall Financial Resource Strain (CARDIA)     Difficulty of Paying Living Expenses: Not hard at all   Food Insecurity: No Food Insecurity (10/9/2024)    Received from [facility]    Hunger Vital Sign     Worried About Running Out of Food in the Last Year: Never true     Ran Out of Food in the Last Year: Never true   Transportation Needs: No Transportation Needs (10/9/2024)     Received from [facility]    PRAPARE - Transportation     Lack of Transportation (Medical): No     Lack of Transportation (Non-Medical): No   Physical Activity: Sufficiently Active (10/9/2024)    Received from [facility]    Exercise Vital Sign     Days of Exercise per Week: 5 days     Minutes of Exercise per Session: 60 min   Stress: Stress Concern Present (10/9/2024)    Received from [facility]    [employer] [place] [employer] - Occupational Stress Questionnaire     Feeling of Stress : To some extent   Housing Stability: Unknown (10/9/2024)    Received from [facility]    Housing Stability Vital Sign     Unable to Pay for Housing in the Last Year: No       MEDICATIONS      Current Outpatient Medications:     alendronate (FOSAMAX) 70 MG tablet, Take 70 mg by mouth every 7 days., Disp: , Rfl:     aspirin (ECOTRIN) 81 MG EC tablet, Take 81 mg by mouth once daily., Disp: , Rfl:     buPROPion (WELLBUTRIN XL) 150 MG TB24 tablet, Take 1 tablet by mouth every morning., Disp: , Rfl:     cycloSPORINE (RESTASIS) 0.05 % ophthalmic emulsion, Place 1 drop into both eyes 3 (three) times daily., Disp: , Rfl:     estradioL (ESTRACE) 0.01 % (0.1 mg/gram) vaginal cream, Place vaginally once daily., Disp: , Rfl:     FLUoxetine 20 MG capsule, Take 1 capsule by mouth once daily., Disp: , Rfl:     hydrocodone-acetaminophen (HYCET) solution 7.5-325 mg/15mL, Take by mouth 4 (four) times daily as needed for Pain., Disp: , Rfl:     ipratropium (ATROVENT) 42 mcg (0.06 %) nasal spray, 2 sprays by Each Nostril route 4 (four) times daily., Disp: , Rfl:     nitrofurantoin, macrocrystal-monohydrate, (MACROBID) 100 MG capsule, Take 100 mg by mouth 2 (two) times daily., Disp: , Rfl:     pantoprazole (PROTONIX) 40 MG tablet, Take 1 tablet by mouth once daily., Disp: , Rfl:     phenazopyridine (PYRIDIUM) 200 MG tablet, Take 200 mg by mouth as needed for Pain., Disp: , Rfl:     prazosin (MINIPRESS) 1 MG Cap, Take 2 capsules by mouth every  "evening., Disp: , Rfl:     rosuvastatin (CRESTOR) 5 MG tablet, Take 1 tablet by mouth every evening., Disp: , Rfl:     tiZANidine (ZANAFLEX) 2 MG tablet, 1 at bedtime for 3 nights then 2 every night for 3 nights then 3 every night to follow. Stay at the most comfortable dose., Disp: 90 tablet, Rfl: 2    ALLERGIES     Review of patient's allergies indicates:   Allergen Reactions    I.n.h. Hives    Protease-amylase equip  Hives     Tide detergent         REVIEW OF SYSTEMS   Constitution: Negative. Negative for chills, fever and night sweats.   HENT: Negative for congestion and headaches.    Eyes: Negative for blurred vision, left vision loss and right vision loss.   Cardiovascular: Negative for chest pain and syncope.   Respiratory: Negative for cough and shortness of breath.    Endocrine: Negative for polydipsia, polyphagia and polyuria.   Hematologic/Lymphatic: Negative for bleeding problem. Does not bruise/bleed easily.   Skin: Negative for dry skin, itching and rash.   Musculoskeletal: Negative for falls. Positive for right shoulder pain.  Gastrointestinal: Negative for abdominal pain and bowel incontinence.   Genitourinary: Negative for bladder incontinence and nocturia.   Neurological: Negative for disturbances in coordination, loss of balance and seizures.   Psychiatric/Behavioral: Negative for depression. The patient does not have insomnia.    Allergic/Immunologic: Negative for hives and persistent infections.     PHYSICAL EXAMINATION    Vitals: BP (!) 164/89 (BP Location: Left arm, Patient Position: Sitting)   Pulse 67   Ht 5' 4" (1.626 m)   Wt 78 kg (171 lb 13.6 oz)   BMI 29.50 kg/m²     General: The patient appears active and healthy with no apparent physical problems.  No disturbance of mood or affect is demonstrated. Alert and Oriented.    HEENT: Eyes normal, pupils equally round, nose normal.    Resp: Equal and symmetrical chest rises. No wheezing    CV: Regular rate    Neck: Supple; " nonpainful range of motion.    Extremities: no cyanosis, clubbing, edema, or diffuse swelling.  Palpable pulses, good capillary refill of the digits.  No coolness, discoloration, edema or obvious varicosities.    Skin: no lesions noted.    Lymphatic: no detected adenopathy in the upper or lower extremities.    Neurologic: normal mental status, normal reflexes, normal gait and balance.  Patient is alert and oriented to person, place and time.  No flaccidity or spasticity is noted.  No motor or sensory deficits are noted.  Light touch is intact    Orthopaedic: SHOULDER EXAM - RIGHT    Inspection:   Normal skin color and appearance with no scars.  No muscle atrophy noted.  No scapular winging.      Palpation: No tenderness of the acromioclavicular joint, lateral edge of the acromion, biceps tendon, trapezius muscle or scapulothoracic bursa.      ROM:      PROM:     FE - 140°    Abd/ER -  70°  Abd/IR -  30°   Add/ER -  30°     AROM:    FE - 120°    Abd/ER -  60°  Abd/IR -  15°   Add/ER -  30°         Tests:     + Haley, + Neer's, - Cross Arm Adduction, - [place], - [place], - Speed. - Belly Press,  + Jobes, - Lift Off    Stability: - sulcus, - apprehension, - relocation, - load and shift, - DLS      Motor:  Rotator cuff strength is 4+/5 supraspinatus, 5/5 infraspinatus, 5/5 subscapularis. Biceps, triceps and deltoid strength is 5/5.      Neuro:     Distally there are no paresthesias, and sensation is intact to light touch in the median, ulnar, and radial distributions.  Reflexes are 2/2 biceps, triceps and brachioradialis.        IMAGING      X-ray Shoulder 2 or More Views Right  Order: [id number]  Status: Final result       Visible to patient: Yes (seen)       Next appt: 09/26/2024 at 11:00 AM in Radiology ([facility])       Dx: Pain    0 Result Notes  Details    Reading Physician Reading Date Result Priority   Tye Aguilar MD  [phone]  [phone] 8/7/2024 Routine     Narrative & Impression  EXAMINATION:  XR  SHOULDER COMPLETE 2 OR MORE VIEWS RIGHT     TECHNIQUE:  Four views of the right shoulder were obtained.     COMPARISON:  No relevant comparison examinations are currently available.     FINDINGS:  An advanced degree of right glenohumeral degenerative arthritic change is observed, including marked glenohumeral joint space narrowing, glenoid spurring, prominent spurring involving the inferomedial aspect of the right humeral head, and subchondral cystic change in the right humeral head and glenoid all observed.  There is a separate calcific/ossific opacity in the soft tissues adjacent to the inferomedial humeral spur.  No evidence of recent fracture or lytic destructive process.  No glenohumeral dislocation.     Impression:     Severe right glenohumeral degenerative arthritic changes, as discussed above.        Electronically signed by:Tye Aguilar MD  Date:                                            08/07/2024  Time:                                           11:45           Exam Ended: 08/07/24 11:40 CDT Last Resulted: 08/07/24 11:45 CDT           MRI Shoulder Without Contrast Right  Narrative: EXAMINATION:  MRI SHOULDER WITHOUT CONTRAST RIGHT    CLINICAL HISTORY:  Shoulder pain, chronic, osteoarthritis suspected;Please evaluate the previous rotator cuff repair for total shoulder versus reverse total shoulder replacement surgical planning;  Primary osteoarthritis, right shoulder    FINDINGS:  There is a intrasubstance tear of the supraspinatus tendon with subacromial-subdeltoid bursitis.  There is DJD.  There are significant osteophytes of the glenohumeral joint consistent with H ADD/[place] shoulder.  There is a chronic labral tear.  There is a joint effusion.  No fracture dislocation bone destruction seen.  No marrow replacement, marrow edema, infection, or neoplasm seen.  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  There is tendinosis tendinopathy of the rotator cuff.  No fracture  dislocation bone destruction seen.  No soft tissue masses are seen.  Impression: Internal derangement as above.    Electronically signed by: Omar Hathaway MD  Date:    11/29/2024  Time:    08:04  CT Shoulder Without Contrast Right  Narrative: EXAMINATION:  CT SHOULDER WITHOUT CONTRAST RIGHT    CLINICAL HISTORY:  Shoulder pain, chronic, osteoarthritis suspected;  Primary osteoarthritis, right shoulder    FINDINGS:  There is degenerative change.  There are large osteophytes.  No fracture dislocation bone destruction seen.  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  Upper ribs are unremarkable.  Right lung demonstrates nothing focal.  No soft tissue masses are seen.  Impression: No acute process seen.    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    Electronically signed by: Omar Hathaway MD  Date:    11/29/2024  Time:    08:02        IMPRESSION     No diagnosis found.            MEDICATIONS PRESCRIBED      None    RECOMMENDATIONS     Treatment options were discussed with the patient about shoulder.  I reviewed the MRI images with her and what this means for her shoulder.    We discussed both non-operative and operative options for her shoulder and the risks and benefits of each. Time was given for questions to be asked and all concerns were answered.    She would like to go forward with surgery for her shoulder which I think is reasonable.  All specific risks and benefits were reviewed.    These risks include but are not limited to: bleeding, infection, scarring, re-tear of repair, irreparability of the tear, damage to neurovascular structures, damage to cartilage, stiffness, blood clots, pulmonary embolism, swelling, compartment syndrome, need for further surgery, and the risks of anesthesia.      She verbalized her understanding of these risks and wished to proceed with surgery.    Time was spent face-to-face with the patient during this encounter on counseling about  treatment options including surgery and coordination of her care for preoperative visits, surgery and post-operative rehab.     The operative plan will be:    right   1. rTSA (ysabel)    Patient will  need medical clearance prior to the pre-operative appointment.    All questions were answered, patient will contact us for questions or concerns in the interim.

## 2025-01-25 DIAGNOSIS — M19.011 OSTEOARTHRITIS OF RIGHT GLENOHUMERAL JOINT: Primary | ICD-10-CM

## 2025-01-25 RX ORDER — CELECOXIB 200 MG/1
200 CAPSULE ORAL DAILY
Qty: 30 CAPSULE | Refills: 1 | Status: SHIPPED | OUTPATIENT
Start: 2025-01-25

## 2025-01-29 ENCOUNTER — TELEPHONE (OUTPATIENT)
Dept: PAIN MEDICINE | Facility: CLINIC | Age: 70
End: 2025-01-29
Payer: MEDICARE

## 2025-01-29 NOTE — TELEPHONE ENCOUNTER
----- Message from Yu sent at 1/29/2025  3:46 PM CST -----  Regarding: Questions  Name of Who is Calling:Xochitl            What is the request in detail:Pt is requesting a call back because she needs to have a epidural shot next week and wants to speak to Maggy.            Can the clinic reply by [facility]:No            What Number to Call Back if not in [facility]: [phone]

## 2025-01-30 ENCOUNTER — OFFICE VISIT (OUTPATIENT)
Dept: PAIN MEDICINE | Facility: CLINIC | Age: 70
End: 2025-01-30
Payer: MEDICARE

## 2025-01-30 ENCOUNTER — TELEPHONE (OUTPATIENT)
Dept: SPORTS MEDICINE | Facility: CLINIC | Age: 70
End: 2025-01-30
Payer: MEDICARE

## 2025-01-30 DIAGNOSIS — M54.14 THORACIC RADICULOPATHY: Primary | ICD-10-CM

## 2025-01-30 DIAGNOSIS — M54.6 THORACIC SPINE PAIN: ICD-10-CM

## 2025-01-30 PROCEDURE — 1159F MED LIST DOCD IN RCRD: CPT | Mod: CPTII,95,, | Performed by: NURSE PRACTITIONER

## 2025-01-30 PROCEDURE — 98005 SYNCH AUDIO-VIDEO EST LOW 20: CPT | Mod: 95,,, | Performed by: NURSE PRACTITIONER

## 2025-01-30 PROCEDURE — 1160F RVW MEDS BY RX/DR IN RCRD: CPT | Mod: CPTII,95,, | Performed by: NURSE PRACTITIONER

## 2025-01-30 NOTE — PROGRESS NOTES
Chronic Patient Established Note   Chronic Pain-Tele-Medicine-Established Note (Follow up visit)        The patient location is: Home  The chief complaint leading to consultation is: pain  Visit type: Virtual visit with synchronous audio and video  Total time spent with patient: 25 min  Each patient to whom he or she provides medical services by telemedicine is:  (1) informed of the relationship between the physician and patient and the respective role of any other health care provider with respect to management of the patient; and (2) notified that he or she may decline to receive medical services by telemedicine and may withdraw from such care at any time.          SUBJECTIVE:    Interval History 1/30/2025:  The patient returns to clinic today for follow up of neck and back pain via virtual visit. She reports increased left sided mid back pain that radiates into the left ribs. This is similar to the right sided pain she had in October. This is worse with taking deep breaths. She is scheduled for upcoming shoulder replacement. She would like to try an injection prior to aid with recovery. She continues to perform a home exercise routine. She denies any other health changes.     Interval History 11/19/2024:  The patient returns to clinic today for follow up of neck and back pain. She is s/p right T7/8 TF SANJUANA on 11/4/2024. She reports 100% relief of her pain. Her rib pain has resolved. She reports intermittent mid back pain lower than injection site. This is tolerable at this time. She is performing a home exercise routine. She denies any other health changes. Her pain today is 2/10.    Interval History 10/24/2024:  The patient returns to clinic today for follow up of neck and back pain. She is s/p C7/T1 IL SANJUANA on 10/10/2024. She reports 95% relief of her neck pain. She reports intermittent neck pain on the left. This is tolerable at this time. She is scheduled for thoracic injection next week. She continues to  report mid back pain that is in between the shoulders blades that radiates into the right. She is performing a home exercise routine. She denies any other health changes. Her pain today is 0/10.    Interval History 10/2/2024:  The patient returns to clinic today for follow up of neck and mid back pain. She is here today for imaging review. She continues to report neck pain that radiates into the shoulder. She also notes headaches. She denies any radicular arm pain. Her pain is worse with turning her head and activity. She is performing a home exercise routine. She denies any other health changes. Her pain today is 2/10.     Interval History 9/13/2024:  The patient returns to clinic today for follow up of pain. She is s/p right T12/L1 and L1/2 TF SANJUANA on 8/29/2024. She reports 100% relief of her back pain. She reports increased neck pain. She describes this pain as pressure. She does endorse head pain at the base that radiates forward. She also reports pain that radiates into the shoulder blade and around the rib. Her pain is worse with activity. She denies any radicular arm pain. She continues to perform a home exercise routine. She denies any other health changes. Her pain today is 8/10.    Interval History 7/23/2024:  The patient returns to clinic today for follow up of pain via virtual visit. She reports increased right shoulder pain over the few days. She was reaching down to get something off the floor. She felt something give in her shoulder. This pain felt similar to her pain before rotator cuff surgery. She did take Flexeril and 1/2 a Norco last night with benefit. The pain is much improved today. She also reports increased mid back pain over the last 2 months. She reports mid back pain that radiates into her ribs, right greater than left. She is performing home exercise. She denies any other health changes.     Interval History 4/3/2024:  Patient returns to clinic for follow up of neck and back pain. She is  "s/p TPI's on 2/8/24, which she reports has given her 100% percent relief for 2 months. Patient expresses that bilateral neck pain has returned + mid back pain. Patient expresses that with increased acitivity thorugh the day the muscles of the neck feel tighter. Patient expresses she continues to do home exercises + weights, and pickleball, feels like the exercises have not improved her pain.  Patient is not taking robaxin 500mg TID, she expresses taking flexeril at night as needed, and utilizes a heating pad. Patient states her current pain level is 4/10. Patient describes back pain has returned x 1 month, now is localized to the center of the back without radiation.     Interval History 3/5/2024:  The patient has a virtual follow up for neck and back pain. She is s/p TPIs for neck pain which she says helped 100% for that pain. Her primary complaint today is middle back pain. She had thoracic SANJUANA in the past but feels like this is slightly higher. However, she says that it is tolerable at this time. She completed PT today and says that she plans to keep up with her home exercises. Her overall pain today is 5/10.    Interval History 1/10/2024:  Rebecca Johnston Zollinger returns to clinic today s/p Right T12/L1 and L1/L2 TA SANJUANA on 12/18/2023.  She reports 85% relief that is ongoing.  She is back to walking 2 miles again.  She does find that when she exerts herself to her full extent that the pain returns slightly.  Today she continues to complain of neck pain from her previous visit and of new mid back pain.  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  She states that the pain is located bilaterally at the base of her skull and into her neck, left greater than right.  She describes the pain as pressure and it feels like "a clamp". Sometimes she has a headache by the end of the day.  The mid back pain is occasionally present in " the AM and is relieve by stretching.  It is exacerbated by aerobic activity and pickleball.  The pain will wrap around right side of ribs and under her breast. .  She has not started  PT yet-she was supposed to start yesterday, but the appointment was cancelled due to weather. Denies bowel and bladder dysfunction, fever, chills, nightsweats or weight changes. She continues Robaxin Pain today is 5/10.    She denies any new weakness, denies fevers, numbness, saddle anesthesia or incontinence.     Interval History 12/15/2023:  The patient returns to clinic today for follow up of back pain. She reports increased neck pain over the last month. She reports neck pain and pain at the base of her head, left side greater than right. This pain is worse with bending forward. She has tried Excedrin and Flexeril with limited relief. She denies any radicular arm pain. She continues to report right sided mid and low back pain. She does have radiating pain into the right groin and anterior thigh. She is scheduled for SANJUANA next week. She continues to perform a home exercise routine. She denies any other health changes. Her pain today is 8/10.     Interval History 11/21/2023:  The patient returns to clinic today for follow up of back pain via virtual visit. She is here today for imaging review. She reports worsened right sided mid and low back pain. This radiates into the right groin and anterior thigh. She denies any left leg pain. Her pain is worse with prolonged walking. She is performing a home exercise routine. She denies any other health changes.     Interval History 8/28/2023:  Rebecca Johnston Zollinger presents to the clinic for a follow-up appointment for back pain via virtual visit. She is s/p right T12/L1 TF SANJUANA on 8/14/2023. She reports 100% relief of her mid back pain. She continues to report low back pain that radiates into her groin bilaterally, right greater than left. This is worse with prolonged walking. She is no  longer able to walk 4 miles, which is her typical exercise routine. She has completed multiple rounds of PT. She denies any other health changes.       Original HPI:  67 year old female who presents with Right sided lower back pain. 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 The pain is located in her R back and goes to the R hip and groin when she walks. At worst it is a 10/10, baseline is a 3/10. She describes it as aching. She used to be able to walk 4 miles, now she states she can only walk 1 mile without pain. Last saw pain management at  2 years ago, no interventions performed. She has tried multiple rounds of PT, most recently in 2022 with some improvement, completed over 6 weeks of PT. She takes occasional ibuprofen and tylenol. Used to take tramadol and opioids but is no longer taking. She has had an MRI back in 2021 showing protruding disc at T12-L1. She denies any new weakness, denies fevers, numbness, saddle anesthesia or incontinence.     Pain Disability Index Review:      11/19/2024     2:21 PM 9/13/2024    10:08 AM 4/3/2024     8:09 AM   Last 3 PDI Scores   Pain Disability Index (PDI) 14 [phone]       Pain Medications:  None    Opioid Contract: not applicable     report:  Not applicable    Pain Procedures:   8/14/2023- Right T12/L1 TF SANJUANA  12/18/2023- Right T12/L1 and L1/2 TF SANJUANA- 85% relief  8/29/2024- Right T12/L1 and L1/2 TF SANJUANA- 100% relief  10/10/2024- C7/T1 IL SANJUANA- 95% relief   11/4/2024- Right T7/8 TF SANJUANA- 100% relief    Physical Therapy/Home Exercise: yes    Imaging:  MRI Cervical Spine 9/26/2024:  COMPARISON:  12/15/2023     FINDINGS:  Alignment: Normal.     Vertebrae: Normal marrow signal. No fracture.     Discs: Disc space narrowing present C5-C6-C7 with marginal osteophytosis     Cord: Normal.     Skull base and craniocervical junction: Normal.     Degenerative findings:     C2-C3: There is no focal  disc herniation.No significant central canal narrowing.  No significant neural foraminal narrowing.     C3-C4: There is no focal disc herniation.No significant central canal narrowing.  Mild LEFT neural foraminal narrowing.     C4-C5: There is no focal disc herniation.No significant central canal narrowing.  Moderate LEFT neural foraminal narrowing.     C5-C6: Posterior marginal osteophytic disc spur complex.There is no focal disc herniation.No significant central canal narrowing.  Moderate-severe RIGHT mild LEFT neural foraminal narrowing.     C6-C7: Posterior marginal osteophytic disc spur complex.There is no focal disc herniation.No significant central canal narrowing.  Moderate-severe LEFT mild RIGHT neural foraminal narrowing.     C7-T1: There is no focal disc herniation.No significant central canal narrowing.  No significant neural foraminal narrowing.     Paraspinal muscles & soft tissues: Multinodular goiter with dominant nodule RIGHT thyroid lobe, hyperintense measuring 2.1 cm.  Thyroid ultrasound recommended..     Impression:     Multilevel lumbar spondylosis most evident C5-C7 disc space levels with marginal osteophytosis and uncovertebral hypertrophy resulting in associated neural foraminal encroachment as detailed above.     Multiple thyroid nodules largest of which of RIGHT thyroid lobe 2.1 cm.  Routine thyroid ultrasound recommended for further evaluation.    MRI Thoracic Spine 9/26/2024:  COMPARISON:  None.     FINDINGS:  Alignment: The thoracic spine demonstrates proper alignment.     Vertebra: The vertebral bodies show normal signal intensity and height with no indication of acute fracture or pathologic marrow replacement process.  Suspect incidental hemangioma T10 with cortical endplate degenerative changes most evident T7-T8 through T9-T10     Disc: Multilevel disc space narrowing and desiccation with posterior marginal osteophytic spurring.     Cord: The demonstrated portion of the spinal cord  is normal in signal intensity at all levels with no indication of myelomalacia or cord edema.     Evaluation of the surrounding soft tissue structures demonstrates no acute abnormality.  As noted in the cervical portion of the report, multiple nodules within the thyroid for which routine thyroid ultrasound recommended.     Degenerative findings: There is a extradural soft tissue density at the T12 level lateralizing RIGHT with mass effect upon the thecal sac.  This is associated with a RIGHT foraminal disc from the T12-L1 level.  These findings are identified on series 7 image 55 and series 7, image 53, confirmed on sagittal series 4, image 8. there is associated mass effect upon the thecal sac yet no evidence for cord deviation.     Impression:     RIGHT foraminal disc protrusion T12-L1 with extruded disc material RIGHT extradural T12 level      XR CERVICAL SPINE 5 VIEW WITH FLEX AND EXT-12/15/2023  COMPARISON:  None.     FINDINGS:  Vertebral body heights are maintained.     Disc space narrowing and endplate changes C5-6 and C6-7.  Small posterior osteophytes at these levels.     Oblique views show bony narrowing of the neural foramina C5-6 on the right and C4-5 and C5-6 on the left.     Reversal of the normal cervical lordosis in the lower cervical spine.  Otherwise, AP alignment appears anatomic.     No significant prevertebral soft tissue edema.     Impression:     Degenerative change as above.       MRI Lumbar Spine 9/22/2023:  COMPARISON:  None.     FINDINGS:  Alignment: Grade 1 retrolisthesis of L5-S1.  Straightening of lumbar lordosis.     Vertebrae: Multilevel degenerative endplate changes no fracture or marrow infiltrative process.     Discs: Moderate to severe disc height loss noted throughout the lumbar spine.  No evidence for discitis.     Cord: Conus terminates at L1-L2 and appears unremarkable.  Cauda equina appears unremarkable.     Degenerative findings:     T11-T12: Right paracentral disc extrusion  results in moderate effacement of the right lateral recess and mass effect upon the right T12 nerve root.     T12-L1: Circumferential disc bulge with right paracentral disc extrusion result in mild effacement of the right lateral recess.     L1-L2: Circumferential disc bulge and mild facet arthropathy result in mild bilateral neural foraminal narrowing.     L2-L3: Circumferential disc bulge and mild facet arthropathy result in mild left neural foraminal narrowing.     L3-L4: Circumferential disc bulge and moderate facet arthropathy result in mild spinal canal stenosis and mild bilateral neural foraminal narrowing.     L4-L5: Circumferential disc bulge and mild facet arthropathy result in mild effacement of the lateral recesses and mild bilateral neural foraminal narrowing peer     L5-S1: Circumferential disc bulge and mild facet arthropathy result in moderate right, mild left neural foraminal narrowing.     Paraspinal muscles & soft tissues: Moderate paraspinal muscle atrophy.  4.0 cm left renal cyst.     Impression:     1. Multilevel degenerative changes of the lumbar and lower thoracic spine as detailed above.     Xray Hip 7/5/2023:  FINDINGS:  No acute fractures.  Some degenerative changes visualized lower lumbar spine to include endplate osteophytes and lumbosacral disc narrowing and possible vacuum phenomenon.  Intact right and left SI joints.  No definite narrowing of right or left hip joint spaces or acetabular spurring.  Preserved right and left femoral head contours.     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 Hives    Protease-amylase equip  Hives     Tide detergent       Current Medications:   Current Outpatient Medications   Medication Sig  Dispense Refill    alendronate (FOSAMAX) 70 MG tablet Take 70 mg by mouth every 7 days.      aspirin (ECOTRIN) 81 MG EC tablet Take 81 mg by mouth once daily.      buPROPion (WELLBUTRIN XL) 150 MG TB24 tablet Take 1 tablet by mouth every morning.      celecoxib (CELEBREX) 200 MG capsule Take 1 capsule (200 mg total) by mouth once daily. 30 capsule 1    cycloSPORINE (RESTASIS) 0.05 % ophthalmic emulsion Place 1 drop into both eyes 3 (three) times daily.      estradioL (ESTRACE) 0.01 % (0.1 mg/gram) vaginal cream Place vaginally once daily.      FLUoxetine 20 MG capsule Take 1 capsule by mouth once daily.      hydrocodone-acetaminophen (HYCET) solution 7.5-325 mg/15mL Take by mouth 4 (four) times daily as needed for Pain.      ipratropium (ATROVENT) 42 mcg (0.06 %) nasal spray 2 sprays by Each Nostril route 4 (four) times daily.      nitrofurantoin, macrocrystal-monohydrate, (MACROBID) 100 MG capsule Take 100 mg by mouth 2 (two) times daily.      pantoprazole (PROTONIX) 40 MG tablet Take 1 tablet by mouth once daily.      phenazopyridine (PYRIDIUM) 200 MG tablet Take 200 mg by mouth as needed for Pain.      prazosin (MINIPRESS) 1 MG Cap Take 2 capsules by mouth every evening.      rosuvastatin (CRESTOR) 5 MG tablet Take 1 tablet by mouth every evening.      tiZANidine (ZANAFLEX) 2 MG tablet 1 at bedtime for 3 nights then 2 every night for 3 nights then 3 every night to follow. Stay at the most comfortable dose. 90 tablet 2     No current facility-administered medications for this visit.       REVIEW OF SYSTEMS:    GENERAL:  No weight loss, malaise or fevers.  HEENT:  Negative for frequent or significant headaches.  NECK:  Negative for lumps, goiter, pain and significant neck swelling.  RESPIRATORY:  Negative for cough, wheezing or shortness of breath.  CARDIOVASCULAR:  Negative for chest pain, leg swelling or palpitations. HTN  GI:  Negative for abdominal discomfort, blood in stools or black stools or change in bowel  habits.  MUSCULOSKELETAL:  See HPI.  SKIN:  Negative for lesions, rash, and itching.  PSYCH:  Negative for sleep disturbance, mood disorder and recent psychosocial stressors.  HEMATOLOGY/LYMPHOLOGY:  Negative for prolonged bleeding, bruising easily or swollen nodes.  NEURO:   No history of headaches, syncope, paralysis, seizures or tremors.  All other reviewed and negative other than HPI.    Past Medical History:  Past Medical History:   Diagnosis Date    Anticoagulant long-term use     Hypertension        Past Surgical History:  Past Surgical History:   Procedure Laterality Date    EPIDURAL STEROID INJECTION N/A 10/10/2024    Procedure: CERVICAL C7/T1 IL SANJUANA *ASPIRIN OTC* HOLD FOR 5 DAYS  **EISSA PT**;  Surgeon: Devon Gamble MD;  Location: [facility];  Service: Pain Management;  Laterality: N/A;  [phone]  2 WK F/U DEEPALI    TRANSFORAMINAL EPIDURAL INJECTION OF STEROID Right 8/14/2023    Procedure: INJECTION, STEROID, EPIDURAL, TRANSFORAMINAL APPROACH, RIGHT T12/L1 SOONER DATE;  Surgeon: Isi Womack MD;  Location: [facility];  Service: Pain Management;  Laterality: Right;    TRANSFORAMINAL EPIDURAL INJECTION OF STEROID Right 12/18/2023    Procedure: LUMBAR TRANSFORAMINAL RIGHT T12/L1 AND L1/2;  Surgeon: Isi Womack MD;  Location: [facility];  Service: Pain Management;  Laterality: Right;  [phone]  2 WK F/U DEEPALI    TRANSFORAMINAL EPIDURAL INJECTION OF STEROID Right 8/29/2024    Procedure: THORACIC TRANSFORAMINAL RIGHT T12/L1 AND L1/2 *ASPIRIN OTC* HOLD FOR 5 DAYS;  Surgeon: Isi Womack MD;  Location: [facility];  Service: Pain Management;  Laterality: Right;  [phone]  2 WK F/U DEEPALI    TRANSFORAMINAL EPIDURAL INJECTION OF STEROID Right 11/4/2024    Procedure: THORACIC TRANSFORAMINAL RIGHT T7/8 *ASPIRIN OTC* HOLD FOR 5 DAYS;  Surgeon: Isi Womack MD;  Location: [facility];  Service: Pain Management;  Laterality: Right;  [phone]  2 WK F/U DEEPALI       Family History:  No family  history on file.    Social History:  Social History     Socioeconomic History    Marital status:    Tobacco Use    Smoking status: Former     Types: Cigarettes     Passive exposure: Past    Smokeless tobacco: Never    Tobacco comments:     Quit 20 years ago   Substance and Sexual Activity    Alcohol use: Not Currently    Drug use: Never    Sexual activity: Not Currently     Social Drivers of Health     Financial Resource Strain: Low Risk  (10/9/2024)    Received from [facility]    Overall Financial Resource Strain (CARDIA)     Difficulty of Paying Living Expenses: Not hard at all   Food Insecurity: No Food Insecurity (10/9/2024)    Received from [facility]    Hunger Vital Sign     Worried About Running Out of Food in the Last Year: Never true     Ran Out of Food in the Last Year: Never true   Transportation Needs: No Transportation Needs (10/9/2024)    Received from [facility]    PRAPARE - Transportation     Lack of Transportation (Medical): No     Lack of Transportation (Non-Medical): No   Physical Activity: Sufficiently Active (10/9/2024)    Received from [facility]    Exercise Vital Sign     Days of Exercise per Week: 5 days     Minutes of Exercise per Session: 60 min   Stress: Stress Concern Present (10/9/2024)    Received from [facility]    [employer] - Occupational Stress Questionnaire     Feeling of Stress : To some extent   Housing Stability: Unknown (10/9/2024)    Received from [facility]    Housing Stability Vital Sign     Unable to Pay for Housing in the Last Year: No       OBJECTIVE:    Exam limited due to virtual visit:  General appearance: Well appearing, in no acute distress, alert and oriented x3.  Psych:  Mood and affect appropriate.    Previous physical exam:  There were no vitals filed for this visit.      PHYSICAL EXAMINATION:    General appearance: Well appearing, in no acute distress, alert and oriented x3.  Psych:  Mood and affect appropriate.  Skin:  Skin color, texture, turgor normal, no rashes or lesions, in both upper and lower body.  Head/face:  Atraumatic, normocephalic.    Cor: Capillary refill <3 seconds.  Pulm: Symmetric chest rise, no respiratory distress noted.   Back:  Normal ROM   Extremities: No deformities, edema, or skin discoloration.   Musculoskeletal:  Bilateral upper extremity strength is normal and symmetric.  No atrophy or tone abnormalities are noted.  Neuro:  No loss of sensation is noted.    Gait: Normal.    ASSESSMENT: 69 y.o. year old female with back and neck pain, consistent with the followin. Thoracic radiculopathy  Procedure Order to Pain Management      2. Thoracic spine pain                PLAN:     - Previous imaging reviewed.     - Schedule for left T7/8 TF SANJUANA.      - Continue current medications.     - Continue home exercise routine.    - RTC 2 weeks after above procedure.       The above plan and management options were discussed at length with patient. Patient is in agreement with the above and verbalized understanding.    Maggy Weeks NP  2025

## 2025-01-30 NOTE — TELEPHONE ENCOUNTER
Rescheduled patients surgery after her crown. Told patient we would have to move surgery back per Dr. Aguilar and high risk of infection. Patient stated understanding.       ----- Message from Tech Jasiel sent at 1/29/2025  3:31 PM CST -----  Patient broke tooth. Has temporary on and needs a crown. Can she proceed with dentist clearance?

## 2025-01-30 NOTE — H&P (VIEW-ONLY)
Chronic Patient Established Note   Chronic Pain-Tele-Medicine-Established Note (Follow up visit)        The patient location is: Home  The chief complaint leading to consultation is: pain  Visit type: Virtual visit with synchronous audio and video  Total time spent with patient: 25 min  Each patient to whom he or she provides medical services by telemedicine is:  (1) informed of the relationship between the physician and patient and the respective role of any other health care provider with respect to management of the patient; and (2) notified that he or she may decline to receive medical services by telemedicine and may withdraw from such care at any time.          SUBJECTIVE:    Interval History 1/30/2025:  The patient returns to clinic today for follow up of neck and back pain via virtual visit. She reports increased left sided mid back pain that radiates into the left ribs. This is similar to the right sided pain she had in October. This is worse with taking deep breaths. She is scheduled for upcoming shoulder replacement. She would like to try an injection prior to aid with recovery. She continues to perform a home exercise routine. She denies any other health changes.     Interval History 11/19/2024:  The patient returns to clinic today for follow up of neck and back pain. She is s/p right T7/8 TF SANJUANA on 11/4/2024. She reports 100% relief of her pain. Her rib pain has resolved. She reports intermittent mid back pain lower than injection site. This is tolerable at this time. She is performing a home exercise routine. She denies any other health changes. Her pain today is 2/10.    Interval History 10/24/2024:  The patient returns to clinic today for follow up of neck and back pain. She is s/p C7/T1 IL SANJUANA on 10/10/2024. She reports 95% relief of her neck pain. She reports intermittent neck pain on the left. This is tolerable at this time. She is scheduled for thoracic injection next week. She continues to  report mid back pain that is in between the shoulders blades that radiates into the right. She is performing a home exercise routine. She denies any other health changes. Her pain today is 0/10.    Interval History 10/2/2024:  The patient returns to clinic today for follow up of neck and mid back pain. She is here today for imaging review. She continues to report neck pain that radiates into the shoulder. She also notes headaches. She denies any radicular arm pain. Her pain is worse with turning her head and activity. She is performing a home exercise routine. She denies any other health changes. Her pain today is 2/10.     Interval History 9/13/2024:  The patient returns to clinic today for follow up of pain. She is s/p right T12/L1 and L1/2 TF SANJUANA on 8/29/2024. She reports 100% relief of her back pain. She reports increased neck pain. She describes this pain as pressure. She does endorse head pain at the base that radiates forward. She also reports pain that radiates into the shoulder blade and around the rib. Her pain is worse with activity. She denies any radicular arm pain. She continues to perform a home exercise routine. She denies any other health changes. Her pain today is 8/10.    Interval History 7/23/2024:  The patient returns to clinic today for follow up of pain via virtual visit. She reports increased right shoulder pain over the few days. She was reaching down to get something off the floor. She felt something give in her shoulder. This pain felt similar to her pain before rotator cuff surgery. She did take Flexeril and 1/2 a Norco last night with benefit. The pain is much improved today. She also reports increased mid back pain over the last 2 months. She reports mid back pain that radiates into her ribs, right greater than left. She is performing home exercise. She denies any other health changes.     Interval History 4/3/2024:  Patient returns to clinic for follow up of neck and back pain. She is  "s/p TPI's on 2/8/24, which she reports has given her 100% percent relief for 2 months. Patient expresses that bilateral neck pain has returned + mid back pain. Patient expresses that with increased acitivity thorugh the day the muscles of the neck feel tighter. Patient expresses she continues to do home exercises + weights, and pickleball, feels like the exercises have not improved her pain.  Patient is not taking robaxin 500mg TID, she expresses taking flexeril at night as needed, and utilizes a heating pad. Patient states her current pain level is 4/10. Patient describes back pain has returned x 1 month, now is localized to the center of the back without radiation.     Interval History 3/5/2024:  The patient has a virtual follow up for neck and back pain. She is s/p TPIs for neck pain which she says helped 100% for that pain. Her primary complaint today is middle back pain. She had thoracic SANJUANA in the past but feels like this is slightly higher. However, she says that it is tolerable at this time. She completed PT today and says that she plans to keep up with her home exercises. Her overall pain today is 5/10.    Interval History 1/10/2024:  Rebecca Johnston Zollinger returns to clinic today s/p Right T12/L1 and L1/L2 TA SANJUANA on 12/18/2023.  She reports 85% relief that is ongoing.  She is back to walking 2 miles again.  She does find that when she exerts herself to her full extent that the pain returns slightly.  Today she continues to complain of neck pain from her previous visit and of new mid back pain.  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  She states that the pain is located bilaterally at the base of her skull and into her neck, left greater than right.  She describes the pain as pressure and it feels like "a clamp". Sometimes she has a headache by the end of the day.  The mid back pain is occasionally present in " the AM and is relieve by stretching.  It is exacerbated by aerobic activity and pickleball.  The pain will wrap around right side of ribs and under her breast. .  She has not started  PT yet-she was supposed to start yesterday, but the appointment was cancelled due to weather. Denies bowel and bladder dysfunction, fever, chills, nightsweats or weight changes. She continues Robaxin Pain today is 5/10.    She denies any new weakness, denies fevers, numbness, saddle anesthesia or incontinence.     Interval History 12/15/2023:  The patient returns to clinic today for follow up of back pain. She reports increased neck pain over the last month. She reports neck pain and pain at the base of her head, left side greater than right. This pain is worse with bending forward. She has tried Excedrin and Flexeril with limited relief. She denies any radicular arm pain. She continues to report right sided mid and low back pain. She does have radiating pain into the right groin and anterior thigh. She is scheduled for SANJUANA next week. She continues to perform a home exercise routine. She denies any other health changes. Her pain today is 8/10.     Interval History 11/21/2023:  The patient returns to clinic today for follow up of back pain via virtual visit. She is here today for imaging review. She reports worsened right sided mid and low back pain. This radiates into the right groin and anterior thigh. She denies any left leg pain. Her pain is worse with prolonged walking. She is performing a home exercise routine. She denies any other health changes.     Interval History 8/28/2023:  Rebecca Johnston Zollinger presents to the clinic for a follow-up appointment for back pain via virtual visit. She is s/p right T12/L1 TF SANJUANA on 8/14/2023. She reports 100% relief of her mid back pain. She continues to report low back pain that radiates into her groin bilaterally, right greater than left. This is worse with prolonged walking. She is no  longer able to walk 4 miles, which is her typical exercise routine. She has completed multiple rounds of PT. She denies any other health changes.       Original HPI:  67 year old female who presents with Right sided lower back pain. 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 The pain is located in her R back and goes to the R hip and groin when she walks. At worst it is a 10/10, baseline is a 3/10. She describes it as aching. She used to be able to walk 4 miles, now she states she can only walk 1 mile without pain. Last saw pain management at  2 years ago, no interventions performed. She has tried multiple rounds of PT, most recently in 2022 with some improvement, completed over 6 weeks of PT. She takes occasional ibuprofen and tylenol. Used to take tramadol and opioids but is no longer taking. She has had an MRI back in 2021 showing protruding disc at T12-L1. She denies any new weakness, denies fevers, numbness, saddle anesthesia or incontinence.     Pain Disability Index Review:      11/19/2024     2:21 PM 9/13/2024    10:08 AM 4/3/2024     8:09 AM   Last 3 PDI Scores   Pain Disability Index (PDI) 14 [phone]       Pain Medications:  None    Opioid Contract: not applicable     report:  Not applicable    Pain Procedures:   8/14/2023- Right T12/L1 TF SANJUANA  12/18/2023- Right T12/L1 and L1/2 TF SANJUANA- 85% relief  8/29/2024- Right T12/L1 and L1/2 TF SANJUANA- 100% relief  10/10/2024- C7/T1 IL SANJUANA- 95% relief   11/4/2024- Right T7/8 TF SANJUANA- 100% relief    Physical Therapy/Home Exercise: yes    Imaging:  MRI Cervical Spine 9/26/2024:  COMPARISON:  12/15/2023     FINDINGS:  Alignment: Normal.     Vertebrae: Normal marrow signal. No fracture.     Discs: Disc space narrowing present C5-C6-C7 with marginal osteophytosis     Cord: Normal.     Skull base and craniocervical junction: Normal.     Degenerative findings:     C2-C3: There is no focal  disc herniation.No significant central canal narrowing.  No significant neural foraminal narrowing.     C3-C4: There is no focal disc herniation.No significant central canal narrowing.  Mild LEFT neural foraminal narrowing.     C4-C5: There is no focal disc herniation.No significant central canal narrowing.  Moderate LEFT neural foraminal narrowing.     C5-C6: Posterior marginal osteophytic disc spur complex.There is no focal disc herniation.No significant central canal narrowing.  Moderate-severe RIGHT mild LEFT neural foraminal narrowing.     C6-C7: Posterior marginal osteophytic disc spur complex.There is no focal disc herniation.No significant central canal narrowing.  Moderate-severe LEFT mild RIGHT neural foraminal narrowing.     C7-T1: There is no focal disc herniation.No significant central canal narrowing.  No significant neural foraminal narrowing.     Paraspinal muscles & soft tissues: Multinodular goiter with dominant nodule RIGHT thyroid lobe, hyperintense measuring 2.1 cm.  Thyroid ultrasound recommended..     Impression:     Multilevel lumbar spondylosis most evident C5-C7 disc space levels with marginal osteophytosis and uncovertebral hypertrophy resulting in associated neural foraminal encroachment as detailed above.     Multiple thyroid nodules largest of which of RIGHT thyroid lobe 2.1 cm.  Routine thyroid ultrasound recommended for further evaluation.    MRI Thoracic Spine 9/26/2024:  COMPARISON:  None.     FINDINGS:  Alignment: The thoracic spine demonstrates proper alignment.     Vertebra: The vertebral bodies show normal signal intensity and height with no indication of acute fracture or pathologic marrow replacement process.  Suspect incidental hemangioma T10 with cortical endplate degenerative changes most evident T7-T8 through T9-T10     Disc: Multilevel disc space narrowing and desiccation with posterior marginal osteophytic spurring.     Cord: The demonstrated portion of the spinal cord  is normal in signal intensity at all levels with no indication of myelomalacia or cord edema.     Evaluation of the surrounding soft tissue structures demonstrates no acute abnormality.  As noted in the cervical portion of the report, multiple nodules within the thyroid for which routine thyroid ultrasound recommended.     Degenerative findings: There is a extradural soft tissue density at the T12 level lateralizing RIGHT with mass effect upon the thecal sac.  This is associated with a RIGHT foraminal disc from the T12-L1 level.  These findings are identified on series 7 image 55 and series 7, image 53, confirmed on sagittal series 4, image 8. there is associated mass effect upon the thecal sac yet no evidence for cord deviation.     Impression:     RIGHT foraminal disc protrusion T12-L1 with extruded disc material RIGHT extradural T12 level      XR CERVICAL SPINE 5 VIEW WITH FLEX AND EXT-12/15/2023  COMPARISON:  None.     FINDINGS:  Vertebral body heights are maintained.     Disc space narrowing and endplate changes C5-6 and C6-7.  Small posterior osteophytes at these levels.     Oblique views show bony narrowing of the neural foramina C5-6 on the right and C4-5 and C5-6 on the left.     Reversal of the normal cervical lordosis in the lower cervical spine.  Otherwise, AP alignment appears anatomic.     No significant prevertebral soft tissue edema.     Impression:     Degenerative change as above.       MRI Lumbar Spine 9/22/2023:  COMPARISON:  None.     FINDINGS:  Alignment: Grade 1 retrolisthesis of L5-S1.  Straightening of lumbar lordosis.     Vertebrae: Multilevel degenerative endplate changes no fracture or marrow infiltrative process.     Discs: Moderate to severe disc height loss noted throughout the lumbar spine.  No evidence for discitis.     Cord: Conus terminates at L1-L2 and appears unremarkable.  Cauda equina appears unremarkable.     Degenerative findings:     T11-T12: Right paracentral disc extrusion  results in moderate effacement of the right lateral recess and mass effect upon the right T12 nerve root.     T12-L1: Circumferential disc bulge with right paracentral disc extrusion result in mild effacement of the right lateral recess.     L1-L2: Circumferential disc bulge and mild facet arthropathy result in mild bilateral neural foraminal narrowing.     L2-L3: Circumferential disc bulge and mild facet arthropathy result in mild left neural foraminal narrowing.     L3-L4: Circumferential disc bulge and moderate facet arthropathy result in mild spinal canal stenosis and mild bilateral neural foraminal narrowing.     L4-L5: Circumferential disc bulge and mild facet arthropathy result in mild effacement of the lateral recesses and mild bilateral neural foraminal narrowing peer     L5-S1: Circumferential disc bulge and mild facet arthropathy result in moderate right, mild left neural foraminal narrowing.     Paraspinal muscles & soft tissues: Moderate paraspinal muscle atrophy.  4.0 cm left renal cyst.     Impression:     1. Multilevel degenerative changes of the lumbar and lower thoracic spine as detailed above.     Xray Hip 7/5/2023:  FINDINGS:  No acute fractures.  Some degenerative changes visualized lower lumbar spine to include endplate osteophytes and lumbosacral disc narrowing and possible vacuum phenomenon.  Intact right and left SI joints.  No definite narrowing of right or left hip joint spaces or acetabular spurring.  Preserved right and left femoral head contours.     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 Hives    Protease-amylase equip  Hives     Tide detergent       Current Medications:   Current Outpatient Medications   Medication Sig  Dispense Refill    alendronate (FOSAMAX) 70 MG tablet Take 70 mg by mouth every 7 days.      aspirin (ECOTRIN) 81 MG EC tablet Take 81 mg by mouth once daily.      buPROPion (WELLBUTRIN XL) 150 MG TB24 tablet Take 1 tablet by mouth every morning.      celecoxib (CELEBREX) 200 MG capsule Take 1 capsule (200 mg total) by mouth once daily. 30 capsule 1    cycloSPORINE (RESTASIS) 0.05 % ophthalmic emulsion Place 1 drop into both eyes 3 (three) times daily.      estradioL (ESTRACE) 0.01 % (0.1 mg/gram) vaginal cream Place vaginally once daily.      FLUoxetine 20 MG capsule Take 1 capsule by mouth once daily.      hydrocodone-acetaminophen (HYCET) solution 7.5-325 mg/15mL Take by mouth 4 (four) times daily as needed for Pain.      ipratropium (ATROVENT) 42 mcg (0.06 %) nasal spray 2 sprays by Each Nostril route 4 (four) times daily.      nitrofurantoin, macrocrystal-monohydrate, (MACROBID) 100 MG capsule Take 100 mg by mouth 2 (two) times daily.      pantoprazole (PROTONIX) 40 MG tablet Take 1 tablet by mouth once daily.      phenazopyridine (PYRIDIUM) 200 MG tablet Take 200 mg by mouth as needed for Pain.      prazosin (MINIPRESS) 1 MG Cap Take 2 capsules by mouth every evening.      rosuvastatin (CRESTOR) 5 MG tablet Take 1 tablet by mouth every evening.      tiZANidine (ZANAFLEX) 2 MG tablet 1 at bedtime for 3 nights then 2 every night for 3 nights then 3 every night to follow. Stay at the most comfortable dose. 90 tablet 2     No current facility-administered medications for this visit.       REVIEW OF SYSTEMS:    GENERAL:  No weight loss, malaise or fevers.  HEENT:  Negative for frequent or significant headaches.  NECK:  Negative for lumps, goiter, pain and significant neck swelling.  RESPIRATORY:  Negative for cough, wheezing or shortness of breath.  CARDIOVASCULAR:  Negative for chest pain, leg swelling or palpitations. HTN  GI:  Negative for abdominal discomfort, blood in stools or black stools or change in bowel  habits.  MUSCULOSKELETAL:  See HPI.  SKIN:  Negative for lesions, rash, and itching.  PSYCH:  Negative for sleep disturbance, mood disorder and recent psychosocial stressors.  HEMATOLOGY/LYMPHOLOGY:  Negative for prolonged bleeding, bruising easily or swollen nodes.  NEURO:   No history of headaches, syncope, paralysis, seizures or tremors.  All other reviewed and negative other than HPI.    Past Medical History:  Past Medical History:   Diagnosis Date    Anticoagulant long-term use     Hypertension        Past Surgical History:  Past Surgical History:   Procedure Laterality Date    EPIDURAL STEROID INJECTION N/A 10/10/2024    Procedure: CERVICAL C7/T1 IL SANJUANA *ASPIRIN OTC* HOLD FOR 5 DAYS  **EISSA PT**;  Surgeon: Devon Gamble MD;  Location: [facility];  Service: Pain Management;  Laterality: N/A;  [phone]  2 WK F/U DEEPALI    TRANSFORAMINAL EPIDURAL INJECTION OF STEROID Right 8/14/2023    Procedure: INJECTION, STEROID, EPIDURAL, TRANSFORAMINAL APPROACH, RIGHT T12/L1 SOONER DATE;  Surgeon: Isi Womack MD;  Location: [facility];  Service: Pain Management;  Laterality: Right;    TRANSFORAMINAL EPIDURAL INJECTION OF STEROID Right 12/18/2023    Procedure: LUMBAR TRANSFORAMINAL RIGHT T12/L1 AND L1/2;  Surgeon: Isi Womack MD;  Location: [facility];  Service: Pain Management;  Laterality: Right;  [phone]  2 WK F/U DEEPALI    TRANSFORAMINAL EPIDURAL INJECTION OF STEROID Right 8/29/2024    Procedure: THORACIC TRANSFORAMINAL RIGHT T12/L1 AND L1/2 *ASPIRIN OTC* HOLD FOR 5 DAYS;  Surgeon: Isi Womack MD;  Location: [facility];  Service: Pain Management;  Laterality: Right;  [phone]  2 WK F/U DEEPALI    TRANSFORAMINAL EPIDURAL INJECTION OF STEROID Right 11/4/2024    Procedure: THORACIC TRANSFORAMINAL RIGHT T7/8 *ASPIRIN OTC* HOLD FOR 5 DAYS;  Surgeon: Isi Womack MD;  Location: [facility];  Service: Pain Management;  Laterality: Right;  [phone]  2 WK F/U DEEPALI       Family History:  No family  history on file.    Social History:  Social History     Socioeconomic History    Marital status:    Tobacco Use    Smoking status: Former     Types: Cigarettes     Passive exposure: Past    Smokeless tobacco: Never    Tobacco comments:     Quit 20 years ago   Substance and Sexual Activity    Alcohol use: Not Currently    Drug use: Never    Sexual activity: Not Currently     Social Drivers of Health     Financial Resource Strain: Low Risk  (10/9/2024)    Received from [facility]    Overall Financial Resource Strain (CARDIA)     Difficulty of Paying Living Expenses: Not hard at all   Food Insecurity: No Food Insecurity (10/9/2024)    Received from [facility]    Hunger Vital Sign     Worried About Running Out of Food in the Last Year: Never true     Ran Out of Food in the Last Year: Never true   Transportation Needs: No Transportation Needs (10/9/2024)    Received from [facility]    PRAPARE - Transportation     Lack of Transportation (Medical): No     Lack of Transportation (Non-Medical): No   Physical Activity: Sufficiently Active (10/9/2024)    Received from [facility]    Exercise Vital Sign     Days of Exercise per Week: 5 days     Minutes of Exercise per Session: 60 min   Stress: Stress Concern Present (10/9/2024)    Received from [facility]    [employer] - Occupational Stress Questionnaire     Feeling of Stress : To some extent   Housing Stability: Unknown (10/9/2024)    Received from [facility]    Housing Stability Vital Sign     Unable to Pay for Housing in the Last Year: No       OBJECTIVE:    Exam limited due to virtual visit:  General appearance: Well appearing, in no acute distress, alert and oriented x3.  Psych:  Mood and affect appropriate.    Previous physical exam:  There were no vitals filed for this visit.      PHYSICAL EXAMINATION:    General appearance: Well appearing, in no acute distress, alert and oriented x3.  Psych:  Mood and affect appropriate.  Skin:  Skin color, texture, turgor normal, no rashes or lesions, in both upper and lower body.  Head/face:  Atraumatic, normocephalic.    Cor: Capillary refill <3 seconds.  Pulm: Symmetric chest rise, no respiratory distress noted.   Back:  Normal ROM   Extremities: No deformities, edema, or skin discoloration.   Musculoskeletal:  Bilateral upper extremity strength is normal and symmetric.  No atrophy or tone abnormalities are noted.  Neuro:  No loss of sensation is noted.    Gait: Normal.    ASSESSMENT: 69 y.o. year old female with back and neck pain, consistent with the followin. Thoracic radiculopathy  Procedure Order to Pain Management      2. Thoracic spine pain                PLAN:     - Previous imaging reviewed.     - Schedule for left T7/8 TF SANJUANA.      - Continue current medications.     - Continue home exercise routine.    - RTC 2 weeks after above procedure.       The above plan and management options were discussed at length with patient. Patient is in agreement with the above and verbalized understanding.    Maggy Weeks NP  2025

## 2025-01-31 ENCOUNTER — PATIENT MESSAGE (OUTPATIENT)
Dept: PAIN MEDICINE | Facility: OTHER | Age: 70
End: 2025-01-31
Payer: MEDICARE

## 2025-02-05 ENCOUNTER — PATIENT MESSAGE (OUTPATIENT)
Dept: SPORTS MEDICINE | Facility: CLINIC | Age: 70
End: 2025-02-05
Payer: MEDICARE

## 2025-02-17 ENCOUNTER — PATIENT MESSAGE (OUTPATIENT)
Dept: SPORTS MEDICINE | Facility: CLINIC | Age: 70
End: 2025-02-17
Payer: MEDICARE

## 2025-02-17 ENCOUNTER — HOSPITAL ENCOUNTER (OUTPATIENT)
Facility: OTHER | Age: 70
Discharge: HOME | End: 2025-02-17
Attending: ANESTHESIOLOGY | Admitting: ANESTHESIOLOGY
Payer: MEDICARE

## 2025-02-17 VITALS
HEART RATE: 64 BPM | DIASTOLIC BLOOD PRESSURE: 60 MMHG | SYSTOLIC BLOOD PRESSURE: 134 MMHG | TEMPERATURE: 98 F | BODY MASS INDEX: 29.02 KG/M2 | RESPIRATION RATE: 18 BRPM | HEIGHT: 64 IN | OXYGEN SATURATION: 92 % | WEIGHT: 170 LBS

## 2025-02-17 DIAGNOSIS — M54.14 THORACIC RADICULOPATHY: Primary | ICD-10-CM

## 2025-02-17 DIAGNOSIS — G89.29 CHRONIC PAIN: ICD-10-CM

## 2025-02-17 PROCEDURE — 25500020 PHARM REV CODE 255: Performed by: ANESTHESIOLOGY

## 2025-02-17 PROCEDURE — 64479 NJX AA&/STRD TFRM EPI C/T 1: CPT | Mod: LT | Performed by: ANESTHESIOLOGY

## 2025-02-17 PROCEDURE — 63600175 PHARM REV CODE 636 W HCPCS: Performed by: ANESTHESIOLOGY

## 2025-02-17 RX ORDER — SODIUM CHLORIDE 9 MG/ML
INJECTION, SOLUTION INTRAVENOUS CONTINUOUS
Status: DISCONTINUED | OUTPATIENT
Start: 2025-02-17 | End: 2025-02-17 | Stop reason: HOSPADM

## 2025-02-17 RX ORDER — MIDAZOLAM HYDROCHLORIDE 1 MG/ML
INJECTION INTRAMUSCULAR; INTRAVENOUS
Status: DISCONTINUED | OUTPATIENT
Start: 2025-02-17 | End: 2025-02-17 | Stop reason: HOSPADM

## 2025-02-17 RX ORDER — DEXAMETHASONE SODIUM PHOSPHATE 10 MG/ML
INJECTION, SOLUTION INTRA-ARTICULAR; INTRALESIONAL; INTRAMUSCULAR; INTRAVENOUS; SOFT TISSUE
Status: DISCONTINUED | OUTPATIENT
Start: 2025-02-17 | End: 2025-02-17 | Stop reason: HOSPADM

## 2025-02-17 RX ORDER — LIDOCAINE HYDROCHLORIDE 20 MG/ML
INJECTION, SOLUTION INFILTRATION; PERINEURAL
Status: DISCONTINUED | OUTPATIENT
Start: 2025-02-17 | End: 2025-02-17 | Stop reason: HOSPADM

## 2025-02-17 RX ORDER — LIDOCAINE HYDROCHLORIDE 10 MG/ML
INJECTION, SOLUTION EPIDURAL; INFILTRATION; INTRACAUDAL; PERINEURAL
Status: DISCONTINUED | OUTPATIENT
Start: 2025-02-17 | End: 2025-02-17 | Stop reason: HOSPADM

## 2025-02-17 NOTE — OP NOTE
Thoracic Transforaminal Epidural Steroid Injection  Left  T7/8 under Fluoroscopic Guidance    The procedure, risks, benefits, and options were discussed with the patient. There are no contraindications to the procedure. The patent expressed understanding and agreed to the procedure. Informed written consent was obtained prior to the start of the procedure and can be found in the patient's chart.    PATIENT NAME: Rebecca Zollinger   MRN: [id number]     DATE OF PROCEDURE: 02/17/2025    PROCEDURE: Thoracic Transforaminal Epidural Steroid Injection Left  T7/8 under Fluoroscopic Guidance    PRE-OP DIAGNOSIS: Thoracic radiculopathy [M54.14] Thoracic radiculopathy [M54.14]    POST-OP DIAGNOSIS: Same    PHYSICIAN: Isi Womack MD    ASSISTANTS: Savannah Cunningham DO     MEDICATIONS INJECTED: Preservative-free Decadron 10mg with 1cc of Lidocaine 1% MPF     LOCAL ANESTHETIC INJECTED: Xylocaine 2%     SEDATION: Versed 2mg and Fentanyl 0mcg                                                                                                                                                                                     Conscious sedation ordered by M.DSirena Patient re-evaluation prior to administration of conscious sedation. No changes noted in patient's status from initial evaluation. The patient's vital signs were monitored by RN and patient remained hemodynamically stable throughout the procedure.    Event Time In   Sedation Start 1407   Sedation End 1414       ESTIMATED BLOOD LOSS: None    COMPLICATIONS: None    TECHNIQUE: Time-out was performed to identify the patient and procedure to be performed. With the patient laying in the prone position, the surgical area was prepped and draped in the usual sterile fashion using ChloraPrep and a fenestrated drape. The levels were determined under fluoroscopy guidance. Skin anesthesia was achieved by injecting Lidocaine 2% over the injection sites. The transforaminal spaces were then approached  with a 25 gauge, 3.5 inch spinal quinke needle that was introduced under fluoroscopic guidance in the AP and Lateral views. Once the needle tip was in the area of the transforaminal space, and there was no blood, CSF or paraesthesias, contrast dye Omnipaque (300mg/mL) was injected to confirm placement and there was no vascular runoff. Fluoroscopic imaging in the AP and lateral views revealed a clear outline of the spinal nerve with proximal spread of agent through the neural foramen into the epidural space. 3 mL of the medication mixture listed above was injected slowly. Displacement of the radio opaque contrast after injection of the medication confirmed that the medication went into the area of the transforaminal spaces. The needles were removed and bleeding was nil. A sterile dressing was applied. No specimens collected. The patient tolerated the procedure well.     PRE-PROCEDURE PAIN SCORE: 8/10    POST-PROCEDURE PAIN SCORE: 0/10    The patient was monitored after the procedure in the recovery area. They were given post-procedure and discharge instructions to follow at home. The patient was discharged in a stable condition.    Savannah Cunningham MD     I reviewed and edited the fellow's note. I conducted my own interview and physical examination. I agree with the findings. I was present and supervising all critical portions of the procedure.

## 2025-02-17 NOTE — DISCHARGE INSTRUCTIONS

## 2025-02-17 NOTE — DISCHARGE SUMMARY
Discharge Note  Short Stay      SUMMARY     Admit Date: 2/17/2025    Attending Physician: Isi Womack MD    Discharge Physician: Isi Womack MD      Discharge Date: 2/17/2025 2:07 PM    Procedure(s) (LRB):  THORACIC TRANSFORAMINAL LEFT T7/8 *ASPIRIN OTC* (Left)    Final Diagnosis:  1. Thoracic radiculopathy    2. Chronic pain           Disposition: Home or self care    Patient Instructions:   Current Discharge Medication List        CONTINUE these medications which have NOT CHANGED    Details   alendronate (FOSAMAX) 70 MG tablet Take 70 mg by mouth every 7 days.      aspirin (ECOTRIN) 81 MG EC tablet Take 81 mg by mouth once daily.      buPROPion (WELLBUTRIN XL) 150 MG TB24 tablet Take 1 tablet by mouth every morning.      celecoxib (CELEBREX) 200 MG capsule Take 1 capsule (200 mg total) by mouth once daily.  Qty: 30 capsule, Refills: 1    Associated Diagnoses: Osteoarthritis of right glenohumeral joint      cycloSPORINE (RESTASIS) 0.05 % ophthalmic emulsion Place 1 drop into both eyes 3 (three) times daily.      estradioL (ESTRACE) 0.01 % (0.1 mg/gram) vaginal cream Place vaginally once daily.      FLUoxetine 20 MG capsule Take 1 capsule by mouth once daily.      hydrocodone-acetaminophen (HYCET) solution 7.5-325 mg/15mL Take by mouth 4 (four) times daily as needed for Pain.      ipratropium (ATROVENT) 42 mcg (0.06 %) nasal spray 2 sprays by Each Nostril route 4 (four) times daily.      nitrofurantoin, macrocrystal-monohydrate, (MACROBID) 100 MG capsule Take 100 mg by mouth 2 (two) times daily.      pantoprazole (PROTONIX) 40 MG tablet Take 1 tablet by mouth once daily.      phenazopyridine (PYRIDIUM) 200 MG tablet Take 200 mg by mouth as needed for Pain.      prazosin (MINIPRESS) 1 MG Cap Take 2 capsules by mouth every evening.      rosuvastatin (CRESTOR) 5 MG tablet Take 1 tablet by mouth every evening.      tiZANidine (ZANAFLEX) 2 MG tablet 1 at bedtime for 3 nights then 2 every night for 3 nights then 3  every night to follow. Stay at the most comfortable dose.  Qty: 90 tablet, Refills: 2                 Discharge Diagnosis: Same as above  Condition on Discharge: Stable with no complications to procedure   Diet on Discharge: Same as before.  Activity: as per instruction sheet.  Discharge to: Home with a responsible adult.  Follow up: 2-4 weeks       Please call my office or pager at [phone] if experienced any weakness or loss of sensation, fever > 101.5, pain uncontrolled with oral medications, persistent nausea/vomiting/or diarrhea, redness or drainage from the incisions, or any other worrisome concerns. If physician on call was not reached or could not communicate with our office for any reason please go to the nearest emergency department     Savannah Cunningham MD

## 2025-02-18 DIAGNOSIS — M79.18 MYOFASCIAL PAIN: ICD-10-CM

## 2025-02-18 RX ORDER — METHOCARBAMOL 500 MG/1
TABLET, FILM COATED ORAL
Qty: 30 TABLET | Refills: 0 | Status: SHIPPED | OUTPATIENT
Start: 2025-02-18

## 2025-02-19 ENCOUNTER — PATIENT MESSAGE (OUTPATIENT)
Dept: PAIN MEDICINE | Facility: CLINIC | Age: 70
End: 2025-02-19
Payer: MEDICARE

## 2025-03-05 ENCOUNTER — OFFICE VISIT (OUTPATIENT)
Dept: SPINE | Facility: CLINIC | Age: 70
End: 2025-03-05
Payer: MEDICARE

## 2025-03-05 DIAGNOSIS — M54.6 THORACIC SPINE PAIN: Primary | ICD-10-CM

## 2025-03-05 DIAGNOSIS — M47.812 CERVICAL SPONDYLOSIS: ICD-10-CM

## 2025-03-05 DIAGNOSIS — M54.12 CERVICAL RADICULOPATHY: ICD-10-CM

## 2025-03-05 PROCEDURE — 4010F ACE/ARB THERAPY RXD/TAKEN: CPT | Mod: CPTII,95,, | Performed by: NURSE PRACTITIONER

## 2025-03-05 PROCEDURE — 1160F RVW MEDS BY RX/DR IN RCRD: CPT | Mod: CPTII,95,, | Performed by: NURSE PRACTITIONER

## 2025-03-05 PROCEDURE — 1159F MED LIST DOCD IN RCRD: CPT | Mod: CPTII,95,, | Performed by: NURSE PRACTITIONER

## 2025-03-05 PROCEDURE — 98004 SYNCH AUDIO-VIDEO EST SF 10: CPT | Mod: 95,,, | Performed by: NURSE PRACTITIONER

## 2025-03-05 PROCEDURE — 3044F HG A1C LEVEL LT 7.0%: CPT | Mod: CPTII,95,, | Performed by: NURSE PRACTITIONER

## 2025-03-05 NOTE — PROGRESS NOTES
Chronic Patient Established Note   Chronic Pain-Tele-Medicine-Established Note (Follow up visit)        The patient location is: Home  The chief complaint leading to consultation is: pain  Visit type: Virtual visit with synchronous audio and video  Total time spent with patient: 25 min  Each patient to whom he or she provides medical services by telemedicine is:  (1) informed of the relationship between the physician and patient and the respective role of any other health care provider with respect to management of the patient; and (2) notified that he or she may decline to receive medical services by telemedicine and may withdraw from such care at any time.          SUBJECTIVE:    Interval History 3/5/2025:  The patient returns to clinic today for follow up of back pain via virtual visit. She is s/p left T7/8 TF SANJUANA on 2/17/2025. She reports 50% relief of her mid back pain. She reports intermittent left sided mid back pain. She describes this as a knot. She is having shoulder replacement in 2 weeks. She denies any other health changes.     Interval History 1/30/2025:  The patient returns to clinic today for follow up of neck and back pain via virtual visit. She reports increased left sided mid back pain that radiates into the left ribs. This is similar to the right sided pain she had in October. This is worse with taking deep breaths. She is scheduled for upcoming shoulder replacement. She would like to try an injection prior to aid with recovery. She continues to perform a home exercise routine. She denies any other health changes.     Interval History 11/19/2024:  The patient returns to clinic today for follow up of neck and back pain. She is s/p right T7/8 TF SANJUANA on 11/4/2024. She reports 100% relief of her pain. Her rib pain has resolved. She reports intermittent mid back pain lower than injection site. This is tolerable at this time. She is performing a home exercise routine. She denies any other health  Patient called the office requesting paper work completed by Kathya Coleman. Patient requesting to speak with Kallie Montalvo.    Please advise changes. Her pain today is 2/10.    Interval History 10/24/2024:  The patient returns to clinic today for follow up of neck and back pain. She is s/p C7/T1 IL SANJUANA on 10/10/2024. She reports 95% relief of her neck pain. She reports intermittent neck pain on the left. This is tolerable at this time. She is scheduled for thoracic injection next week. She continues to report mid back pain that is in between the shoulders blades that radiates into the right. She is performing a home exercise routine. She denies any other health changes. Her pain today is 0/10.    Interval History 10/2/2024:  The patient returns to clinic today for follow up of neck and mid back pain. She is here today for imaging review. She continues to report neck pain that radiates into the shoulder. She also notes headaches. She denies any radicular arm pain. Her pain is worse with turning her head and activity. She is performing a home exercise routine. She denies any other health changes. Her pain today is 2/10.     Interval History 9/13/2024:  The patient returns to clinic today for follow up of pain. She is s/p right T12/L1 and L1/2 TF SANJUANA on 8/29/2024. She reports 100% relief of her back pain. She reports increased neck pain. She describes this pain as pressure. She does endorse head pain at the base that radiates forward. She also reports pain that radiates into the shoulder blade and around the rib. Her pain is worse with activity. She denies any radicular arm pain. She continues to perform a home exercise routine. She denies any other health changes. Her pain today is 8/10.    Interval History 7/23/2024:  The patient returns to clinic today for follow up of pain via virtual visit. She reports increased right shoulder pain over the few days. She was reaching down to get something off the floor. She felt something give in her shoulder. This pain felt similar to her pain before rotator cuff surgery. She did take Flexeril and 1/2 a Norco last  night with benefit. The pain is much improved today. She also reports increased mid back pain over the last 2 months. She reports mid back pain that radiates into her ribs, right greater than left. She is performing home exercise. She denies any other health changes.     Interval History 4/3/2024:  Patient returns to clinic for follow up of neck and back pain. She is s/p TPI's on 2/8/24, which she reports has given her 100% percent relief for 2 months. Patient expresses that bilateral neck pain has returned + mid back pain. Patient expresses that with increased acitivity thorugh the day the muscles of the neck feel tighter. Patient expresses she continues to do home exercises + weights, and pickleball, feels like the exercises have not improved her pain.  Patient is not taking robaxin 500mg TID, she expresses taking flexeril at night as needed, and utilizes a heating pad. Patient states her current pain level is 4/10. Patient describes back pain has returned x 1 month, now is localized to the center of the back without radiation.     Interval History 3/5/2024:  The patient has a virtual follow up for neck and back pain. She is s/p TPIs for neck pain which she says helped 100% for that pain. Her primary complaint today is middle back pain. She had thoracic SANJUANA in the past but feels like this is slightly higher. However, she says that it is tolerable at this time. She completed PT today and says that she plans to keep up with her home exercises. Her overall pain today is 5/10.    Interval History 1/10/2024:  Rebecca Johnston Zollinger returns to clinic today s/p Right T12/L1 and L1/L2 TA SANJUANA on 12/18/2023.  She reports 85% relief that is ongoing.  She is back to walking 2 miles again.  She does find that when she exerts herself to her full extent that the pain returns slightly.  Today she continues to complain of neck pain from her previous visit and of new mid back pain.  She began making stained glass 3 years ago  "and flexes her head 2-3hrs/3-4 times per week and attributes some of the neck pain to this.  She states that the pain is located bilaterally at the base of her skull and into her neck, left greater than right.  She describes the pain as pressure and it feels like "a clamp". Sometimes she has a headache by the end of the day.  The mid back pain is occasionally present in the AM and is relieve by stretching.  It is exacerbated by aerobic activity and pickleball.  The pain will wrap around right side of ribs and under her breast. .  She has not started  PT yet-she was supposed to start yesterday, but the appointment was cancelled due to weather. Denies bowel and bladder dysfunction, fever, chills, nightsweats or weight changes. She continues Robaxin Pain today is 5/10.    She denies any new weakness, denies fevers, numbness, saddle anesthesia or incontinence.     Interval History 12/15/2023:  The patient returns to clinic today for follow up of back pain. She reports increased neck pain over the last month. She reports neck pain and pain at the base of her head, left side greater than right. This pain is worse with bending forward. She has tried Excedrin and Flexeril with limited relief. She denies any radicular arm pain. She continues to report right sided mid and low back pain. She does have radiating pain into the right groin and anterior thigh. She is scheduled for SANJUANA next week. She continues to perform a home exercise routine. She denies any other health changes. Her pain today is 8/10.     Interval History 11/21/2023:  The patient returns to clinic today for follow up of back pain via virtual visit. She is here today for imaging review. She reports worsened right sided mid and low back pain. This radiates into the right groin and anterior thigh. She denies any left leg pain. Her pain is worse with prolonged walking. She is performing a home exercise routine. She denies any other health changes.     Interval " History 8/28/2023:  Rebecca Johnston Zollinger presents to the clinic for a follow-up appointment for back pain via virtual visit. She is s/p right T12/L1 TF SANJUANA on 8/14/2023. She reports 100% relief of her mid back pain. She continues to report low back pain that radiates into her groin bilaterally, right greater than left. This is worse with prolonged walking. She is no longer able to walk 4 miles, which is her typical exercise routine. She has completed multiple rounds of PT. She denies any other health changes.       Original HPI:  67 year old female who presents with Right sided lower back pain. 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 The pain is located in her R back and goes to the R hip and groin when she walks. At worst it is a 10/10, baseline is a 3/10. She describes it as aching. She used to be able to walk 4 miles, now she states she can only walk 1 mile without pain. Last saw pain management at  2 years ago, no interventions performed. She has tried multiple rounds of PT, most recently in 2022 with some improvement, completed over 6 weeks of PT. She takes occasional ibuprofen and tylenol. Used to take tramadol and opioids but is no longer taking. She has had an MRI back in 2021 showing protruding disc at T12-L1. She denies any new weakness, denies fevers, numbness, saddle anesthesia or incontinence.     Pain Disability Index Review:      11/19/2024     2:21 PM 9/13/2024    10:08 AM 4/3/2024     8:09 AM   Last 3 PDI Scores   Pain Disability Index (PDI) 14 [phone]       Pain Medications:  None    Opioid Contract: not applicable     report:  Not applicable    Pain Procedures:   8/14/2023- Right T12/L1 TF SANJUANA  12/18/2023- Right T12/L1 and L1/2 TF SANJUANA- 85% relief  8/29/2024- Right T12/L1 and L1/2 TF SANJUANA- 100% relief  10/10/2024- C7/T1 IL SANJUANA- 95% relief   11/4/2024- Right T7/8 TF SANJUANA- 100% relief  2/17/2025- Left  T7/8 TF SANJUANA- 50% relief     Physical Therapy/Home Exercise: yes    Imaging:  MRI Cervical Spine 9/26/2024:  COMPARISON:  12/15/2023     FINDINGS:  Alignment: Normal.     Vertebrae: Normal marrow signal. No fracture.     Discs: Disc space narrowing present C5-C6-C7 with marginal osteophytosis     Cord: Normal.     Skull base and craniocervical junction: Normal.     Degenerative findings:     C2-C3: There is no focal disc herniation.No significant central canal narrowing.  No significant neural foraminal narrowing.     C3-C4: There is no focal disc herniation.No significant central canal narrowing.  Mild LEFT neural foraminal narrowing.     C4-C5: There is no focal disc herniation.No significant central canal narrowing.  Moderate LEFT neural foraminal narrowing.     C5-C6: Posterior marginal osteophytic disc spur complex.There is no focal disc herniation.No significant central canal narrowing.  Moderate-severe RIGHT mild LEFT neural foraminal narrowing.     C6-C7: Posterior marginal osteophytic disc spur complex.There is no focal disc herniation.No significant central canal narrowing.  Moderate-severe LEFT mild RIGHT neural foraminal narrowing.     C7-T1: There is no focal disc herniation.No significant central canal narrowing.  No significant neural foraminal narrowing.     Paraspinal muscles & soft tissues: Multinodular goiter with dominant nodule RIGHT thyroid lobe, hyperintense measuring 2.1 cm.  Thyroid ultrasound recommended..     Impression:     Multilevel lumbar spondylosis most evident C5-C7 disc space levels with marginal osteophytosis and uncovertebral hypertrophy resulting in associated neural foraminal encroachment as detailed above.     Multiple thyroid nodules largest of which of RIGHT thyroid lobe 2.1 cm.  Routine thyroid ultrasound recommended for further evaluation.    MRI Thoracic Spine 9/26/2024:  COMPARISON:  None.     FINDINGS:  Alignment: The thoracic spine demonstrates proper alignment.      Vertebra: The vertebral bodies show normal signal intensity and height with no indication of acute fracture or pathologic marrow replacement process.  Suspect incidental hemangioma T10 with cortical endplate degenerative changes most evident T7-T8 through T9-T10     Disc: Multilevel disc space narrowing and desiccation with posterior marginal osteophytic spurring.     Cord: The demonstrated portion of the spinal cord is normal in signal intensity at all levels with no indication of myelomalacia or cord edema.     Evaluation of the surrounding soft tissue structures demonstrates no acute abnormality.  As noted in the cervical portion of the report, multiple nodules within the thyroid for which routine thyroid ultrasound recommended.     Degenerative findings: There is a extradural soft tissue density at the T12 level lateralizing RIGHT with mass effect upon the thecal sac.  This is associated with a RIGHT foraminal disc from the T12-L1 level.  These findings are identified on series 7 image 55 and series 7, image 53, confirmed on sagittal series 4, image 8. there is associated mass effect upon the thecal sac yet no evidence for cord deviation.     Impression:     RIGHT foraminal disc protrusion T12-L1 with extruded disc material RIGHT extradural T12 level      XR CERVICAL SPINE 5 VIEW WITH FLEX AND EXT-12/15/2023  COMPARISON:  None.     FINDINGS:  Vertebral body heights are maintained.     Disc space narrowing and endplate changes C5-6 and C6-7.  Small posterior osteophytes at these levels.     Oblique views show bony narrowing of the neural foramina C5-6 on the right and C4-5 and C5-6 on the left.     Reversal of the normal cervical lordosis in the lower cervical spine.  Otherwise, AP alignment appears anatomic.     No significant prevertebral soft tissue edema.     Impression:     Degenerative change as above.       MRI Lumbar Spine 9/22/2023:  COMPARISON:  None.     FINDINGS:  Alignment: Grade 1 retrolisthesis  of L5-S1.  Straightening of lumbar lordosis.     Vertebrae: Multilevel degenerative endplate changes no fracture or marrow infiltrative process.     Discs: Moderate to severe disc height loss noted throughout the lumbar spine.  No evidence for discitis.     Cord: Conus terminates at L1-L2 and appears unremarkable.  Cauda equina appears unremarkable.     Degenerative findings:     T11-T12: Right paracentral disc extrusion results in moderate effacement of the right lateral recess and mass effect upon the right T12 nerve root.     T12-L1: Circumferential disc bulge with right paracentral disc extrusion result in mild effacement of the right lateral recess.     L1-L2: Circumferential disc bulge and mild facet arthropathy result in mild bilateral neural foraminal narrowing.     L2-L3: Circumferential disc bulge and mild facet arthropathy result in mild left neural foraminal narrowing.     L3-L4: Circumferential disc bulge and moderate facet arthropathy result in mild spinal canal stenosis and mild bilateral neural foraminal narrowing.     L4-L5: Circumferential disc bulge and mild facet arthropathy result in mild effacement of the lateral recesses and mild bilateral neural foraminal narrowing peer     L5-S1: Circumferential disc bulge and mild facet arthropathy result in moderate right, mild left neural foraminal narrowing.     Paraspinal muscles & soft tissues: Moderate paraspinal muscle atrophy.  4.0 cm left renal cyst.     Impression:     1. Multilevel degenerative changes of the lumbar and lower thoracic spine as detailed above.     Xray Hip 7/5/2023:  FINDINGS:  No acute fractures.  Some degenerative changes visualized lower lumbar spine to include endplate osteophytes and lumbosacral disc narrowing and possible vacuum phenomenon.  Intact right and left SI joints.  No definite narrowing of right or left hip joint spaces or acetabular spurring.  Preserved right and left femoral head contours.     Impression:     As  above    MRI 2021:  MRI of thoracic and lumbar spine reviewed from 2021  Our interpretation: Tarlov cysts in sacral spine, Extruding disc T12-L1 R sided, disc osteophyte complex T9-T10, T10-11    Allergies:   Review of patient's allergies indicates:   Allergen Reactions    I.n.h. Hives    Protease-amylase equip  Hives     Tide detergent       Current Medications:   Current Outpatient Medications   Medication Sig Dispense Refill    alendronate (FOSAMAX) 70 MG tablet Take 70 mg by mouth every 7 days.      aspirin (ECOTRIN) 81 MG EC tablet Take 81 mg by mouth once daily.      buPROPion (WELLBUTRIN XL) 150 MG TB24 tablet Take 1 tablet by mouth every morning.      celecoxib (CELEBREX) 200 MG capsule Take 1 capsule (200 mg total) by mouth once daily. 30 capsule 1    cycloSPORINE (RESTASIS) 0.05 % ophthalmic emulsion Place 1 drop into both eyes 3 (three) times daily.      estradioL (ESTRACE) 0.01 % (0.1 mg/gram) vaginal cream Place vaginally once daily.      FLUoxetine 20 MG capsule Take 1 capsule by mouth once daily.      hydrocodone-acetaminophen (HYCET) solution 7.5-325 mg/15mL Take by mouth 4 (four) times daily as needed for Pain.      ipratropium (ATROVENT) 42 mcg (0.06 %) nasal spray 2 sprays by Each Nostril route 4 (four) times daily.      methocarbamoL (ROBAXIN) 500 MG Tab TAKE 1 TABLET BY MOUTH THREE TIMES A DAY AS NEEDED MUSCLE PAIN 30 tablet 0    nitrofurantoin, macrocrystal-monohydrate, (MACROBID) 100 MG capsule Take 100 mg by mouth 2 (two) times daily.      pantoprazole (PROTONIX) 40 MG tablet Take 1 tablet by mouth once daily.      phenazopyridine (PYRIDIUM) 200 MG tablet Take 200 mg by mouth as needed for Pain.      prazosin (MINIPRESS) 1 MG Cap Take 2 capsules by mouth every evening.      rosuvastatin (CRESTOR) 5 MG tablet Take 1 tablet by mouth every evening.      tiZANidine (ZANAFLEX) 2 MG tablet 1 at bedtime for 3 nights then 2 every night for 3 nights then 3 every night to follow. Stay at the  most comfortable dose. 90 tablet 2     No current facility-administered medications for this visit.       REVIEW OF SYSTEMS:    GENERAL:  No weight loss, malaise or fevers.  HEENT:  Negative for frequent or significant headaches.  NECK:  Negative for lumps, goiter, pain and significant neck swelling.  RESPIRATORY:  Negative for cough, wheezing or shortness of breath.  CARDIOVASCULAR:  Negative for chest pain, leg swelling or palpitations. HTN  GI:  Negative for abdominal discomfort, blood in stools or black stools or change in bowel habits.  MUSCULOSKELETAL:  See HPI.  SKIN:  Negative for lesions, rash, and itching.  PSYCH:  Negative for sleep disturbance, mood disorder and recent psychosocial stressors.  HEMATOLOGY/LYMPHOLOGY:  Negative for prolonged bleeding, bruising easily or swollen nodes.  NEURO:   No history of headaches, syncope, paralysis, seizures or tremors.  All other reviewed and negative other than HPI.    Past Medical History:  Past Medical History:   Diagnosis Date    Anticoagulant long-term use     Hypertension        Past Surgical History:  Past Surgical History:   Procedure Laterality Date    EPIDURAL STEROID INJECTION N/A 10/10/2024    Procedure: CERVICAL C7/T1 IL SANJUANA *ASPIRIN OTC* HOLD FOR 5 DAYS  **GIOVANNI PT**;  Surgeon: Devon Gamlbe MD;  Location: [facility]IN [facility]GT;  Service: Pain Management;  Laterality: N/A;  [phone]  2 WK F/U DEEPALI    TRANSFORAMINAL EPIDURAL INJECTION OF STEROID Right 8/14/2023    Procedure: INJECTION, STEROID, EPIDURAL, TRANSFORAMINAL APPROACH, RIGHT T12/L1 SOONER DATE;  Surgeon: Isi Womack MD;  Location: [facility]GT;  Service: Pain Management;  Laterality: Right;    TRANSFORAMINAL EPIDURAL INJECTION OF STEROID Right 12/18/2023    Procedure: LUMBAR TRANSFORAMINAL RIGHT T12/L1 AND L1/2;  Surgeon: Isi Womack MD;  Location: [facility] PAIN T;  Service: Pain Management;  Laterality: Right;  [phone]  2 WK F/U DEEPALI    TRANSFORAMINAL EPIDURAL INJECTION OF STEROID Right  8/29/2024    Procedure: THORACIC TRANSFORAMINAL RIGHT T12/L1 AND L1/2 *ASPIRIN OTC* HOLD FOR 5 DAYS;  Surgeon: Isi Womack MD;  Location: [facility], [place], operated by [facility] PAIN [facility]GT;  Service: Pain Management;  Laterality: Right;  [phone]  2 WK F/U DEEPALI    TRANSFORAMINAL EPIDURAL INJECTION OF STEROID Right 11/4/2024    Procedure: THORACIC TRANSFORAMINAL RIGHT T7/8 *ASPIRIN OTC* HOLD FOR 5 DAYS;  Surgeon: Isi Womack MD;  Location: [facility], [place], operated by [facility]IN [facility]GT;  Service: Pain Management;  Laterality: Right;  [phone]  2 WK F/U DEEPALI    TRANSFORAMINAL EPIDURAL INJECTION OF STEROID Left 2/17/2025    Procedure: THORACIC TRANSFORAMINAL LEFT T7/8 *ASPIRIN OTC*;  Surgeon: Isi Womack MD;  Location: [facility], [place], operated by [facility] PAIN [facility]GT;  Service: Pain Management;  Laterality: Left;  2 WK F/U AMANDA       Family History:  No family history on file.    Social History:  Social History     Socioeconomic History    Marital status:    Tobacco Use    Smoking status: Former     Types: Cigarettes     Passive exposure: Past    Smokeless tobacco: Never    Tobacco comments:     Quit 20 years ago   Substance and Sexual Activity    Alcohol use: Not Currently    Drug use: Never    Sexual activity: Not Currently     Social Drivers of Health     Financial Resource Strain: Low Risk  (10/9/2024)    Received from [facility]    Overall Financial Resource Strain (CARDIA)     Difficulty of Paying Living Expenses: Not hard at all   Food Insecurity: No Food Insecurity (10/9/2024)    Received from [facility]    Hunger Vital Sign     Worried About Running Out of Food in the Last Year: Never true     Ran Out of Food in the Last Year: Never true   Transportation Needs: No Transportation Needs (10/9/2024)    Received from [facility]    PRAPARE - Transportation     Lack of Transportation (Medical): No     Lack of Transportation (Non-Medical): No   Physical Activity: Sufficiently Active (10/9/2024)    Received from [facility]    Exercise Vital Sign     Days of Exercise per Week: 5 days     Minutes of  Exercise per Session: 60 min   Stress: Stress Concern Present (10/9/2024)    Received from [facility] - Occupational Stress Questionnaire     Feeling of Stress : To some extent   Housing Stability: Unknown (10/9/2024)    Received from [facility]    Housing Stability Vital Sign     Unable to Pay for Housing in the Last Year: No       OBJECTIVE:    Exam limited due to virtual visit:  General appearance: Well appearing, in no acute distress, alert and oriented x3.  Psych:  Mood and affect appropriate.    Previous physical exam:  There were no vitals filed for this visit.      PHYSICAL EXAMINATION:    General appearance: Well appearing, in no acute distress, alert and oriented x3.  Psych:  Mood and affect appropriate.  Skin: Skin color, texture, turgor normal, no rashes or lesions, in both upper and lower body.  Head/face:  Atraumatic, normocephalic.    Cor: Capillary refill <3 seconds.  Pulm: Symmetric chest rise, no respiratory distress noted.   Back:  Normal ROM   Extremities: No deformities, edema, or skin discoloration.   Musculoskeletal:  Bilateral upper extremity strength is normal and symmetric.  No atrophy or tone abnormalities are noted.  Neuro:  No loss of sensation is noted.    Gait: Normal.    ASSESSMENT: 69 y.o. year old female with back and neck pain, consistent with the followin. Thoracic spine pain        2. Cervical radiculopathy        3. Cervical spondylosis                  PLAN:     - Previous imaging reviewed.     - She is s/p left T7/8 TF SANJUANA with benefit. We can repeat this as needed.       - Continue current medications.     - Continue home exercise routine.    - RTC PRN.        The above plan and management options were discussed at length with patient. Patient is in agreement with the above and verbalized understanding.    Maggy Weeks NP  2025

## 2025-03-07 ENCOUNTER — PATIENT MESSAGE (OUTPATIENT)
Dept: PREADMISSION TESTING | Facility: HOSPITAL | Age: 70
End: 2025-03-07
Payer: MEDICARE

## 2025-03-07 NOTE — ANESTHESIA PAT ROS NOTE
03/07/2025  Rebecca Johnston Zollinger is a 69 y.o., female.      Pre-op Assessment    I have reviewed the Patient Summary Reports.       I have reviewed the Medications.     Review of Systems  Social:  Former Smoker, No Alcohol Use Quit smoking 20 years ago.      Cardiovascular:     Hypertension           hyperlipidemia                               Hepatic/GI:     GERD                Musculoskeletal:     Osteoarthritis of right glenohumeral joint  Chronic right shoulder pain         Spine Disorders: thoracic and cervical            Endocrine:     prediabetes             Past Medical History:   Diagnosis Date    Anticoagulant long-term use     Hypertension      Past Surgical History:   Procedure Laterality Date    EPIDURAL STEROID INJECTION N/A 10/10/2024    Procedure: CERVICAL C7/T1 IL SANJUANA *ASPIRIN OTC* HOLD FOR 5 DAYS  **JENNIFERSA PT**;  Surgeon: Devon Gamble MD;  Location: [facility] PAIN [facility]GT;  Service: Pain Management;  Laterality: N/A;  [phone]  2 WK F/U DEEPALI    TRANSFORAMINAL EPIDURAL INJECTION OF STEROID Right 8/14/2023    Procedure: INJECTION, STEROID, EPIDURAL, TRANSFORAMINAL APPROACH, RIGHT T12/L1 SOONER DATE;  Surgeon: Isi Womack MD;  Location: [facility] PAIN MGT;  Service: Pain Management;  Laterality: Right;    TRANSFORAMINAL EPIDURAL INJECTION OF STEROID Right 12/18/2023    Procedure: LUMBAR TRANSFORAMINAL RIGHT T12/L1 AND L1/2;  Surgeon: Isi Womack MD;  Location: [facility] PAIN MGT;  Service: Pain Management;  Laterality: Right;  [phone]  2 WK F/U DEEPALI    TRANSFORAMINAL EPIDURAL INJECTION OF STEROID Right 8/29/2024    Procedure: THORACIC TRANSFORAMINAL RIGHT T12/L1 AND L1/2 *ASPIRIN OTC* HOLD FOR 5 DAYS;  Surgeon: Isi Womack MD;  Location: [facility] PAIN MGT;  Service: Pain Management;  Laterality: Right;  [phone]  2 WK F/U DEEPALI    TRANSFORAMINAL EPIDURAL INJECTION OF STEROID Right 11/4/2024     "Procedure: THORACIC TRANSFORAMINAL RIGHT T7/8 *ASPIRIN OTC* HOLD FOR 5 DAYS;  Surgeon: Isi Womack MD;  Location: [facility] PAIN MGT;  Service: Pain Management;  Laterality: Right;  [phone]  2 WK F/U DEEPALI    TRANSFORAMINAL EPIDURAL INJECTION OF STEROID Left 2/17/2025    Procedure: THORACIC TRANSFORAMINAL LEFT T7/8 *ASPIRIN OTC*;  Surgeon: Isi Womack MD;  Location: [facility] PAIN MGT;  Service: Pain Management;  Laterality: Left;  2 WK F/U AMANDA     Anesthesia Assessment: Preoperative EQUATION    Planned Procedure: Procedure(s) (LRB):  ARTHROPLASTY, SHOULDER, TOTAL, REVERSE (Right)  Requested Anesthesia Type:General  Surgeon: Emanuel Aguilar MD  Service: Orthopedics  Known or anticipated Date of Surgery:3/19/2025    Surgeon notes: reviewed    Electronic QUestionnaire Assessment completed via nurse interview with patient.      Triage considerations:     The patient has no apparent active cardiac condition (No unstable coronary Syndrome such as severe unstable angina or recent [<1 month] myocardial infarction, decompensated CHF, severe valvular   disease or significant arrhythmia)    Previous anesthesia records:Not available    Last PCP note: within 3 months   Subspecialty notes: Ortho, Pain Management, Spine Service    Other important co-morbidities: HLD and HTN GERD  No EKG/Echo/Stress Test results in cardiac procedure tab of chart.  Please see external EKG results in  that was scanned to chart with PCP clearance note.     Tests already available:  Results have been reviewed.             Instructions given. (See in Nurse's note) Pre op medication instructions sent via portal message and by phone [phone]; pt verbalized understanding.    Optimization: Pt needs PCP clearance prior to surgery.      Plan:   Pt saw PCP Dr. Rasmussen 2/3/25 for pre op clearance.    Navigation: Dr. Rasmussen cleared pt for surgery and form was scanned to media tab in chart.    Ht: 5'4"  Wt: 77.1 kg (170 lb)  BMI: 29.18  "

## 2025-03-18 ENCOUNTER — ANESTHESIA EVENT (OUTPATIENT)
Dept: SURGERY | Facility: HOSPITAL | Age: 70
End: 2025-03-18
Payer: MEDICARE

## 2025-03-18 ENCOUNTER — TELEPHONE (OUTPATIENT)
Dept: SPORTS MEDICINE | Facility: CLINIC | Age: 70
End: 2025-03-18
Payer: MEDICARE

## 2025-03-18 ENCOUNTER — OFFICE VISIT (OUTPATIENT)
Dept: SPORTS MEDICINE | Facility: CLINIC | Age: 70
End: 2025-03-18
Payer: MEDICARE

## 2025-03-18 VITALS
DIASTOLIC BLOOD PRESSURE: 63 MMHG | WEIGHT: 167.56 LBS | SYSTOLIC BLOOD PRESSURE: 99 MMHG | HEIGHT: 64 IN | BODY MASS INDEX: 28.6 KG/M2 | HEART RATE: 76 BPM

## 2025-03-18 DIAGNOSIS — M19.011 OSTEOARTHRITIS OF RIGHT GLENOHUMERAL JOINT: Primary | ICD-10-CM

## 2025-03-18 DIAGNOSIS — M25.511 CHRONIC RIGHT SHOULDER PAIN: ICD-10-CM

## 2025-03-18 DIAGNOSIS — G89.29 CHRONIC RIGHT SHOULDER PAIN: ICD-10-CM

## 2025-03-18 PROCEDURE — 3078F DIAST BP <80 MM HG: CPT | Mod: CPTII,S$GLB,, | Performed by: PHYSICIAN ASSISTANT

## 2025-03-18 PROCEDURE — 1125F AMNT PAIN NOTED PAIN PRSNT: CPT | Mod: CPTII,S$GLB,, | Performed by: PHYSICIAN ASSISTANT

## 2025-03-18 PROCEDURE — 4010F ACE/ARB THERAPY RXD/TAKEN: CPT | Mod: CPTII,S$GLB,, | Performed by: PHYSICIAN ASSISTANT

## 2025-03-18 PROCEDURE — 1159F MED LIST DOCD IN RCRD: CPT | Mod: CPTII,S$GLB,, | Performed by: PHYSICIAN ASSISTANT

## 2025-03-18 PROCEDURE — 3074F SYST BP LT 130 MM HG: CPT | Mod: CPTII,S$GLB,, | Performed by: PHYSICIAN ASSISTANT

## 2025-03-18 PROCEDURE — 99214 OFFICE O/P EST MOD 30 MIN: CPT | Mod: 57,S$GLB,, | Performed by: PHYSICIAN ASSISTANT

## 2025-03-18 PROCEDURE — 3008F BODY MASS INDEX DOCD: CPT | Mod: CPTII,S$GLB,, | Performed by: PHYSICIAN ASSISTANT

## 2025-03-18 PROCEDURE — 99999 PR PBB SHADOW E&M-EST. PATIENT-LVL IV: CPT | Mod: PBBFAC,,, | Performed by: PHYSICIAN ASSISTANT

## 2025-03-18 PROCEDURE — 3044F HG A1C LEVEL LT 7.0%: CPT | Mod: CPTII,S$GLB,, | Performed by: PHYSICIAN ASSISTANT

## 2025-03-18 RX ORDER — OXYCODONE HYDROCHLORIDE 5 MG/1
5 TABLET ORAL
Refills: 0 | Status: CANCELLED | OUTPATIENT
Start: 2025-03-18

## 2025-03-18 RX ORDER — PREGABALIN 75 MG/1
75 CAPSULE ORAL NIGHTLY
Status: CANCELLED | OUTPATIENT
Start: 2025-03-18

## 2025-03-18 RX ORDER — DEXTROSE MONOHYDRATE AND SODIUM CHLORIDE 5; .9 G/100ML; G/100ML
INJECTION, SOLUTION INTRAVENOUS CONTINUOUS
Status: CANCELLED | OUTPATIENT
Start: 2025-03-18

## 2025-03-18 RX ORDER — CELECOXIB 200 MG/1
400 CAPSULE ORAL
Status: CANCELLED | OUTPATIENT
Start: 2025-03-18

## 2025-03-18 RX ORDER — MUPIROCIN 20 MG/G
OINTMENT TOPICAL
Status: CANCELLED | OUTPATIENT
Start: 2025-03-18

## 2025-03-18 RX ORDER — ACETAMINOPHEN 500 MG
1000 TABLET ORAL EVERY 6 HOURS
Status: CANCELLED | OUTPATIENT
Start: 2025-03-18 | End: 2025-03-20

## 2025-03-18 RX ORDER — ASPIRIN 325 MG
325 TABLET ORAL DAILY
Qty: 21 TABLET | Refills: 0 | Status: SHIPPED | OUTPATIENT
Start: 2025-03-18 | End: 2025-04-09

## 2025-03-18 RX ORDER — TRAMADOL HYDROCHLORIDE 50 MG/1
50 TABLET ORAL EVERY 6 HOURS PRN
Qty: 20 TABLET | Refills: 0 | Status: SHIPPED | OUTPATIENT
Start: 2025-03-18

## 2025-03-18 RX ORDER — SODIUM CHLORIDE 9 MG/ML
INJECTION, SOLUTION INTRAVENOUS CONTINUOUS
Status: CANCELLED | OUTPATIENT
Start: 2025-03-18

## 2025-03-18 RX ORDER — ONDANSETRON 8 MG/1
8 TABLET, ORALLY DISINTEGRATING ORAL EVERY 8 HOURS PRN
Status: CANCELLED | OUTPATIENT
Start: 2025-03-18

## 2025-03-18 RX ORDER — ACETAMINOPHEN 500 MG
1000 TABLET ORAL
Status: CANCELLED | OUTPATIENT
Start: 2025-03-18

## 2025-03-18 RX ORDER — NAPROXEN SODIUM 220 MG/1
81 TABLET, FILM COATED ORAL 2 TIMES DAILY
Qty: 28 TABLET | Refills: 0 | Status: CANCELLED | OUTPATIENT
Start: 2025-03-18

## 2025-03-18 RX ORDER — PROMETHAZINE HYDROCHLORIDE 25 MG/1
25 TABLET ORAL EVERY 6 HOURS PRN
Qty: 20 TABLET | Refills: 0 | Status: SHIPPED | OUTPATIENT
Start: 2025-03-18

## 2025-03-18 RX ORDER — AMOXICILLIN 250 MG
1 CAPSULE ORAL 2 TIMES DAILY
Status: CANCELLED | OUTPATIENT
Start: 2025-03-18

## 2025-03-18 RX ORDER — FAMOTIDINE 20 MG/1
20 TABLET, FILM COATED ORAL 2 TIMES DAILY
Status: CANCELLED | OUTPATIENT
Start: 2025-03-18

## 2025-03-18 RX ORDER — MUPIROCIN 20 MG/G
1 OINTMENT TOPICAL 2 TIMES DAILY
Status: CANCELLED | OUTPATIENT
Start: 2025-03-18 | End: 2025-03-23

## 2025-03-18 RX ORDER — CELECOXIB 200 MG/1
200 CAPSULE ORAL DAILY
Status: CANCELLED | OUTPATIENT
Start: 2025-03-19

## 2025-03-18 RX ORDER — PROCHLORPERAZINE EDISYLATE 5 MG/ML
5 INJECTION INTRAMUSCULAR; INTRAVENOUS EVERY 6 HOURS PRN
Status: CANCELLED | OUTPATIENT
Start: 2025-03-18

## 2025-03-18 RX ORDER — POLYETHYLENE GLYCOL 3350 17 G/17G
17 POWDER, FOR SOLUTION ORAL DAILY
Status: CANCELLED | OUTPATIENT
Start: 2025-03-18

## 2025-03-18 RX ORDER — OXYCODONE AND ACETAMINOPHEN 10; 325 MG/1; MG/1
1 TABLET ORAL EVERY 6 HOURS PRN
Qty: 28 TABLET | Refills: 0 | Status: SHIPPED | OUTPATIENT
Start: 2025-03-18

## 2025-03-18 NOTE — PROGRESS NOTES
Rebecca Johnston Zollinger is a 69 y.o. year old here today for preoperative visit in preparation for a Right reverse total shoulder arthroplasty to be performed by Dr. Emanuel Aguilar on 3/19/2025.  she was last seen and treated in the clinic on 1/24/2025. she has been cleared to proceed with surgery. There has been no significant change in medical status since last visit. No fever, chills, malaise, or unexplained weight change.      Allergies, Medications, past medical and surgical history reviewed.    Focused examination performed.    Patient declined to see surgeon today. All questions answered. Patient encouraged to call with questions. Contact information given.     Pre, lawson, and post operative procedures and expectations discussed. Goals of successful surgery reviewed and include manageable pain levels, surgical site free of infection, medication management, and ambulation with PT/OT assistance. Healthy weight management discussed with patient and caregiver who were receptive to eduction of healthy diet and activity. No other necessary lifestyle changes identified. Educated patient about signs and symptoms of infection, medication management, anticoagulation therapy, risk of tobacco and alcohol use, and self-care to promote healing. Surgical guide given for future reference. Hibiclens given to patient with instructions. All questions were answered.     Rebecca Johnston Zollinger verbalized an understanding to the education and goals. Patient has displayed readiness to engage in care and is ready to proceed with surgery.  Patient reports her  is able and ready to provide assistance at home after discharge.    Surgical and blood consents signed.    Rebecca Johnston Zollinger will contact us if there are any questions, concerns, or changes in medical status prior to surgery.       Joint class: N/A    Patient has discussed discharge planning with surgeon. Patient will be discharged to home following  surgery.   patient will be scheduled with [facility].     50 minutes of time was spent on patient education, review of records, templating, H&P, , appointment scheduling and optimizing patient for surgery.

## 2025-03-18 NOTE — H&P (VIEW-ONLY)
Rebecca Johnston Zollinger  is here for a completion of her perioperative paperwork. she  Is scheduled to undergo:    RIGHT reverse total shoulder arthroplasty     on 3/19/2025.      She does need clearance for this procedure.    Patient has been cleared to proceed with surgery.      PAST MEDICAL HISTORY:   Past Medical History:   Diagnosis Date    Anticoagulant long-term use     Hypertension      PAST SURGICAL HISTORY:   Past Surgical History:   Procedure Laterality Date    EPIDURAL STEROID INJECTION N/A 10/10/2024    Procedure: CERVICAL C7/T1 IL SANJUANA *ASPIRIN OTC* HOLD FOR 5 DAYS  **JENNIFERSA PT**;  Surgeon: Devon Gamble MD;  Location: [facility] PAIN MGT;  Service: Pain Management;  Laterality: N/A;  [phone]  2 WK F/U DEEPALI    TRANSFORAMINAL EPIDURAL INJECTION OF STEROID Right 8/14/2023    Procedure: INJECTION, STEROID, EPIDURAL, TRANSFORAMINAL APPROACH, RIGHT T12/L1 SOONER DATE;  Surgeon: Isi Womack MD;  Location: [facility] PAIN MGT;  Service: Pain Management;  Laterality: Right;    TRANSFORAMINAL EPIDURAL INJECTION OF STEROID Right 12/18/2023    Procedure: LUMBAR TRANSFORAMINAL RIGHT T12/L1 AND L1/2;  Surgeon: Isi Womack MD;  Location: [facility] PAIN MGT;  Service: Pain Management;  Laterality: Right;  [phone]  2 WK F/U DEEPALI    TRANSFORAMINAL EPIDURAL INJECTION OF STEROID Right 8/29/2024    Procedure: THORACIC TRANSFORAMINAL RIGHT T12/L1 AND L1/2 *ASPIRIN OTC* HOLD FOR 5 DAYS;  Surgeon: Isi Womack MD;  Location: [facility] PAIN MGT;  Service: Pain Management;  Laterality: Right;  [phone]  2 WK F/U DEEPALI    TRANSFORAMINAL EPIDURAL INJECTION OF STEROID Right 11/4/2024    Procedure: THORACIC TRANSFORAMINAL RIGHT T7/8 *ASPIRIN OTC* HOLD FOR 5 DAYS;  Surgeon: Isi Womack MD;  Location: [facility] PAIN MGT;  Service: Pain Management;  Laterality: Right;  [phone]  2 WK F/U DEEPALI    TRANSFORAMINAL EPIDURAL INJECTION OF STEROID Left 2/17/2025    Procedure: THORACIC TRANSFORAMINAL LEFT T7/8 *ASPIRIN OTC*;  Surgeon: Isi Womack  MD;  Location: [facility]IN [facility]GT;  Service: Pain Management;  Laterality: Left;  2 WK F/U AMANDA     FAMILY HISTORY: No family history on file.  SOCIAL HISTORY: Social History[1]    MEDICATIONS: Current Medications[2]  ALLERGIES:   Review of patient's allergies indicates:   Allergen Reactions    I.n.h. Hives    Protease-amylase equip  Hives     Tide detergent       VITAL SIGNS: There were no vitals taken for this visit.     Risks, indications and benefits of the surgical procedure were discussed with the patient. All questions with regard to surgery, rehab, expected return to functional activities, activities of daily living and recreational endeavors were answered to her satisfaction.    It was explained to the patient that there may be an increase in surgical risks if the patient has certain co-morbidities such as but not limited to: Obesity, Cardiovascular issues (CHF, CAD, Arrhythmias), chronic pulmonary issues, previous or current neurovascular/neurological issues, previous strokes, diabetes mellitus, previous wound healing issues, previous wound or skin infections, PVD, clotting disorders, if the patient uses chronic steroids, if the patient takes or has immune compromising medications or diseases, or has previously or currently used tobacco products.     The patient verbalized that he/she does not have any additional clotting, bleeding, or blood disorders, other than what is list in her chart on today's review.     Then a brief history and physical exam were performed.    Review of Systems   Constitution: Negative. Negative for chills, fever and night sweats.   HENT: Negative for congestion and headaches.    Eyes: Negative for blurred vision, left vision loss and right vision loss.   Cardiovascular: Negative for chest pain and syncope.   Respiratory: Negative for cough and shortness of breath.    Endocrine: Negative for polydipsia, polyphagia and polyuria.   Hematologic/Lymphatic: Negative for bleeding  problem. Does not bruise/bleed easily.   Skin: Negative for dry skin, itching and rash.   Musculoskeletal: Negative for falls and muscle weakness.   Gastrointestinal: Negative for abdominal pain and bowel incontinence.   Genitourinary: Negative for bladder incontinence and nocturia.   Neurological: Negative for disturbances in coordination, loss of balance and seizures.   Psychiatric/Behavioral: Negative for depression. The patient does not have insomnia.    Allergic/Immunologic: Negative for hives and persistent infections.     PHYSICAL EXAM:  GEN: A&Ox3, WD WN NAD  HEENT: WNL  CHEST: CTAB, no W/R/R  HEART: RRR, no M/R/G  ABD: Soft, NT ND, BS x4 QUADS  MS; See Epic  NEURO: CN II-XII intact       The surgical consent was then reviewed with the patient, who agreed with all the contents of the consent form and it was signed. she was then given the [facility] surgery packet to bring with her to surgery for the anesthesia portion of her perioperative paperwork.   For all physicians except for Dr. Aguilar, we will email and possibly fax the consent forms and booking sheets to [facility] pre-admit.    The patient was given the opportunity to ask questions about the surgical plan and consent form, and once no other questions were asked, I proceeded with the pre-op appointment.    PHYSICAL THERAPY:  She was also instructed regarding physical therapy and will begin approximately 2 weeks after surgery at the [facility].     POST OP CARE:instructions were reviewed including care of the wound and dressing after surgery and when she can shower.     CRUTCHES OR WALKER: It was explained to the patient that if they are having a lower extremity surgery that they will require either a walker or crutches to ambulate safely with after surgery. It was explained that a cane or other assistive devices are not sufficient to safely ambulate with after surgery. I explained to the patient that I will place an  order for them to receive either crutches or a walker after surgery to go home with. It was explained that if they have crutches or a walker at home already, that they are REQUIRED to bring them to the hospital on the day of surgery. It was explained that if they do not have them at the hospital on the day of surgery that they WILL be provided a new pair or crutches or a walker to go home with to ensure ambulation will be safe if the patient needs to stop somewhere on the way home.      PAIN MANAGEMENT: Rebecca Johnston Zollinger was also given their pain management regimen, listed below.     PAIN MEDICATION:  Percocet 10/325mg 1 po q 4-6 hours prn pain  Ultram 50 mg Take 1-2 p.o. q.6 hours p.r.n. breakthrough pain,   Phenergan 25 mg one p.o. q.6 hours p.r.n. nausea and vomiting.    Post op meds to be delivered bedside prior to discharge. Deliver to family if patient is in surgery at 5pm.    The patient was told that narcotic pain medications may make them drowsy and instructions were given to not sign legal documents, drive or operate heavy machinery, cars, or equipment while under the influence of narcotic medications. The patient was told and understands that narcotic pain medications should only be used as needed to control pain and that other options of pain control include TENs unit and ice packs/unit.     Patient was instructed to purchase and take Colace to counter possible GI side effects of taking opiates.     DVT prophylaxis was discussed with the patient today including risk factors for developing DVTs and history of DVTs. The patient was asked if any specific recommendations were given from the doctor/s that did pre-operative surgical clearance. The patient was then given an education sheet about DVTs and PE with warning signs and symptoms of both and steps to take if they suspect either of these.    Patient was asked if they were taking or using OCP pills or devices. If they answered yes, then they  were instructed to stop using OCPs at this pre-operative appointment until 2 months post-op to help prevent DVT development. They understand that there are other forms of birth control that do not involve hormones. They expressed understanding that ignoring/not following this instruction could result in a DVT which could turn into a deadly pulmonary embolism.     This along with the Modified Caprini risk assessment model for VTE in general surgical patients was used to determine the patient's DVT risk.     From: Melly MK, Arnulfo DA, Elizabeth SM, et al. Prevention of VTE in nonorthopedic surgical patients: antithrombotic therapy and prevention of thrombosis, 9th ed: American College of Chest Physicians evidence-based clinical practical guidelines. Chest 2012; 141:e227S. Copyright © 2012. Reproduced with permission from the American College of Chest Physicians.    The below listed DVT prophylaxis regimen was discussed along with SCDs during surgery and bilateral LAUREN compression stockings to be used post-op. Length of treatment has been determined to be 10-42 days post-op by the above noted Caprini assessment model. Early ambulation post-op was also discussed and emphasized with the patient.     The patient was instructed to buy and take:  Aspirin 325mg QD x 3 weeks for DVT prophylaxis starting on the morning after surgery.  Patient will also use bilateral TEDs on lower extremities, SCDs during surgery, and early ambulation post-op. If the patient was previously taking 81mg baby aspirin, they were told to not take it starting 5 days prior to surgery and to restart the 81mg aspirin after surgery.       Patient was also told to buy over the counter Prilosec medication if needed and take it once daily for GI protection as long as they are taking NSAIDs or Aspirin.      Patient denies history of seizures.     I explained to following and the patient expressed understanding:  The patient is currently aware of the COVID19  pandemic and that proceeding with their surgical procedure could potentially increase exposure to coronavirus in the community. The patient understands that there is the possibility of delayed or cancelled appts or PT visits in the future. They understand that infection with the coronavirus could complicate their surgery recovery. They are aware of the current policies and procedures of [facility] and the government regarding the pandemic and they were given the option of delaying my surgery. The patient elects to proceed with surgery at this time.     The patient was instructed to practice strict social distancing, hand washing/hygiene, respiratory hygiene, and cough etiquette from now until 6 weeks following surgery to reduce the risk of dominick coronavirus.    As there were no other questions to be asked, she was given my business card along with Emanuel Aguilar MD business card if she has any questions or concerns prior to surgery or in the postop period.          [1]   Social History  Socioeconomic History    Marital status:    Tobacco Use    Smoking status: Former     Types: Cigarettes     Passive exposure: Past    Smokeless tobacco: Never    Tobacco comments:     Quit 20 years ago   Substance and Sexual Activity    Alcohol use: Not Currently    Drug use: Never    Sexual activity: Not Currently     Social Drivers of Health     Financial Resource Strain: Low Risk  (3/17/2025)    Overall Financial Resource Strain (CARDIA)     Difficulty of Paying Living Expenses: Not hard at all   Food Insecurity: No Food Insecurity (3/17/2025)    Hunger Vital Sign     Worried About Running Out of Food in the Last Year: Never true     Ran Out of Food in the Last Year: Never true   Transportation Needs: No Transportation Needs (3/17/2025)    PRAPARE - Transportation     Lack of Transportation (Medical): No     Lack of Transportation (Non-Medical): No   Physical Activity: Sufficiently Active (3/17/2025)    Exercise Vital  Sign     Days of Exercise per Week: 5 days     Minutes of Exercise per Session: 50 min   Stress: Stress Concern Present (3/17/2025)    [place] [employer] - Occupational Stress Questionnaire     Feeling of Stress : Rather much   Housing Stability: Low Risk  (3/17/2025)    Housing Stability Vital Sign     Unable to Pay for Housing in the Last Year: No     Number of Times Moved in the Last Year: 1     Homeless in the Last Year: No   [2]   Current Outpatient Medications:     alendronate (FOSAMAX) 70 MG tablet, Take 70 mg by mouth every 7 days., Disp: , Rfl:     aspirin (ECOTRIN) 81 MG EC tablet, Take 81 mg by mouth once daily., Disp: , Rfl:     buPROPion (WELLBUTRIN XL) 150 MG TB24 tablet, Take 1 tablet by mouth every morning., Disp: , Rfl:     celecoxib (CELEBREX) 200 MG capsule, Take 1 capsule (200 mg total) by mouth once daily., Disp: 30 capsule, Rfl: 1    cycloSPORINE (RESTASIS) 0.05 % ophthalmic emulsion, Place 1 drop into both eyes 3 (three) times daily., Disp: , Rfl:     estradioL (ESTRACE) 0.01 % (0.1 mg/gram) vaginal cream, Place vaginally once daily., Disp: , Rfl:     FLUoxetine 20 MG capsule, Take 1 capsule by mouth once daily., Disp: , Rfl:     hydrocodone-acetaminophen (HYCET) solution 7.5-325 mg/15mL, Take by mouth 4 (four) times daily as needed for Pain., Disp: , Rfl:     ipratropium (ATROVENT) 42 mcg (0.06 %) nasal spray, 2 sprays by Each Nostril route 4 (four) times daily., Disp: , Rfl:     methocarbamoL (ROBAXIN) 500 MG Tab, TAKE 1 TABLET BY MOUTH THREE TIMES A DAY AS NEEDED MUSCLE PAIN, Disp: 30 tablet, Rfl: 0    nitrofurantoin, macrocrystal-monohydrate, (MACROBID) 100 MG capsule, Take 100 mg by mouth 2 (two) times daily., Disp: , Rfl:     pantoprazole (PROTONIX) 40 MG tablet, Take 1 tablet by mouth once daily., Disp: , Rfl:     phenazopyridine (PYRIDIUM) 200 MG tablet, Take 200 mg by mouth as needed for Pain., Disp: , Rfl:     prazosin (MINIPRESS) 1 MG Cap, Take 2 capsules by  mouth every evening., Disp: , Rfl:     rosuvastatin (CRESTOR) 5 MG tablet, Take 1 tablet by mouth every evening., Disp: , Rfl:     tiZANidine (ZANAFLEX) 2 MG tablet, 1 at bedtime for 3 nights then 2 every night for 3 nights then 3 every night to follow. Stay at the most comfortable dose., Disp: 90 tablet, Rfl: 2

## 2025-03-18 NOTE — H&P
Rebecca Johnston Zollinger  is here for a completion of her perioperative paperwork. she  Is scheduled to undergo:    RIGHT reverse total shoulder arthroplasty     on 3/19/2025.      She does need clearance for this procedure.    Patient has been cleared to proceed with surgery.      PAST MEDICAL HISTORY:   Past Medical History:   Diagnosis Date    Anticoagulant long-term use     Hypertension      PAST SURGICAL HISTORY:   Past Surgical History:   Procedure Laterality Date    EPIDURAL STEROID INJECTION N/A 10/10/2024    Procedure: CERVICAL C7/T1 IL SANJUANA *ASPIRIN OTC* HOLD FOR 5 DAYS  **JENNIFERSA PT**;  Surgeon: Devon Gamble MD;  Location: [facility]IN [facility];  Service: Pain Management;  Laterality: N/A;  [phone]  2 WK F/U DEEPALI    TRANSFORAMINAL EPIDURAL INJECTION OF STEROID Right 8/14/2023    Procedure: INJECTION, STEROID, EPIDURAL, TRANSFORAMINAL APPROACH, RIGHT T12/L1 SOONER DATE;  Surgeon: Isi Womack MD;  Location: [facility];  Service: Pain Management;  Laterality: Right;    TRANSFORAMINAL EPIDURAL INJECTION OF STEROID Right 12/18/2023    Procedure: LUMBAR TRANSFORAMINAL RIGHT T12/L1 AND L1/2;  Surgeon: Isi Womack MD;  Location: [facility];  Service: Pain Management;  Laterality: Right;  [phone]  2 WK F/U DEEPALI    TRANSFORAMINAL EPIDURAL INJECTION OF STEROID Right 8/29/2024    Procedure: THORACIC TRANSFORAMINAL RIGHT T12/L1 AND L1/2 *ASPIRIN OTC* HOLD FOR 5 DAYS;  Surgeon: Isi Womack MD;  Location: [facility];  Service: Pain Management;  Laterality: Right;  [phone]  2 WK F/U DEEPALI    TRANSFORAMINAL EPIDURAL INJECTION OF STEROID Right 11/4/2024    Procedure: THORACIC TRANSFORAMINAL RIGHT T7/8 *ASPIRIN OTC* HOLD FOR 5 DAYS;  Surgeon: Isi Womack MD;  Location: [facility];  Service: Pain Management;  Laterality: Right;  [phone]  2 WK F/U DEEPALI    TRANSFORAMINAL EPIDURAL INJECTION OF STEROID Left 2/17/2025    Procedure: THORACIC TRANSFORAMINAL LEFT T7/8 *ASPIRIN OTC*;  Surgeon: Isi Womack  MD;  Location: [facility];  Service: Pain Management;  Laterality: Left;  2 WK F/U AMANAD     FAMILY HISTORY: No family history on file.  SOCIAL HISTORY: Social History[1]    MEDICATIONS: Current Medications[2]  ALLERGIES:   Review of patient's allergies indicates:   Allergen Reactions    I.n.h. Hives    Protease-amylase equip  Hives     Tide detergent       VITAL SIGNS: There were no vitals taken for this visit.     Risks, indications and benefits of the surgical procedure were discussed with the patient. All questions with regard to surgery, rehab, expected return to functional activities, activities of daily living and recreational endeavors were answered to her satisfaction.    It was explained to the patient that there may be an increase in surgical risks if the patient has certain co-morbidities such as but not limited to: Obesity, Cardiovascular issues (CHF, CAD, Arrhythmias), chronic pulmonary issues, previous or current neurovascular/neurological issues, previous strokes, diabetes mellitus, previous wound healing issues, previous wound or skin infections, PVD, clotting disorders, if the patient uses chronic steroids, if the patient takes or has immune compromising medications or diseases, or has previously or currently used tobacco products.     The patient verbalized that he/she does not have any additional clotting, bleeding, or blood disorders, other than what is list in her chart on today's review.     Then a brief history and physical exam were performed.    Review of Systems   Constitution: Negative. Negative for chills, fever and night sweats.   HENT: Negative for congestion and headaches.    Eyes: Negative for blurred vision, left vision loss and right vision loss.   Cardiovascular: Negative for chest pain and syncope.   Respiratory: Negative for cough and shortness of breath.    Endocrine: Negative for polydipsia, polyphagia and polyuria.   Hematologic/Lymphatic: Negative for bleeding  problem. Does not bruise/bleed easily.   Skin: Negative for dry skin, itching and rash.   Musculoskeletal: Negative for falls and muscle weakness.   Gastrointestinal: Negative for abdominal pain and bowel incontinence.   Genitourinary: Negative for bladder incontinence and nocturia.   Neurological: Negative for disturbances in coordination, loss of balance and seizures.   Psychiatric/Behavioral: Negative for depression. The patient does not have insomnia.    Allergic/Immunologic: Negative for hives and persistent infections.     PHYSICAL EXAM:  GEN: A&Ox3, WD WN NAD  HEENT: WNL  CHEST: CTAB, no W/R/R  HEART: RRR, no M/R/G  ABD: Soft, NT ND, BS x4 QUADS  MS; See Epic  NEURO: CN II-XII intact       The surgical consent was then reviewed with the patient, who agreed with all the contents of the consent form and it was signed. she was then given the [facility] surgery packet to bring with her to surgery for the anesthesia portion of her perioperative paperwork.   For all physicians except for Dr. Aguilar, we will email and possibly fax the consent forms and booking sheets to [facility] pre-admit.    The patient was given the opportunity to ask questions about the surgical plan and consent form, and once no other questions were asked, I proceeded with the pre-op appointment.    PHYSICAL THERAPY:  She was also instructed regarding physical therapy and will begin approximately 2 weeks after surgery at the [facility].     POST OP CARE:instructions were reviewed including care of the wound and dressing after surgery and when she can shower.     CRUTCHES OR WALKER: It was explained to the patient that if they are having a lower extremity surgery that they will require either a walker or crutches to ambulate safely with after surgery. It was explained that a cane or other assistive devices are not sufficient to safely ambulate with after surgery. I explained to the patient that I will place an  order for them to receive either crutches or a walker after surgery to go home with. It was explained that if they have crutches or a walker at home already, that they are REQUIRED to bring them to the hospital on the day of surgery. It was explained that if they do not have them at the hospital on the day of surgery that they WILL be provided a new pair or crutches or a walker to go home with to ensure ambulation will be safe if the patient needs to stop somewhere on the way home.      PAIN MANAGEMENT: Rebecca Johnston Zollinger was also given their pain management regimen, listed below.     PAIN MEDICATION:  Percocet 10/325mg 1 po q 4-6 hours prn pain  Ultram 50 mg Take 1-2 p.o. q.6 hours p.r.n. breakthrough pain,   Phenergan 25 mg one p.o. q.6 hours p.r.n. nausea and vomiting.    Post op meds to be delivered bedside prior to discharge. Deliver to family if patient is in surgery at 5pm.    The patient was told that narcotic pain medications may make them drowsy and instructions were given to not sign legal documents, drive or operate heavy machinery, cars, or equipment while under the influence of narcotic medications. The patient was told and understands that narcotic pain medications should only be used as needed to control pain and that other options of pain control include TENs unit and ice packs/unit.     Patient was instructed to purchase and take Colace to counter possible GI side effects of taking opiates.     DVT prophylaxis was discussed with the patient today including risk factors for developing DVTs and history of DVTs. The patient was asked if any specific recommendations were given from the doctor/s that did pre-operative surgical clearance. The patient was then given an education sheet about DVTs and PE with warning signs and symptoms of both and steps to take if they suspect either of these.    Patient was asked if they were taking or using OCP pills or devices. If they answered yes, then they  were instructed to stop using OCPs at this pre-operative appointment until 2 months post-op to help prevent DVT development. They understand that there are other forms of birth control that do not involve hormones. They expressed understanding that ignoring/not following this instruction could result in a DVT which could turn into a deadly pulmonary embolism.     This along with the Modified Caprini risk assessment model for VTE in general surgical patients was used to determine the patient's DVT risk.     From: Melly MK, Arnulfo DA, Elizabeth SM, et al. Prevention of VTE in nonorthopedic surgical patients: antithrombotic therapy and prevention of thrombosis, 9th ed: American College of Chest Physicians evidence-based clinical practical guidelines. Chest 2012; 141:e227S. Copyright © 2012. Reproduced with permission from the American College of Chest Physicians.    The below listed DVT prophylaxis regimen was discussed along with SCDs during surgery and bilateral LAUREN compression stockings to be used post-op. Length of treatment has been determined to be 10-42 days post-op by the above noted Caprini assessment model. Early ambulation post-op was also discussed and emphasized with the patient.     The patient was instructed to buy and take:  Aspirin 325mg QD x 3 weeks for DVT prophylaxis starting on the morning after surgery.  Patient will also use bilateral TEDs on lower extremities, SCDs during surgery, and early ambulation post-op. If the patient was previously taking 81mg baby aspirin, they were told to not take it starting 5 days prior to surgery and to restart the 81mg aspirin after surgery.       Patient was also told to buy over the counter Prilosec medication if needed and take it once daily for GI protection as long as they are taking NSAIDs or Aspirin.      Patient denies history of seizures.     I explained to following and the patient expressed understanding:  The patient is currently aware of the COVID19  pandemic and that proceeding with their surgical procedure could potentially increase exposure to coronavirus in the community. The patient understands that there is the possibility of delayed or cancelled appts or PT visits in the future. They understand that infection with the coronavirus could complicate their surgery recovery. They are aware of the current policies and procedures of [facility] and the government regarding the pandemic and they were given the option of delaying my surgery. The patient elects to proceed with surgery at this time.     The patient was instructed to practice strict social distancing, hand washing/hygiene, respiratory hygiene, and cough etiquette from now until 6 weeks following surgery to reduce the risk of dominick coronavirus.    As there were no other questions to be asked, she was given my business card along with Emanuel Aguilar MD business card if she has any questions or concerns prior to surgery or in the postop period.          [1]   Social History  Socioeconomic History    Marital status:    Tobacco Use    Smoking status: Former     Types: Cigarettes     Passive exposure: Past    Smokeless tobacco: Never    Tobacco comments:     Quit 20 years ago   Substance and Sexual Activity    Alcohol use: Not Currently    Drug use: Never    Sexual activity: Not Currently     Social Drivers of Health     Financial Resource Strain: Low Risk  (3/17/2025)    Overall Financial Resource Strain (CARDIA)     Difficulty of Paying Living Expenses: Not hard at all   Food Insecurity: No Food Insecurity (3/17/2025)    Hunger Vital Sign     Worried About Running Out of Food in the Last Year: Never true     Ran Out of Food in the Last Year: Never true   Transportation Needs: No Transportation Needs (3/17/2025)    PRAPARE - Transportation     Lack of Transportation (Medical): No     Lack of Transportation (Non-Medical): No   Physical Activity: Sufficiently Active (3/17/2025)    Exercise Vital  Sign     Days of Exercise per Week: 5 days     Minutes of Exercise per Session: 50 min   Stress: Stress Concern Present (3/17/2025)    [employer] - Occupational Stress Questionnaire     Feeling of Stress : Rather much   Housing Stability: Low Risk  (3/17/2025)    Housing Stability Vital Sign     Unable to Pay for Housing in the Last Year: No     Number of Times Moved in the Last Year: 1     Homeless in the Last Year: No   [2]   Current Outpatient Medications:     alendronate (FOSAMAX) 70 MG tablet, Take 70 mg by mouth every 7 days., Disp: , Rfl:     aspirin (ECOTRIN) 81 MG EC tablet, Take 81 mg by mouth once daily., Disp: , Rfl:     buPROPion (WELLBUTRIN XL) 150 MG TB24 tablet, Take 1 tablet by mouth every morning., Disp: , Rfl:     celecoxib (CELEBREX) 200 MG capsule, Take 1 capsule (200 mg total) by mouth once daily., Disp: 30 capsule, Rfl: 1    cycloSPORINE (RESTASIS) 0.05 % ophthalmic emulsion, Place 1 drop into both eyes 3 (three) times daily., Disp: , Rfl:     estradioL (ESTRACE) 0.01 % (0.1 mg/gram) vaginal cream, Place vaginally once daily., Disp: , Rfl:     FLUoxetine 20 MG capsule, Take 1 capsule by mouth once daily., Disp: , Rfl:     hydrocodone-acetaminophen (HYCET) solution 7.5-325 mg/15mL, Take by mouth 4 (four) times daily as needed for Pain., Disp: , Rfl:     ipratropium (ATROVENT) 42 mcg (0.06 %) nasal spray, 2 sprays by Each Nostril route 4 (four) times daily., Disp: , Rfl:     methocarbamoL (ROBAXIN) 500 MG Tab, TAKE 1 TABLET BY MOUTH THREE TIMES A DAY AS NEEDED MUSCLE PAIN, Disp: 30 tablet, Rfl: 0    nitrofurantoin, macrocrystal-monohydrate, (MACROBID) 100 MG capsule, Take 100 mg by mouth 2 (two) times daily., Disp: , Rfl:     pantoprazole (PROTONIX) 40 MG tablet, Take 1 tablet by mouth once daily., Disp: , Rfl:     phenazopyridine (PYRIDIUM) 200 MG tablet, Take 200 mg by mouth as needed for Pain., Disp: , Rfl:     prazosin (MINIPRESS) 1 MG Cap, Take 2 capsules by  mouth every evening., Disp: , Rfl:     rosuvastatin (CRESTOR) 5 MG tablet, Take 1 tablet by mouth every evening., Disp: , Rfl:     tiZANidine (ZANAFLEX) 2 MG tablet, 1 at bedtime for 3 nights then 2 every night for 3 nights then 3 every night to follow. Stay at the most comfortable dose., Disp: 90 tablet, Rfl: 2

## 2025-03-19 ENCOUNTER — ANESTHESIA (OUTPATIENT)
Dept: SURGERY | Facility: HOSPITAL | Age: 70
End: 2025-03-19
Payer: MEDICARE

## 2025-03-19 ENCOUNTER — HOSPITAL ENCOUNTER (OUTPATIENT)
Facility: HOSPITAL | Age: 70
Discharge: HOME | End: 2025-03-19
Attending: ORTHOPAEDIC SURGERY | Admitting: ORTHOPAEDIC SURGERY
Payer: MEDICARE

## 2025-03-19 VITALS
DIASTOLIC BLOOD PRESSURE: 55 MMHG | RESPIRATION RATE: 18 BRPM | OXYGEN SATURATION: 97 % | BODY MASS INDEX: 28.51 KG/M2 | HEIGHT: 64 IN | WEIGHT: 167 LBS | HEART RATE: 66 BPM | SYSTOLIC BLOOD PRESSURE: 102 MMHG | TEMPERATURE: 98 F

## 2025-03-19 DIAGNOSIS — M19.011 OSTEOARTHRITIS OF RIGHT GLENOHUMERAL JOINT: ICD-10-CM

## 2025-03-19 PROCEDURE — 76942 ECHO GUIDE FOR BIOPSY: CPT | Performed by: STUDENT IN AN ORGANIZED HEALTH CARE EDUCATION/TRAINING PROGRAM

## 2025-03-19 PROCEDURE — C1776 JOINT DEVICE (IMPLANTABLE): HCPCS | Performed by: ORTHOPAEDIC SURGERY

## 2025-03-19 PROCEDURE — 25000003 PHARM REV CODE 250: Performed by: PHYSICIAN ASSISTANT

## 2025-03-19 PROCEDURE — 63600175 PHARM REV CODE 636 W HCPCS: Performed by: PHYSICIAN ASSISTANT

## 2025-03-19 PROCEDURE — C1769 GUIDE WIRE: HCPCS | Performed by: ORTHOPAEDIC SURGERY

## 2025-03-19 PROCEDURE — 36000711: Performed by: ORTHOPAEDIC SURGERY

## 2025-03-19 PROCEDURE — 27000221 HC OXYGEN, UP TO 24 HOURS

## 2025-03-19 PROCEDURE — 63600175 PHARM REV CODE 636 W HCPCS: Performed by: NURSE ANESTHETIST, CERTIFIED REGISTERED

## 2025-03-19 PROCEDURE — 27201423 OPTIME MED/SURG SUP & DEVICES STERILE SUPPLY: Performed by: ORTHOPAEDIC SURGERY

## 2025-03-19 PROCEDURE — 63600175 PHARM REV CODE 636 W HCPCS: Performed by: STUDENT IN AN ORGANIZED HEALTH CARE EDUCATION/TRAINING PROGRAM

## 2025-03-19 PROCEDURE — 25000003 PHARM REV CODE 250: Performed by: NURSE ANESTHETIST, CERTIFIED REGISTERED

## 2025-03-19 PROCEDURE — 71000033 HC RECOVERY, INTIAL HOUR: Performed by: ORTHOPAEDIC SURGERY

## 2025-03-19 PROCEDURE — 94761 N-INVAS EAR/PLS OXIMETRY MLT: CPT

## 2025-03-19 PROCEDURE — 71000015 HC POSTOP RECOV 1ST HR: Performed by: ORTHOPAEDIC SURGERY

## 2025-03-19 PROCEDURE — 63600175 PHARM REV CODE 636 W HCPCS: Performed by: ORTHOPAEDIC SURGERY

## 2025-03-19 PROCEDURE — 23472 RECONSTRUCT SHOULDER JOINT: CPT | Mod: 22,RT,, | Performed by: ORTHOPAEDIC SURGERY

## 2025-03-19 PROCEDURE — 37000008 HC ANESTHESIA 1ST 15 MINUTES: Performed by: ORTHOPAEDIC SURGERY

## 2025-03-19 PROCEDURE — C1713 ANCHOR/SCREW BN/BN,TIS/BN: HCPCS | Performed by: ORTHOPAEDIC SURGERY

## 2025-03-19 PROCEDURE — 37000009 HC ANESTHESIA EA ADD 15 MINS: Performed by: ORTHOPAEDIC SURGERY

## 2025-03-19 PROCEDURE — 36000710: Performed by: ORTHOPAEDIC SURGERY

## 2025-03-19 PROCEDURE — 27800903 OPTIME MED/SURG SUP & DEVICES OTHER IMPLANTS: Performed by: ORTHOPAEDIC SURGERY

## 2025-03-19 PROCEDURE — 71000039 HC RECOVERY, EACH ADD'L HOUR: Performed by: ORTHOPAEDIC SURGERY

## 2025-03-19 DEVICE — BASEPLATE GLENOID REV 30X6.5MM: Type: IMPLANTABLE DEVICE | Site: SHOULDER | Status: FUNCTIONAL

## 2025-03-19 DEVICE — IMPLANTABLE DEVICE
Type: IMPLANTABLE DEVICE | Site: SHOULDER | Status: FUNCTIONAL
Brand: TORNIER PERFORM® REVERSED GLENOID

## 2025-03-19 DEVICE — IMPLANTABLE DEVICE
Type: IMPLANTABLE DEVICE | Site: SHOULDER | Status: FUNCTIONAL
Brand: TORNIER PERFORM® REVERSED AUGMENTED GLENOID

## 2025-03-19 DEVICE — IMPLANTABLE DEVICE
Type: IMPLANTABLE DEVICE | Site: SHOULDER | Status: FUNCTIONAL
Brand: TORNIER FLEX SHOULDER SYSTEM

## 2025-03-19 RX ORDER — FAMOTIDINE 20 MG/1
20 TABLET, FILM COATED ORAL 2 TIMES DAILY
Status: DISCONTINUED | OUTPATIENT
Start: 2025-03-19 | End: 2025-03-19 | Stop reason: HOSPADM

## 2025-03-19 RX ORDER — OXYCODONE HYDROCHLORIDE 5 MG/1
5 TABLET ORAL
Refills: 0 | Status: DISCONTINUED | OUTPATIENT
Start: 2025-03-19 | End: 2025-03-19 | Stop reason: HOSPADM

## 2025-03-19 RX ORDER — POLYETHYLENE GLYCOL 3350 17 G/17G
17 POWDER, FOR SOLUTION ORAL DAILY
Status: DISCONTINUED | OUTPATIENT
Start: 2025-03-19 | End: 2025-03-19 | Stop reason: HOSPADM

## 2025-03-19 RX ORDER — VANCOMYCIN HYDROCHLORIDE 1 G/20ML
INJECTION, POWDER, LYOPHILIZED, FOR SOLUTION INTRAVENOUS
Status: DISCONTINUED | OUTPATIENT
Start: 2025-03-19 | End: 2025-03-19 | Stop reason: HOSPADM

## 2025-03-19 RX ORDER — FAMOTIDINE 10 MG/ML
INJECTION, SOLUTION INTRAVENOUS
Status: DISCONTINUED | OUTPATIENT
Start: 2025-03-19 | End: 2025-03-19

## 2025-03-19 RX ORDER — EPINEPHRINE 1 MG/ML
INJECTION, SOLUTION, CONCENTRATE INTRAVENOUS
Status: DISCONTINUED
Start: 2025-03-19 | End: 2025-03-19 | Stop reason: HOSPADM

## 2025-03-19 RX ORDER — PHENYLEPHRINE HYDROCHLORIDE 10 MG/ML
INJECTION INTRAVENOUS
Status: DISCONTINUED | OUTPATIENT
Start: 2025-03-19 | End: 2025-03-19

## 2025-03-19 RX ORDER — ONDANSETRON 4 MG/1
8 TABLET, ORALLY DISINTEGRATING ORAL EVERY 8 HOURS PRN
Status: DISCONTINUED | OUTPATIENT
Start: 2025-03-19 | End: 2025-03-19 | Stop reason: HOSPADM

## 2025-03-19 RX ORDER — ACETAMINOPHEN 500 MG
1000 TABLET ORAL EVERY 6 HOURS
Status: DISCONTINUED | OUTPATIENT
Start: 2025-03-19 | End: 2025-03-19 | Stop reason: HOSPADM

## 2025-03-19 RX ORDER — ROPIVACAINE HYDROCHLORIDE 5 MG/ML
INJECTION, SOLUTION EPIDURAL; INFILTRATION; PERINEURAL
Status: COMPLETED | OUTPATIENT
Start: 2025-03-19 | End: 2025-03-19

## 2025-03-19 RX ORDER — CLONIDINE 100 UG/ML
INJECTION, SOLUTION EPIDURAL
Status: DISCONTINUED
Start: 2025-03-19 | End: 2025-03-19 | Stop reason: HOSPADM

## 2025-03-19 RX ORDER — AMOXICILLIN 250 MG
1 CAPSULE ORAL 2 TIMES DAILY
Status: DISCONTINUED | OUTPATIENT
Start: 2025-03-19 | End: 2025-03-19 | Stop reason: HOSPADM

## 2025-03-19 RX ORDER — HALOPERIDOL LACTATE 5 MG/ML
0.5 INJECTION, SOLUTION INTRAMUSCULAR EVERY 10 MIN PRN
Status: DISCONTINUED | OUTPATIENT
Start: 2025-03-19 | End: 2025-03-19 | Stop reason: HOSPADM

## 2025-03-19 RX ORDER — FENTANYL CITRATE 50 UG/ML
INJECTION, SOLUTION INTRAMUSCULAR; INTRAVENOUS
Status: DISCONTINUED | OUTPATIENT
Start: 2025-03-19 | End: 2025-03-19

## 2025-03-19 RX ORDER — DEXTROSE MONOHYDRATE AND SODIUM CHLORIDE 5; .9 G/100ML; G/100ML
INJECTION, SOLUTION INTRAVENOUS CONTINUOUS
Status: DISCONTINUED | OUTPATIENT
Start: 2025-03-19 | End: 2025-03-19 | Stop reason: HOSPADM

## 2025-03-19 RX ORDER — PROPOFOL 10 MG/ML
VIAL (ML) INTRAVENOUS
Status: DISCONTINUED | OUTPATIENT
Start: 2025-03-19 | End: 2025-03-19

## 2025-03-19 RX ORDER — GLUCAGON 1 MG
1 KIT INJECTION
Status: DISCONTINUED | OUTPATIENT
Start: 2025-03-19 | End: 2025-03-19 | Stop reason: HOSPADM

## 2025-03-19 RX ORDER — ROPIVACAINE HYDROCHLORIDE 2 MG/ML
INJECTION, SOLUTION EPIDURAL; INFILTRATION; PERINEURAL CONTINUOUS
Status: DISCONTINUED | OUTPATIENT
Start: 2025-03-19 | End: 2025-03-19 | Stop reason: HOSPADM

## 2025-03-19 RX ORDER — ONDANSETRON HYDROCHLORIDE 2 MG/ML
INJECTION, SOLUTION INTRAVENOUS
Status: DISCONTINUED | OUTPATIENT
Start: 2025-03-19 | End: 2025-03-19

## 2025-03-19 RX ORDER — EPHEDRINE SULFATE 50 MG/ML
INJECTION, SOLUTION INTRAVENOUS
Status: DISCONTINUED | OUTPATIENT
Start: 2025-03-19 | End: 2025-03-19

## 2025-03-19 RX ORDER — SODIUM CHLORIDE 9 MG/ML
INJECTION, SOLUTION INTRAVENOUS CONTINUOUS
Status: DISCONTINUED | OUTPATIENT
Start: 2025-03-19 | End: 2025-03-19 | Stop reason: HOSPADM

## 2025-03-19 RX ORDER — CELECOXIB 200 MG/1
400 CAPSULE ORAL
Status: DISCONTINUED | OUTPATIENT
Start: 2025-03-19 | End: 2025-03-19

## 2025-03-19 RX ORDER — CELECOXIB 200 MG/1
400 CAPSULE ORAL
Status: COMPLETED | OUTPATIENT
Start: 2025-03-19 | End: 2025-03-19

## 2025-03-19 RX ORDER — MUPIROCIN 20 MG/G
OINTMENT TOPICAL
Status: DISCONTINUED | OUTPATIENT
Start: 2025-03-19 | End: 2025-03-19 | Stop reason: HOSPADM

## 2025-03-19 RX ORDER — OXYCODONE HYDROCHLORIDE 5 MG/1
5 TABLET ORAL
Status: DISCONTINUED | OUTPATIENT
Start: 2025-03-19 | End: 2025-03-19 | Stop reason: HOSPADM

## 2025-03-19 RX ORDER — DEXAMETHASONE SODIUM PHOSPHATE 10 MG/ML
INJECTION, SOLUTION INTRA-ARTICULAR; INTRALESIONAL; INTRAMUSCULAR; INTRAVENOUS; SOFT TISSUE
Status: DISCONTINUED
Start: 2025-03-19 | End: 2025-03-19 | Stop reason: HOSPADM

## 2025-03-19 RX ORDER — ACETAMINOPHEN 500 MG
1000 TABLET ORAL
Status: COMPLETED | OUTPATIENT
Start: 2025-03-19 | End: 2025-03-19

## 2025-03-19 RX ORDER — PREGABALIN 75 MG/1
75 CAPSULE ORAL NIGHTLY
Status: DISCONTINUED | OUTPATIENT
Start: 2025-03-19 | End: 2025-03-19 | Stop reason: HOSPADM

## 2025-03-19 RX ORDER — FENTANYL CITRATE 50 UG/ML
25-200 INJECTION, SOLUTION INTRAMUSCULAR; INTRAVENOUS
Status: DISCONTINUED | OUTPATIENT
Start: 2025-03-19 | End: 2025-03-19 | Stop reason: HOSPADM

## 2025-03-19 RX ORDER — MIDAZOLAM HYDROCHLORIDE 1 MG/ML
.5-4 INJECTION, SOLUTION INTRAMUSCULAR; INTRAVENOUS
Status: DISCONTINUED | OUTPATIENT
Start: 2025-03-19 | End: 2025-03-19 | Stop reason: HOSPADM

## 2025-03-19 RX ORDER — PROCHLORPERAZINE EDISYLATE 5 MG/ML
5 INJECTION INTRAMUSCULAR; INTRAVENOUS EVERY 6 HOURS PRN
Status: DISCONTINUED | OUTPATIENT
Start: 2025-03-19 | End: 2025-03-19 | Stop reason: HOSPADM

## 2025-03-19 RX ORDER — LIDOCAINE HYDROCHLORIDE 20 MG/ML
INJECTION INTRAVENOUS
Status: DISCONTINUED | OUTPATIENT
Start: 2025-03-19 | End: 2025-03-19

## 2025-03-19 RX ORDER — DEXAMETHASONE SODIUM PHOSPHATE 4 MG/ML
INJECTION, SOLUTION INTRA-ARTICULAR; INTRALESIONAL; INTRAMUSCULAR; INTRAVENOUS; SOFT TISSUE
Status: DISCONTINUED | OUTPATIENT
Start: 2025-03-19 | End: 2025-03-19

## 2025-03-19 RX ORDER — ROCURONIUM BROMIDE 10 MG/ML
INJECTION, SOLUTION INTRAVENOUS
Status: DISCONTINUED | OUTPATIENT
Start: 2025-03-19 | End: 2025-03-19

## 2025-03-19 RX ORDER — MUPIROCIN 20 MG/G
1 OINTMENT TOPICAL 2 TIMES DAILY
Status: DISCONTINUED | OUTPATIENT
Start: 2025-03-19 | End: 2025-03-19 | Stop reason: HOSPADM

## 2025-03-19 RX ORDER — CELECOXIB 200 MG/1
200 CAPSULE ORAL DAILY
Status: DISCONTINUED | OUTPATIENT
Start: 2025-03-20 | End: 2025-03-19 | Stop reason: HOSPADM

## 2025-03-19 RX ORDER — CEFAZOLIN 2 G/1
2 INJECTION, POWDER, FOR SOLUTION INTRAMUSCULAR; INTRAVENOUS
Status: DISCONTINUED | OUTPATIENT
Start: 2025-03-19 | End: 2025-03-19 | Stop reason: HOSPADM

## 2025-03-19 RX ORDER — METOCLOPRAMIDE HYDROCHLORIDE 5 MG/ML
10 INJECTION INTRAMUSCULAR; INTRAVENOUS EVERY 10 MIN PRN
Status: DISCONTINUED | OUTPATIENT
Start: 2025-03-19 | End: 2025-03-19 | Stop reason: HOSPADM

## 2025-03-19 RX ORDER — HYDROMORPHONE HYDROCHLORIDE 1 MG/ML
0.2 INJECTION, SOLUTION INTRAMUSCULAR; INTRAVENOUS; SUBCUTANEOUS EVERY 5 MIN PRN
Status: DISCONTINUED | OUTPATIENT
Start: 2025-03-19 | End: 2025-03-19 | Stop reason: HOSPADM

## 2025-03-19 RX ORDER — ACETAMINOPHEN 500 MG
1000 TABLET ORAL
Status: DISCONTINUED | OUTPATIENT
Start: 2025-03-19 | End: 2025-03-19

## 2025-03-19 RX ORDER — BUPIVACAINE HYDROCHLORIDE 2.5 MG/ML
INJECTION, SOLUTION EPIDURAL; INFILTRATION; INTRACAUDAL; PERINEURAL
Status: COMPLETED | OUTPATIENT
Start: 2025-03-19 | End: 2025-03-19

## 2025-03-19 RX ORDER — KETAMINE HCL IN 0.9 % NACL 50 MG/5 ML
SYRINGE (ML) INTRAVENOUS
Status: DISCONTINUED | OUTPATIENT
Start: 2025-03-19 | End: 2025-03-19

## 2025-03-19 RX ADMIN — ROPIVACAINE HYDROCHLORIDE 10 ML: 5 INJECTION EPIDURAL; INFILTRATION; PERINEURAL at 06:03

## 2025-03-19 RX ADMIN — BUPIVACAINE HYDROCHLORIDE 30 ML: 2.5 INJECTION, SOLUTION EPIDURAL; INFILTRATION; INTRACAUDAL; PERINEURAL at 06:03

## 2025-03-19 RX ADMIN — FENTANYL CITRATE 100 MCG: 50 INJECTION, SOLUTION INTRAMUSCULAR; INTRAVENOUS at 07:03

## 2025-03-19 RX ADMIN — CEFAZOLIN 2 G: 2 INJECTION, POWDER, FOR SOLUTION INTRAMUSCULAR; INTRAVENOUS at 07:03

## 2025-03-19 RX ADMIN — Medication 25 MG: at 07:03

## 2025-03-19 RX ADMIN — TRANEXAMIC ACID 1000 MG: 100 INJECTION INTRAVENOUS at 09:03

## 2025-03-19 RX ADMIN — ROCURONIUM BROMIDE 50 MG: 10 INJECTION, SOLUTION INTRAVENOUS at 07:03

## 2025-03-19 RX ADMIN — GLYCOPYRROLATE 0.2 MG: 0.2 INJECTION, SOLUTION INTRAMUSCULAR; INTRAVENOUS at 07:03

## 2025-03-19 RX ADMIN — FENTANYL CITRATE 50 MCG: 50 INJECTION INTRAMUSCULAR; INTRAVENOUS at 06:03

## 2025-03-19 RX ADMIN — TRANEXAMIC ACID 1000 MG: 100 INJECTION INTRAVENOUS at 07:03

## 2025-03-19 RX ADMIN — PHENYLEPHRINE HYDROCHLORIDE 100 MCG: 10 INJECTION INTRAVENOUS at 08:03

## 2025-03-19 RX ADMIN — MIDAZOLAM 2 MG: 1 INJECTION INTRAMUSCULAR; INTRAVENOUS at 06:03

## 2025-03-19 RX ADMIN — LIDOCAINE HYDROCHLORIDE 20 MG: 20 INJECTION INTRAVENOUS at 07:03

## 2025-03-19 RX ADMIN — Medication: at 10:03

## 2025-03-19 RX ADMIN — CELECOXIB 400 MG: 200 CAPSULE ORAL at 05:03

## 2025-03-19 RX ADMIN — ACETAMINOPHEN 1000 MG: 500 TABLET ORAL at 05:03

## 2025-03-19 RX ADMIN — EPHEDRINE SULFATE 5 MG: 50 INJECTION INTRAVENOUS at 08:03

## 2025-03-19 RX ADMIN — ROCURONIUM BROMIDE 20 MG: 10 INJECTION, SOLUTION INTRAVENOUS at 09:03

## 2025-03-19 RX ADMIN — EPHEDRINE SULFATE 10 MG: 50 INJECTION INTRAVENOUS at 09:03

## 2025-03-19 RX ADMIN — SODIUM CHLORIDE: 0.9 INJECTION, SOLUTION INTRAVENOUS at 07:03

## 2025-03-19 RX ADMIN — SODIUM CHLORIDE, SODIUM GLUCONATE, SODIUM ACETATE, POTASSIUM CHLORIDE, MAGNESIUM CHLORIDE, SODIUM PHOSPHATE, DIBASIC, AND POTASSIUM PHOSPHATE: .53; .5; .37; .037; .03; .012; .00082 INJECTION, SOLUTION INTRAVENOUS at 08:03

## 2025-03-19 RX ADMIN — ONDANSETRON 4 MG: 2 INJECTION INTRAMUSCULAR; INTRAVENOUS at 10:03

## 2025-03-19 RX ADMIN — EPHEDRINE SULFATE 5 MG: 50 INJECTION INTRAVENOUS at 07:03

## 2025-03-19 RX ADMIN — DEXAMETHASONE SODIUM PHOSPHATE 8 MG: 4 INJECTION, SOLUTION INTRAMUSCULAR; INTRAVENOUS at 07:03

## 2025-03-19 RX ADMIN — SUGAMMADEX 200 MG: 100 INJECTION, SOLUTION INTRAVENOUS at 10:03

## 2025-03-19 RX ADMIN — FAMOTIDINE 20 MG: 10 INJECTION, SOLUTION INTRAVENOUS at 07:03

## 2025-03-19 RX ADMIN — MUPIROCIN: 20 OINTMENT TOPICAL at 05:03

## 2025-03-19 RX ADMIN — PHENYLEPHRINE HYDROCHLORIDE 0.3 MCG/KG/MIN: 10 INJECTION INTRAVENOUS at 08:03

## 2025-03-19 RX ADMIN — PROPOFOL 140 MG: 10 INJECTION, EMULSION INTRAVENOUS at 07:03

## 2025-03-19 NOTE — ANESTHESIA POSTPROCEDURE EVALUATION
Anesthesia Post Evaluation    Patient: Rebecca Johnston Zollinger    Procedure(s) Performed: Procedure(s) (LRB):  ARTHROPLASTY, SHOULDER, TOTAL,COMPLEX REVERSE, WITH WEDGE AUGENTATION (Right)  TENODESIS, BICEPS, OPEN (Right)    Final Anesthesia Type: general      Patient location during evaluation: PACU  Patient participation: Yes- Able to Participate  Level of consciousness: awake and alert and oriented  Post-procedure vital signs: reviewed and stable  Pain management: adequate  Airway patency: patent  TRES mitigation strategies: Use of major conduction anesthesia (spinal/epidural) or peripheral nerve block and Multimodal analgesia  PONV status at discharge: No PONV  Anesthetic complications: no      Cardiovascular status: blood pressure returned to baseline and hemodynamically stable  Respiratory status: unassisted, spontaneous ventilation and room air  Hydration status: euvolemic  Follow-up not needed.              Vitals Value Taken Time   /55 03/19/25 12:05   Temp 36.6 °C (97.9 °F) 03/19/25 12:05   Pulse 63 03/19/25 12:15   Resp 23 03/19/25 12:15   SpO2 97 % 03/19/25 12:15   Vitals shown include unfiled device data.      Event Time   Out of Recovery 12:00:00         Pain/Joce Score: Pain Rating Prior to Med Admin: 0 (3/19/2025 11:30 AM)  Pain Rating Post Med Admin: 0 (3/19/2025 11:30 AM)  Joce Score: 8 (3/19/2025 10:28 AM)

## 2025-03-19 NOTE — BRIEF OP NOTE
[place] - Surgery ([facility]  Brief Operative Note    Surgery Date: 3/19/2025     Surgeons and Role:     * Emanuel Aguilar MD - Primary    Assisting Surgeon: None    Pre-op Diagnosis:  Osteoarthritis of right glenohumeral joint [M19.011]    Post-op Diagnosis:  Post-Op Diagnosis Codes:     * Osteoarthritis of right glenohumeral joint [M19.011]    Procedure(s) (LRB):  ARTHROPLASTY, SHOULDER, TOTAL,COMPLEX REVERSE, WITH WEDGE AUGENTATION (Right)  TENODESIS, BICEPS, OPEN (Right)    Anesthesia: General    Operative Findings: OA of the Glenohumeral joint noted    Estimated Blood Loss: 100 mL         Specimens:   Specimen (24h ago, onward)      None            * No specimens in log *        Discharge Note    OUTCOME: Patient tolerated treatment/procedure well without complication and is now ready for discharge.    DISPOSITION: Home or Self Care    FINAL DIAGNOSIS:  <principal problem not specified>    FOLLOWUP: In clinic    DISCHARGE INSTRUCTIONS:  No discharge procedures on file.     .

## 2025-03-19 NOTE — ANESTHESIA PROCEDURE NOTES
Peripheral Block    Patient location during procedure: pre-op   Block not for primary anesthetic.  Reason for block: at surgeon's request and post-op pain management   Post-op Pain Location: right shoulder   Start time: 3/19/2025 6:36 AM  Timeout: 3/19/2025 6:31 AM   End time: 3/19/2025 6:46 AM    Staffing  Authorizing Provider: Basilio Valencia MD  Performing Provider: Basilio Valencia MD    Staffing  Performed by: Basilio Valencia MD  Authorized by: Basilio Valencia MD    Preanesthetic Checklist  Completed: patient identified, IV checked, site marked, risks and benefits discussed, surgical consent, monitors and equipment checked, pre-op evaluation and timeout performed  Peripheral Block  Patient position: sitting  Prep: ChloraPrep and site prepped and draped  Patient monitoring: heart rate, cardiac monitor, continuous pulse ox, continuous capnometry and frequent blood pressure checks  Block type: interscalene  Laterality: right  Injection technique: continuous  Needle  Needle type: Tuohy   Needle gauge: 17 G  Needle length: 3.5 in  Needle localization: anatomical landmarks and ultrasound guidance  Needle insertion depth: 5 cm  Catheter type: non-stimulating  Catheter size: 20 G  Catheter at skin depth: 10 cm  Test dose: lidocaine 1.5% with Epi 1-to-200,000 and negative   -ultrasound image captured on disc.  Assessment  Injection assessment: negative aspiration, negative parasthesia and local visualized surrounding nerve  Paresthesia pain: none  Heart rate change: no  Slow fractionated injection: yes  Pain Tolerance: comfortable throughout block and no complaints  Medications:    Medications: ropivacaine (NAROPIN) injection 0.5% - Perineural   10 mL - 3/19/2025 6:40:00 AM    Additional Notes  VSS.  [facility] RN monitoring vitals throughout procedure.  Patient tolerated procedure well.

## 2025-03-19 NOTE — TRANSFER OF CARE
"Anesthesia Transfer of Care Note    Patient: Rebecca Johnston Zollinger    Procedure(s) Performed: Procedure(s) (LRB):  ARTHROPLASTY, SHOULDER, TOTAL,COMPLEX REVERSE, WITH WEDGE AUGENTATION (Right)  TENODESIS, BICEPS, OPEN (Right)    Patient location: PACU    Anesthesia Type: general    Transport from OR: Transported from OR on 6-10 L/min O2 by face mask with adequate spontaneous ventilation    Post pain: adequate analgesia    Post assessment: tolerated procedure well and no apparent anesthetic complications    Post vital signs: stable    Level of consciousness: awake, alert and oriented    Nausea/Vomiting: no nausea/vomiting    Complications: none    Transfer of care protocol was followed      Last vitals: Visit Vitals  /68   Pulse 60   Temp 36.6 °C (97.9 °F) (Temporal)   Resp 15   Ht 5' 4" (1.626 m)   Wt 75.8 kg (167 lb)   SpO2 95%   Breastfeeding No   BMI 28.67 kg/m²     "

## 2025-03-19 NOTE — ANESTHESIA PROCEDURE NOTES
Intubation    Date/Time: 3/19/2025 7:13 AM    Performed by: Darby Argueta CRNA  Authorized by: Basilio Valencia MD    Intubation:     Induction:  Intravenous    Intubated:  Postinduction    Mask Ventilation:  Easy with oral airway    Attempts:  1    Attempted By:  CRNA    Method of Intubation:  Video laryngoscopy    Blade:  Ortega 3    Laryngeal View Grade: Grade I - full view of cords      Difficult Airway Encountered?: No      Complications:  None    Airway Device:  Oral endotracheal tube    Airway Device Size:  7.0    Style/Cuff Inflation:  Cuffed    Tube secured:  21    Secured at:  The lips    Placement Verified By:  Capnometry    Complicating Factors:  None    Findings Post-Intubation:  BS equal bilateral and atraumatic/condition of teeth unchanged

## 2025-03-19 NOTE — ANESTHESIA PREPROCEDURE EVALUATION
03/19/2025  Rebecca Johnston Zollinger is a 69 y.o., female for R rTSA      Pre-op Assessment    I have reviewed the Patient Summary Reports.     I have reviewed the Nursing Notes. I have reviewed the NPO Status.   I have reviewed the Medications.     Review of Systems  Anesthesia Hx:               Denies Personal Hx of Anesthesia complications.                    Social:  Former Smoker, No Alcohol Use Quit smoking 20 years ago.      Cardiovascular:  Exercise tolerance: good   Hypertension       Denies Angina.     hyperlipidemia                               Pulmonary:       Denies Shortness of breath.                  Hepatic/GI:     GERD                Musculoskeletal:     Osteoarthritis of right glenohumeral joint  Chronic right shoulder pain         Spine Disorders: thoracic and cervical            Endocrine:     prediabetes            Physical Exam  General: Alert, Cooperative and Oriented    Airway:  Mallampati: II / I  Mouth Opening: Normal  TM Distance: Normal  Tongue: Normal  Neck ROM: Normal ROM    Dental:  Intact    Chest/Lungs:  Normal Respiratory Rate    Heart:  Rate: Bradycardia  Rhythm: Regular Rhythm      Past Medical History:   Diagnosis Date    Anticoagulant long-term use     Hypertension      Past Surgical History:   Procedure Laterality Date    EPIDURAL STEROID INJECTION N/A 10/10/2024    Procedure: CERVICAL C7/T1 IL SANJUANA *ASPIRIN OTC* HOLD FOR 5 DAYS  **GIOVANNI PT**;  Surgeon: Devon Gamble MD;  Location: [facility] PAIN MGT;  Service: Pain Management;  Laterality: N/A;  [phone]  2 WK F/U DEEPALI    TRANSFORAMINAL EPIDURAL INJECTION OF STEROID Right 8/14/2023    Procedure: INJECTION, STEROID, EPIDURAL, TRANSFORAMINAL APPROACH, RIGHT T12/L1 SOONER DATE;  Surgeon: Isi Womack MD;  Location: [facility] PAIN [facility]GT;  Service: Pain Management;  Laterality: Right;    TRANSFORAMINAL EPIDURAL INJECTION OF  STEROID Right 12/18/2023    Procedure: LUMBAR TRANSFORAMINAL RIGHT T12/L1 AND L1/2;  Surgeon: Isi Womack MD;  Location: [facility] PAIN MGT;  Service: Pain Management;  Laterality: Right;  [phone]  2 WK F/U DEEPALI    TRANSFORAMINAL EPIDURAL INJECTION OF STEROID Right 8/29/2024    Procedure: THORACIC TRANSFORAMINAL RIGHT T12/L1 AND L1/2 *ASPIRIN OTC* HOLD FOR 5 DAYS;  Surgeon: Isi Womack MD;  Location: [facility] PAIN MGT;  Service: Pain Management;  Laterality: Right;  [phone]  2 WK F/U DEEPALI    TRANSFORAMINAL EPIDURAL INJECTION OF STEROID Right 11/4/2024    Procedure: THORACIC TRANSFORAMINAL RIGHT T7/8 *ASPIRIN OTC* HOLD FOR 5 DAYS;  Surgeon: Isi Womack MD;  Location: [facility] PAIN MGT;  Service: Pain Management;  Laterality: Right;  [phone]  2 WK F/U DEEPALI    TRANSFORAMINAL EPIDURAL INJECTION OF STEROID Left 2/17/2025    Procedure: THORACIC TRANSFORAMINAL LEFT T7/8 *ASPIRIN OTC*;  Surgeon: Isi Womack MD;  Location: [facility] PAIN [facility]GT;  Service: Pain Management;  Laterality: Left;  2 WK F/U AMANDA     Anesthesia Assessment: Preoperative EQUATION    Planned Procedure: Procedure(s) (LRB):  ARTHROPLASTY, SHOULDER, TOTAL, REVERSE (Right)  Requested Anesthesia Type:General  Surgeon: Emanuel Aguilar MD  Service: Orthopedics  Known or anticipated Date of Surgery:3/19/2025    Surgeon notes: reviewed    Electronic QUestionnaire Assessment completed via nurse interview with patient.      Triage considerations:     The patient has no apparent active cardiac condition (No unstable coronary Syndrome such as severe unstable angina or recent [<1 month] myocardial infarction, decompensated CHF, severe valvular   disease or significant arrhythmia)    Previous anesthesia records:Not available    Last PCP note: within 3 months   Subspecialty notes: Ortho, Pain Management, Spine Service    Other important co-morbidities: HLD and HTN GERD  No EKG/Echo/Stress Test results in cardiac procedure tab of chart.  Please see external EKG  "results in  that was scanned to chart with PCP clearance note.     Tests already available:  Results have been reviewed.             Instructions given. (See in Nurse's note) Pre op medication instructions sent via portal message and by phone [phone]; pt verbalized understanding.    Optimization: Pt needs PCP clearance prior to surgery.      Plan:   Pt saw PCP Dr. Rasmussen 2/3/25 for pre op clearance.    Navigation: Dr. Rasmussen cleared pt for surgery and form was scanned to media tab in chart.    Ht: 5'4"  Wt: 77.1 kg (170 lb)  BMI: 29.18    Anesthesia Plan  Type of Anesthesia, risks & benefits discussed:    Anesthesia Type: Gen ETT, Regional  Intra-op Monitoring Plan: Standard ASA Monitors  Post Op Pain Control Plan: multimodal analgesia, IV/PO Opioids PRN and peripheral nerve block  Induction:  IV  Informed Consent: Informed consent signed with the Patient and all parties understand the risks and agree with anesthesia plan.  All questions answered.   ASA Score: 2    Ready For Surgery From Anesthesia Perspective.     .  "

## 2025-03-19 NOTE — PLAN OF CARE
VSS. Pt able to tolerate oral liquids. Pt denies c/o pain. Dressing intact. Spouse received home meds per bedside delivery. No distress noted. Pt states she is ready for D/C. D/C and CADD pump instructions reviewed with pt and spouse, verbalized understanding.

## 2025-03-19 NOTE — OP NOTE
[facility] ([facility])  Surgery Department  Operative Note    SUMMARY     Date of Procedure: 3/19/2025     Procedure: Procedure(s) (LRB):  ARTHROPLASTY, SHOULDER, TOTAL,COMPLEX REVERSE, WITH WEDGE AUGENTATION (Right)  TENODESIS, BICEPS, OPEN (Right)     Surgeons and Role:     * Emanuel Aguilar MD - Primary    First Assistant:   MIRNA Ware      **The use of a first assistant was necessary for positioning, approach, retraction, exposure, bone cuts,  intraoperative decision making and closure.  The case could not have been done without the use of a qualified first assistant.      Assistant:  Jasiel Nuñez      Pre-Operative Diagnosis: Osteoarthritis of right glenohumeral joint [M19.011]    Post-Operative Diagnosis: Post-Op Diagnosis Codes:     * Osteoarthritis of right glenohumeral joint [M19.011]    Anesthesia: General    Technical Procedures Used:     3/19/2025     PREOPERATIVE DIAGNOSIS:   1. Right shoulder rotator cuff arthropathy with glenoid bone loss.   2. Right shoulder biceps tendinopathy    POSTOPERATIVE DIAGNOSIS:   1. Right shoulder rotator cuff arthropathy with glenoid bone loss.   2. Right shoulder biceps tendinopathy    PROCEDURE:   1. Right reverse total shoulder arthroplasty (complex with glenoid wedge augmentation)  2. Right shoulder biceps tenodesis    SURGEON: Emanuel Aguilar M.D.     **Because of the complex nature of the cuff tear arthropathy and the glenoid bone loss requiring a custom guide, step cut reamer and full glenoid augmented baseplate, additional time and resources were used for the case.    First Assistant:   MIRNA Ware      **The use of a first assistant was necessary for positioning, approach, retraction, exposure, bone cuts,  intraoperative decision making and closure.  The case could not have been done without the use of a qualified first assistant.      Assistant:  Jasiel Nuñez      COMPLICATIONS: None.     POSITION: Beach chair.      ANESTHESIA: General endotracheal plus right upper extremity interscalene block with catheter    IMPLANTS USED:   1. [facility] [employer] onla reverse total shoulder replacement   2A Flex Stem - 135 degree cut   +0 high offset (3.5) tray   +6 C poly insert   25 mm full wedge baseplate   36 standard glenosphere    INDICATIONS: The patient is a 69 year-old female with a history of rotator cuff arthropathy with glenoid bone loss.  She failed nonoperative management, and agreed with the postoperative restrictions for reverse total shoulder replacement. All risks and benefits were discussed including infection, dislocation, instability, persistent pain and neurovascular injury. The patient seemed to understand and wished to proceed.     DESCRIPTION OF PROCEDURE: The patient was brought in the room. After undergoing a right upper extremity interscalene block with catheter and general endotracheal anesthesia, she was placed in a well-padded beach chair position. The right upper extremity was prepped and draped in usual sterile fashion. Perioperative antibiotics had been given prior to the procedure including ancef and vancomycin, and space suits and Ioban draping were used.     The standard deltopectoral approach was performed slightly medially. An incision was made from 3 cm medial to the AC joint, down to the lateral deltoid insertion. After going through skin and subcutaneous tissues, full-thickness skin flaps were developed. Excellent hemostasis was achieved. Blunt dissection was taken down. The deltopectoral interval was identified. The cephalic vein was identified and kept lateral during the case. The interval was developed, and perforating branches to the deltoid were coagulated. The subdeltoid fascia was released for visualization. The top 1 cm of the pectoralis was released for visualization. The axillary nerve was identified and protected during the entire case. The 3 sisters were suture ligated with #1  Vicryl ties. The subscapularis was released directly or peeled off its insertion on the humerus with tagging sutures placed for later transosseous repair. The bursa was removed to allow exposure.     BICEPS TENODESIS:  The long head of the biceps was identified and found to be tendinopathic.  This was released within the groove and was lifted up for planned tenodesis.  #2 Orthocord sutures were placed in the biceps and it was tagged into the pec tendon insertion. The proximal aspect of the biceps was removed and discarded.    After soft tissue releases, the proximal humerus was dislocated. No residual supraspinatus or infraspinatus remained. Small osteophytes were removed. The neck cut was made with 30 degrees of retroversion across the humeral neck with an extramedullary guide at 135-degrees.  A starting reamer was used up to a size 1/2 followed by sequential reaming up to a size 3 stem. Ultimately a size 2A 127.5-degree stem was used.     Attention turned to exposure of the glenoid. A 360 degree subscapularis release was performed. An inferior capsulotomy was performed. The labrum was removed and completely 360 degrees.  Forked glenoid retractor was placed anteriorly, and a posterior retractor and forked inferior retractor were placed to allow complete exposure of the glenoid.  Excellent visualization of the glenoid was achieved. The lower 1/2 of the glenoid was achieved under direct visualization, and the center aspect of the lower half was used for our central pin. Because of the significant nearly 30 degree superior inclination of the glenoid, we utilized our pre-op plan of a full 25-degree wedge baseplate with a wedge reamer.  Our guide wire was aimed at 10 degree inferior tilt to the scapula and using the customized guide from the [employer]print and planning on a full wedge with the wedge reamer on our template to ream out the inferior bone to allow for the proper orientation of our wedge at the 12-o'clock  position.   A greater than 25 mm depth was achieved, which was judged to be adequate for our central peg. A tap followed by sequential wedge reaming got us down to bleeding bone. Our permanent 30-mm 6.5 mm screw with wedge baseplate was then used and got excellent stability.  The posterior-superior screw was a compression screw and the other three were variable angle locking screws.  All four were placed.  A trial followed by a permanent 36 standard head was put in place.     Attention was turned back to the humerus. The finishing reamings were made, and our trial +0 low offset tray and +6 poly insert was inserted. This was judged to be a very stable. There was no evidence of impinging bone or osteophytes. The deltoid was under good tension, and full range of motion was achievable with excellent forward elevation.     The shoulder was dislocated again, and drill holes were placed with a 2 mm drill bit for subscap repair.  #2 orthocord sutures were placed through the bone and to be able to go around the stem.  Pulsatile lavage washed our area to remove all bony pieces.  The permanent 2A 127.5-degree stem with +0 low offset tray and +6 (12.5-degree) poly was inserted without difficulty to give us a 135-degree construct.  This was relocated, and was judged to have full range of motion including in abduction, external and internal rotation, with no evidence of impingement or instability.     The deltopectoral interval was reapproximated with 0 Vicryl. The skin was closed with 3-0 Vicryl, 4-0 Monocryl and Mastisol, Steri-Strips, Xeroform, 4 x 4 fluffs, ABD pads and tape.     The patient was placed in a sling and pillow for protection, was extubated in the room, transferred to Recovery Room in stable condition accompanied by her physician. There were no complications in the case. I was present, scrubbed and did perform all critical portions of the case.     Postop plan for the patient is to proceed with our standard  reverse total shoulder replacement protocol.         Emanuel Aguilar MD      Description of the Findings of the Procedure: above    Significant Surgical Tasks Conducted by the Assistant(s), if Applicable: none    Complications: No    Estimated Blood Loss (EBL): 0 mL           Implants:   Implant Name Type Inv. Item Serial No.  Lot No. LRB No. Used Action   BLUEPRINT PATIENT SPECIFIC REVERSED GLENOID GUIDE MODEL [id number]     [id number] Right 1 Implanted and Explanted   PIN GUIDE AEQUALIS [id number] - [id number]  PIN GUIDE AEQUALIS [id number]  [employer] [id number] Right 1 Implanted and Explanted   TORNIER PERFORM REVERSED AUGMENTED GLENOID FULL-WEDGE AUGMENT BSEPLATE 25 MM/ ANGLE 15 DEGREE   [id number]   Right 1 Implanted   SPTERE GLENOID AEQUALIS 36MM - [id number]  SPTERE GLENOID AEQUALIS 36MM [id number] [employer]  Right 1 Implanted   AEQUALIS ASCEND FLEX STANDARD PTC HUMERAL STEM SIZE 2A ANGLE 127.5 DEGREE, L= 70 MM, COAT PTC   [id number]   Right 1 Implanted   TRAY INSTR OFFSET REV 3.5MM - [id number]  TRAY INSTR OFFSET REV 3.5MM [id number] [employer]  Right 1 Implanted   TORNIER FLEX SHOULDER SYSTEM, FLEX REVERSED INSERT, THICKNESS PLUS 6 MM, ANGLE C(7.5 DEGREE)/ 36 MM   [id number]   Right 1 Implanted   BASEPLATE GLENOID REV 30X6.5MM - [id number]  BASEPLATE GLENOID REV 30X6.5MM  [employer]  Right 1 Implanted   PERIPHERAL SCREW      Right 1 Implanted   PERIPHERAL SCREW      Right 2 Implanted   PERPHERIAL SCREW      Right 1 Implanted       Specimens:   Specimen (24h ago, onward)      None                    Condition: Good    Disposition: PACU - hemodynamically stable.    Attestation: I was present and scrubbed for the entire procedure.    Discharge Note    SUMMARY     Admit Date: 3/19/2025    Discharge Date and Time:  03/19/2025 10:51 AM    Hospital Course (synopsis of major diagnoses, care, treatment, and services provided during the course of the hospital stay): outpatient surgery     Final Diagnosis:  Post-Op Diagnosis Codes:     * Osteoarthritis of right glenohumeral joint [M19.011]    Disposition: Home or Self Care    Follow Up/Patient Instructions:     Medications:  Reconciled Home Medications:      Medication List        CONTINUE taking these medications      alendronate 70 MG tablet  Commonly known as: FOSAMAX  Take 70 mg by mouth every 7 days.     * aspirin 81 MG EC tablet  Commonly known as: ECOTRIN  Take 81 mg by mouth once daily.     * aspirin 325 MG tablet  Take 1 tablet (325 mg total) by mouth once daily. for 21 days     buPROPion 150 MG TB24 tablet  Commonly known as: WELLBUTRIN XL  Take 1 tablet by mouth every morning.     celecoxib 200 MG capsule  Commonly known as: CeleBREX  Take 1 capsule (200 mg total) by mouth once daily.     estradioL 0.01 % (0.1 mg/gram) vaginal cream  Commonly known as: ESTRACE  Place vaginally once daily.     FLUoxetine 20 MG capsule  Take 1 capsule by mouth once daily.     HYDROcodone-acetaminophen 7.5-325 mg/15 mL oral liquid  Take by mouth 4 (four) times daily as needed for Pain.     ipratropium 42 mcg (0.06 %) nasal spray  Commonly known as: ATROVENT  2 sprays by Each Nostril route 4 (four) times daily.     methocarbamoL 500 MG Tab  Commonly known as: Robaxin  TAKE 1 TABLET BY MOUTH THREE TIMES A DAY AS NEEDED MUSCLE PAIN     nitrofurantoin (macrocrystal-monohydrate) 100 MG capsule  Commonly known as: MACROBID  Take 100 mg by mouth 2 (two) times daily.     oxyCODONE-acetaminophen  mg per tablet  Commonly known as: PERCOCET  Take 1 tablet by mouth every 6 (six) hours as needed for Pain.     pantoprazole 40 MG tablet  Commonly known as: PROTONIX  Take 1 tablet by mouth once daily.     phenazopyridine 200 MG tablet  Commonly known as: PYRIDIUM  Take 200 mg by mouth as needed for Pain.     prazosin 1 MG Cap  Commonly known as: MINIPRESS  Take 2 capsules by mouth every evening.     promethazine 25 MG tablet  Commonly known as: PHENERGAN  Take 1 tablet (25 mg total) by  mouth every 6 (six) hours as needed for Nausea.     RESTASIS 0.05 % ophthalmic emulsion  Generic drug: cycloSPORINE  Place 1 drop into both eyes 3 (three) times daily.     rosuvastatin 5 MG tablet  Commonly known as: CRESTOR  Take 1 tablet by mouth every evening.     tiZANidine 2 MG tablet  Commonly known as: ZANAFLEX  1 at bedtime for 3 nights then 2 every night for 3 nights then 3 every night to follow. Stay at the most comfortable dose.     traMADoL 50 mg tablet  Commonly known as: ULTRAM  Take 1 tablet (50 mg total) by mouth every 6 (six) hours as needed for Pain.           * This list has 2 medication(s) that are the same as other medications prescribed for you. Read the directions carefully, and ask your doctor or other care provider to review them with you.                Discharge Procedure Orders   Discharge diet   Order Comments: Eat a bland diet for the first day after surgery. Progress your diet as tolerated. Constipation may occur with Narcotic usage, contact our office if you are experiencing constipation.     Notify physician   Order Comments: Call the doctor's office immediately if you experience any of the following:  - Excessive bleeding or pus like drainage at the incision site  - Uncontrollable pain not relieved by pain medication  - Excessive swelling or redness at the incision site  - Fever above 101.5 degrees not controlled with Tylenol or Motrin  - Shortness of Breath  - Any foul odor or blistering from the surgery site  - Persistent nausea and vomiting or diarrhea  - Difficulty breathing or increased cough  - Severe persistent headache  - Persistent dizziness, light-headedness, or visual disturbances  - Increased confusion or weakness     Change dressing - Aquacel   Order Comments: Keep in place until clinic visit     Discharge instructions - Pain Management Information   Order Comments: Pain Management: A cold therapy cuff, pain medications, local injections, and in some cases, regional  anesthesia injections are used to manage your post-operative pain. The decision to use each of these options is based on their risks and benefits.  Medications: You were given one or more of the following medication prescriptions before leaving the hospital. Have the prescriptions filled at a pharmacy on your way home and follow the instructions on the bottles. If you need a refill, please call your pharmacy.   Narcotic Medication (usually Vicodin ES, Lortab, Percocet or Nucynta): Begin taking the medication before your shoulder starts to hurt. Some patients do not like to take any medication, but if you wait until your pain is severe before taking, you will be very uncomfortable for several hours waiting for the narcotic to work. Always take with food.  Nausea / Vomiting: For this issue, we prescribe Phenergan, use this medication as directed.  Cold Therapy: You may have been sent home with a [employer] [facility]® cold therapy unit and wrap for your shoulder. Fill with ice and water to the indicated fill line and use throughout the day for the first two days and then as needed to help relieve pain and control swelling.   Regional Anesthesia Injections (Blocks): You may have been given a regional nerve block either before or after surgery. This may make your entire shoulder numb for 24-36 hours.     Showering - Aquacel   Order Comments: You may shower after the nerve block has been removed. Reinforce the Aquacel bandage with saran wrap for added protection     1:  Activity   Order Comments: Exercises to be performed 5 times per day for 5 minutes each time; You may have been sent home with a sling / pillow attachment holding your arm away from your body. You may remove the sling when changing clothes or bathing. Make sure to wear the sling while sleeping unless instructed otherwise. You may remove at rest or for exercises; no abduction, no external rotation, non weight bearing, and no active ROM on shoulder.   Exercises:    Pendulums: Bend forward allowing your arm to hang down in front of you. Gently swing your arm side-to-side and front to back  Elbow Motion: Straighten and bend your elbow.  Ball Squeezes: Use ball attached to sling/pillow or soft (nerf) ball for  strengthening  Shoulder Shrugs: Shrug your shoulders up and down.  Shoulder Retractions: (Squeeze shoulder blades together): Squeeze shoulder blades together while slightly pulling them down (do not shrug your shoulders upward); You can perform 10-15 reps, several times throughout the day, when seated at your desk, driving in the car, etc.

## 2025-03-19 NOTE — DISCHARGE SUMMARY
[facility] - Surgery (Hospital)  Discharge Note  Short Stay    Procedure(s) (LRB):  ARTHROPLASTY, SHOULDER, TOTAL,COMPLEX REVERSE, WITH WEDGE AUGENTATION (Right)  TENODESIS, BICEPS, OPEN (Right)      OUTCOME: Patient tolerated treatment/procedure well without complication and is now ready for discharge.    DISPOSITION: Home or Self Care    FINAL DIAGNOSIS:  <principal problem not specified>    FOLLOWUP: In clinic    DISCHARGE INSTRUCTIONS:  No discharge procedures on file.     TIME SPENT ON DISCHARGE: 15 minutes

## 2025-03-19 NOTE — ANESTHESIA PROCEDURE NOTES
Peripheral Block    Patient location during procedure: pre-op   Block not for primary anesthetic.  Reason for block: at surgeon's request and post-op pain management   Post-op Pain Location: right shoulder   Start time: 3/19/2025 6:31 AM  Timeout: 3/19/2025 6:31 AM   End time: 3/19/2025 6:35 AM    Staffing  Authorizing Provider: Basilio Valencia MD  Performing Provider: Basilio Valencia MD    Staffing  Performed by: Basilio Valencia MD  Authorized by: Basilio Valencia MD    Peripheral Block  Patient position: left lateral decubitus  Prep: ChloraPrep  Patient monitoring: heart rate, cardiac monitor, continuous pulse ox, continuous capnometry and frequent blood pressure checks  Block type: Other (PECs I/II)  Laterality: right  Injection technique: single shot  Needle  Needle type: Echogenic   Needle gauge: 20 G  Needle length: 4 in  Needle localization: ultrasound guidance   -ultrasound image captured on disc.  Assessment  Injection assessment: negative aspiration, local visualized surrounding nerve and negative parasthesia  Paresthesia pain: none  Heart rate change: no  Slow fractionated injection: yes  Pain Tolerance: comfortable throughout block and no complaints  Medications:    Medications: bupivacaine (pf) (MARCAINE) injection 0.25% - Perineural   30 mL - 3/19/2025 6:34:00 AM

## 2025-03-19 NOTE — DISCHARGE INSTRUCTIONS
[address], [id number]                                    [phone]             Postoperative Instructions for Shoulder Surgery               Your Surgery Included:   Open              Arthroscopic [] Instability Repair     [] Diagnostic   [] Rotator Cuff Repair     [] Lysis of Adhesions / Manipulation [] Distal Clavicle Resection    [] Debridement [] Biceps Tenodesis       [] Labrum  [] Rotator Cuff   [] Cartilage   [] Contracture Release    [] SLAP Repair   [] Fracture Fixation    [] Instability Repair  [] AC Joint Reconstruction      [] Rotator Cuff Repair [x] Joint Replacement     [] Subacromial Decompression / Bursectomy   [] Hemiarthroplasty  [] Total Shoulder    [] Biceps Tenotomy / Tenodesis    [x] Reverse Total Shoulder       [] Distal Clavicle Resection          [] Amniox Arthrocentesis    [] Contracture Release                 Call our office ([phone]) immediately if you experience any of the following:      Excessive bleeding or pus like drainage at the incision site      Uncontrollable pain not relieved by pain medication      Excessive swelling or redness at the incision site      Fever above 101.5 degrees not controlled with Tylenol or Motrin      Shortness of Breath      Any foul odor or blistering from the surgery site   FOR EMERGENCIES: If any unusual problems or difficulties occur, call our office at [phone], or page the  at [phone] who will direct your call appropriately   1.   Pain Management: A cold therapy cuff, pain medications, local injections, and in some cases, regional anesthesia injections are used to manage your post-operative pain. The decision to use each of these options is based on their risks and benefits.    Medications: You were given one or more of the following medication prescriptions before leaving the hospital. Have the prescriptions filled at a pharmacy on your way home and follow the instructions on the bottles.  If you need a refill, please call your pharmacy.     Narcotic Medication (usually Vicodin ES, Lortab, Percocet or Nucynta): Begin taking the medication before your shoulder starts to hurt. Some patients do not like to take any medication, but if you wait until your pain is severe before taking, you will be very uncomfortable for several hours waiting for the narcotic to work. Always take with food.    Nausea / Vomiting: For this issue, we prescribe Phenergan, use this medication as directed.    Cold Therapy: You may have been sent home with a [employer] cold therapy unit and wrap for your shoulder. Fill with ice and water to the indicated fill line and use throughout the day for the first two days and then as needed to help relieve pain and control swelling.     Regional Anesthesia Injections (Blocks): You may have been given a regional nerve block either before or after surgery. This may make your entire shoulder numb for 24-36 hours.                    2.   Diet: Eat a bland diet for the first day after surgery. Progress your diet as tolerated. Constipation may occur with Narcotic usage, contact our office if you are experiencing constipation.   3.   Activity: After you arrive at home, spend most of the first 24 hours resting in bed, on the couch, or in a reclining chair. After the first 24 hours at home, slowly increase your activity level based on your symptoms.   4.   Dressing Change: Remove the dressing on the 3rd day. It is normal for some blood to be seen on the dressings. It is also normal for you to see apparent bruising on the skin around your shoulder when you remove the dressing. If present, leave the steri-strip tape across the incisions. If you are concerned by the drainage or the appearance of your shoulder, please call one of the numbers listed below.   5.   Showering: You may shower on the 3rd day after surgery with waterproof bandages over small incisions. If you have an incision with Prineo  (clear mesh), it does not need to be covered. Do not submerge in any water until after your postoperative appointment in clinic.   6.   Shoulder Sling (with/without Pillow attachment): You may have been sent home with a sling / pillow attachment holding your arm away from your body. You may remove the sling when changing clothes or bathing. Make sure to wear the sling while sleeping unless instructed otherwise. You may remove at rest or for exercises.           [x] You need to wear the sling without pillow for 24 hours a day for 6 weeks.   7.  Shoulder Exercises: Begin these exercises the first day after surgery in order to help you regain your motion and strength. You may do the following marked exercises for 2-5 mins five times a day:   [x] Shoulder shrugs - Shrug your shoulders up and down.   [x] Pendulums - Bend forward allowing your arm to hang down in front of you. Gently swing your arm side-to-side and front to back.                                                                                                                               [] Passive abduction - Have a family member gently lift your arm away from your body bringing your elbow up to the level of your shoulder.                                                                                                                   [] Shoulder rotation - With your arm at your side, have a family member gently rotate your arm internally and externally.                                                                                                                  [x] Scapular retractions - (Squeeze shoulder blades together): Squeeze shoulder blades together while slightly pulling them down (do not shrug your shoulders upward); You can perform 10-15 reps, several times throughout the day, when seated at your desk, driving in the car, etc.                                                                                                                      [] Pulley exercises - Put a towel or long sleeve shirt over the top of a door. Stand facing the door. Use your good arm to gently pull your operative arm up in front of you.   [x] Elbow motion - Straighten and bend your elbow.                                                                                                               [x] Ball squeezes - use ball attached to sling/pillow or soft (nerf) ball for  strengthening                                                                                                                 8.  Physical Therapy: Physical therapy is an essential component to your recovery from surgery. Your physical therapy will start in 3 days.   FIRST POSTOPERATIVE VISIT: As scheduled.

## 2025-03-20 ENCOUNTER — PATIENT MESSAGE (OUTPATIENT)
Dept: SPORTS MEDICINE | Facility: CLINIC | Age: 70
End: 2025-03-20
Payer: MEDICARE

## 2025-03-20 DIAGNOSIS — M19.011 OSTEOARTHRITIS OF RIGHT GLENOHUMERAL JOINT: ICD-10-CM

## 2025-03-20 RX ORDER — CELECOXIB 200 MG/1
200 CAPSULE ORAL
Qty: 30 CAPSULE | Refills: 1 | Status: SHIPPED | OUTPATIENT
Start: 2025-03-20

## 2025-04-02 ENCOUNTER — CLINICAL SUPPORT (OUTPATIENT)
Dept: REHABILITATION | Facility: HOSPITAL | Age: 70
End: 2025-04-02
Attending: ORTHOPAEDIC SURGERY
Payer: MEDICARE

## 2025-04-02 ENCOUNTER — HOSPITAL ENCOUNTER (OUTPATIENT)
Dept: RADIOLOGY | Facility: HOSPITAL | Age: 70
Discharge: HOME | End: 2025-04-02
Attending: PHYSICIAN ASSISTANT
Payer: MEDICARE

## 2025-04-02 ENCOUNTER — OFFICE VISIT (OUTPATIENT)
Dept: SPORTS MEDICINE | Facility: CLINIC | Age: 70
End: 2025-04-02
Payer: MEDICARE

## 2025-04-02 VITALS — WEIGHT: 167.13 LBS | HEIGHT: 64 IN | BODY MASS INDEX: 28.53 KG/M2

## 2025-04-02 DIAGNOSIS — M25.511 RIGHT SHOULDER PAIN, UNSPECIFIED CHRONICITY: ICD-10-CM

## 2025-04-02 DIAGNOSIS — Z96.611 S/P REVERSE TOTAL SHOULDER ARTHROPLASTY, RIGHT: Primary | ICD-10-CM

## 2025-04-02 DIAGNOSIS — G89.29 CHRONIC RIGHT SHOULDER PAIN: ICD-10-CM

## 2025-04-02 DIAGNOSIS — Z09 SURGERY FOLLOW-UP EXAMINATION: ICD-10-CM

## 2025-04-02 DIAGNOSIS — M25.611 DECREASED RANGE OF MOTION OF RIGHT SHOULDER: Primary | ICD-10-CM

## 2025-04-02 DIAGNOSIS — R53.1 WEAKNESS: ICD-10-CM

## 2025-04-02 DIAGNOSIS — M19.011 OSTEOARTHRITIS OF RIGHT GLENOHUMERAL JOINT: ICD-10-CM

## 2025-04-02 DIAGNOSIS — M25.511 CHRONIC RIGHT SHOULDER PAIN: ICD-10-CM

## 2025-04-02 PROCEDURE — 4010F ACE/ARB THERAPY RXD/TAKEN: CPT | Mod: CPTII,S$GLB,, | Performed by: PHYSICIAN ASSISTANT

## 2025-04-02 PROCEDURE — 73030 X-RAY EXAM OF SHOULDER: CPT | Mod: 26,RT,, | Performed by: RADIOLOGY

## 2025-04-02 PROCEDURE — 97161 PT EVAL LOW COMPLEX 20 MIN: CPT

## 2025-04-02 PROCEDURE — 1125F AMNT PAIN NOTED PAIN PRSNT: CPT | Mod: CPTII,S$GLB,, | Performed by: PHYSICIAN ASSISTANT

## 2025-04-02 PROCEDURE — 3288F FALL RISK ASSESSMENT DOCD: CPT | Mod: CPTII,S$GLB,, | Performed by: PHYSICIAN ASSISTANT

## 2025-04-02 PROCEDURE — 99999 PR PBB SHADOW E&M-EST. PATIENT-LVL IV: CPT | Mod: PBBFAC,,, | Performed by: PHYSICIAN ASSISTANT

## 2025-04-02 PROCEDURE — 99024 POSTOP FOLLOW-UP VISIT: CPT | Mod: S$GLB,,, | Performed by: PHYSICIAN ASSISTANT

## 2025-04-02 PROCEDURE — 3044F HG A1C LEVEL LT 7.0%: CPT | Mod: CPTII,S$GLB,, | Performed by: PHYSICIAN ASSISTANT

## 2025-04-02 PROCEDURE — 73030 X-RAY EXAM OF SHOULDER: CPT | Mod: TC,RT

## 2025-04-02 PROCEDURE — 1160F RVW MEDS BY RX/DR IN RCRD: CPT | Mod: CPTII,S$GLB,, | Performed by: PHYSICIAN ASSISTANT

## 2025-04-02 PROCEDURE — 1159F MED LIST DOCD IN RCRD: CPT | Mod: CPTII,S$GLB,, | Performed by: PHYSICIAN ASSISTANT

## 2025-04-02 PROCEDURE — 1101F PT FALLS ASSESS-DOCD LE1/YR: CPT | Mod: CPTII,S$GLB,, | Performed by: PHYSICIAN ASSISTANT

## 2025-04-02 NOTE — PROGRESS NOTES
"  Outpatient Rehab    Physical Therapy Evaluation    Patient Name: Rebecca Johnston Zollinger  MRN: [id number]  YOB: 1955  Encounter Date: 4/2/2025    Therapy Diagnosis:   Encounter Diagnoses   Name Primary?    Osteoarthritis of right glenohumeral joint     Decreased range of motion of right shoulder Yes    Chronic right shoulder pain     Weakness      Physician: Emanuel Aguilar MD    Physician Orders: Eval and Treat  Medical Diagnosis: Osteoarthritis of right glenohumeral joint    Visit # / Visits Authorized:  1 / 1  Insurance Authorization Period: 1/25/2025 to 1/25/2026  Date of Evaluation: 4/2/2025  Plan of Care Certification: 4/2/2025 to 7/2/2025     Time In:     Time Out:    Total Time:     Total Billable Time:      Intake Outcome Measure for FOTO Survey    Therapist reviewed FOTO scores for Rebecca Johnston Zollinger on 4/2/2025.   FOTO report - see Media section or FOTO account episode details.     Intake Score:  %         Subjective       Date of onset: Reverse TSA 3/19/2025    History of current condition - Pt reports: She has pain when moving the arm. When it does come on it is the biceps. She did previously have a RCR with biceps tenodesis. Elbow hurts when trying to straighten it. She denies N/T or mechanical symptoms. She has been having most ot the pain on the inside of the scapula and neck and on top the shoulder. She describes the pain as tightness and aching. She has an inversion table that she is looking forward to getting on but was told she can't yet. She only has to take the pain meds at night.     Falls: none since surgery    Imaging: X-ray: Per EMR "FINDINGS:  Reconfirmed findings of reversed right total shoulder arthroplasty procedure, stable and satisfactory alignment and position of hardware.  No acute fractures.  Preserved acromioclavicular articulation.  No findings of calcific tendinitis."    Prior Therapy: yes for neck and after RCR  Social History: lives in [facility] with " THE - handrail on L going up  Occupation: not working  Prior Level of Function: independent  Current Level of Function: Mod I with dressing, assistance needed for tieing shoes    Pain:  Current 4/10, worst 10/10, best 0/10   Location: right shoulder   Description: Aching  Aggravating Factors: movement  Easing Factors: ice and rest    Patients goals: to get back to playing pickle-ball;  to be able to stain glass (cutting, saudering and taping)      Past Medical History/Physical Systems Review:   Rebecca Johnston Zollinger  has a past medical history of Anticoagulant long-term use and Hypertension.    Rebecca Johnston Zollinger  has a past surgical history that includes Transforaminal epidural injection of steroid (Right, 8/14/2023); Transforaminal epidural injection of steroid (Right, 12/18/2023); Transforaminal epidural injection of steroid (Right, 8/29/2024); Epidural steroid injection (N/A, 10/10/2024); Transforaminal epidural injection of steroid (Right, 11/4/2024); Transforaminal epidural injection of steroid (Left, 2/17/2025); Reverse total shoulder arthroplasty (Right, 3/19/2025); and tenodesis, biceps, open (Right, 3/19/2025).    Xochitl has a current medication list which includes the following prescription(s): alendronate, aspirin, aspirin, bupropion, celecoxib, cyclosporine, estradiol, fluoxetine, hydrocodone-acetaminophen 7.5-325 mg/15 ml, ipratropium, methocarbamol, nitrofurantoin (macrocrystal-monohydrate), oxycodone-acetaminophen, pantoprazole, phenazopyridine, prazosin, promethazine, rosuvastatin, tizanidine, and tramadol.    Review of patient's allergies indicates:   Allergen Reactions    I.n.h. Hives    Protease-amylase equip  Hives     Tide detergent        Objective      Subcranial Range of Motion   Active Restricted? Passive Restricted? Pain   Flexion         Protraction         Retraction           Cervical Range of Motion   Active (deg) Passive (deg) Pain   Flexion         Extension          Right Lateral Flexion  (limited)    (contralateral stretech)   Right Rotation         Left Lateral Flexion  (limited)    (contralateral stretech)   Left Rotation                Shoulder Range of Motion  Right Shoulder   Active (deg) Passive (deg) Pain   Flexion   90     Extension         Scaption         ABduction   90     ADduction         Horizontal ABduction         Horizontal ADduction         External Rotation (Shoulder ABducted 0 degrees)         External Rotation (Shoulder ABducted 45 degrees)   20  (measured at 30* abd))   External Rotation (Shoulder ABducted 90 degrees)         Internal Rotation (Shoulder ABducted 0 degrees)         Internal Rotation (Shoulder ABducted 45 degrees)         Internal Rotation (Shoulder ABducted 90 degrees)             Shoulder, Elbow, or Forearm Range of Motion Details: WNL    Elbow/Forearm Range of Motion   Right Elbow/Forearm   Active (deg) Passive (deg) Pain   Flexion  (WNL)       Extension         Forearm Pronation         Forearm Supination                        Treatment:    Review of HEP including the following: elbow flex/ext; pendulums fwd and lat; scap squeezes, shrugs, wrist flex/ext, ut stretch, ls stretch    Next session: PROM, chin tucks, finger web flex/ext/abd    Time Entry(in minutes):       Assessment & Plan   Assessment  Xochitl presents with a condition of Low complexity.   Presentation of Symptoms: Stable  Will Comorbidities Impact Care: Yes  See history above    Functional Limitations: Activity tolerance, Carrying objects, Disrupted sleep pattern, Pain when reaching, Pain with ADLs/IADLs, Participating in sports, Participating in leisure activities, Range of motion, Completing self-care activities, Driving, Manipulating objects, Reaching  Impairments: Abnormal or restricted range of motion, Impaired physical strength, Pain with functional activity  Personal Factors Affecting Prognosis: Pain, Transportation    Prognosis: Excellent  Assessment Details:  Pt presents with impairments consistent with her status of 2 weeks post op from R reverse TSA. She has PROM within protocol but deferred AROM of shoulder per protocol. Deferred MMT d/t recent surgery. She is significantly limited in shoulder ROM, UE strength, posture, activity tolerance, and  strength. Her impairments are significantly effecting her daily life. She would benefit from skilled PT services to address impairments and to progress safely per protocol.     Plan  From a physical therapy perspective, the patient would benefit from: Skilled Rehab Services    Planned therapy interventions include: Therapeutic activities, Therapeutic exercise, Neuromuscular re-education, and Manual therapy.    Planned modalities to include: Cryotherapy (cold pack), Electrical stimulation - passive/unattended, Mechanical traction, Thermotherapy (hot pack), and Ultrasound.        Visit Frequency: 2 times Per Week for 12 Weeks.       This plan was discussed with Patient.   Discussion participants: Agreed Upon Plan of Care             Patient's spiritual, cultural, and educational needs considered and patient agreeable to plan of care and goals.           Goals:   Active       LTG       Pt will improve FOTO score to </=62% limited to decrease perceived limitation with carrying, moving, and handling objects.       Start:  04/02/25    Expected End:  07/02/25            Pt will increase R shoulder AROM to WFL with in protocol in all planes to improve functional use of R RUE.       Start:  04/02/25    Expected End:  07/02/25            Pt will improve R shoulder MMTs by 1 grade to promote equal use of B UEs in performing functional tasks.       Start:  04/02/25    Expected End:  07/02/25            Pt will lift 20 lb objects without pain to promote functional QOL.        Start:  04/02/25    Expected End:  07/02/25            Pt to report pain </= 2/10 with ADLs and IADLs using R UE to improve functional QOL.        Start:  04/02/25     Expected End:  07/02/25               STG       Pt will be independent with HEP to supplement PT in improving functional use of R UE.        Start:  04/02/25    Expected End:  05/02/25            Pt will increase pain free R shoulder elevation AROM to >/= 90 deg to improve  functional mobility of UE       Start:  04/02/25    Expected End:  05/02/25            Pt will increase shoulder ER PROM in 30 deg abduction to >/=45 deg to improve functional mobility of UE       Start:  04/02/25    Expected End:  05/02/25            Pt will improve R shoulder MMTs to 3+/5 to promote equal use of B UEs in performing functional tasks.       Start:  04/02/25    Expected End:  05/02/25                Guru Milian, PT, DPT

## 2025-04-02 NOTE — PROGRESS NOTES
"CC: Right shoulder post op 2 weeks    Patient is here for her 2 week post op appointment s/p below and is doing well. Patient is scheduled for her initial physical therapy evaluation today at [facility]. Patient is taking pain medication 1-2 times/day. she denies any chest pain, SOB, fevers, chills, nausea, vomiting, or drainage from incision sites.     DATE OF SURGERY:  3/19/2025      PREOPERATIVE DIAGNOSIS:   1. Right shoulder rotator cuff arthropathy with glenoid bone loss.   2. Right shoulder biceps tendinopathy     POSTOPERATIVE DIAGNOSIS:   1. Right shoulder rotator cuff arthropathy with glenoid bone loss.   2. Right shoulder biceps tendinopathy     PROCEDURE:   1. Right reverse total shoulder arthroplasty (complex with glenoid wedge augmentation)  2. Right shoulder biceps tenodesis     SURGEON: Emanuel Aguilar M.D.     Pain well tolerated on pain medication  Sling in place  No issues reported    Review of Systems   Constitution: Negative. Negative for chills, fever and night sweats.    Cardiovascular: Negative for chest pain and syncope.   Respiratory: Negative for cough and shortness of breath.    Gastrointestinal: Negative for abdominal pain and bowel incontinence.      PE:    Ht 5' 4" (1.626 m)   Wt 75.8 kg (167 lb 1.7 oz)   BMI 28.68 kg/m²      Right shoulder    Incision healed  No sign of infection  Swelling resolved  Compartments soft  Neurovascular status intact in extremity    PROM  Forward elevation 90 degrees  External rotation 15 degrees    X-rays right shoulder (04/02/2025):  Status post reverse total shoulder arthroplasty.  Prosthetics appear in good position without any evidence of perihardware fracture/complication.  Soft tissues appear normal.    Assessment:  2 weeks s/p right reverse total shoulder arthroplasty and biceps tenodesis    Plan:  1. Continue PT   2. Pain medication as needed for PT; try to wean off for next visit  3. Return to clinic in 4 weeks for 6 week " post op follow up      All questions were answered. Instructed patient to call with questions or concerns in the interim.       Medical Dictation software was used during the dictation of portions or the entirety of this medical record.  Phonetic or grammatic errors may exist due to the use of this software. For clarification, refer to the author of the document.

## 2025-04-07 ENCOUNTER — CLINICAL SUPPORT (OUTPATIENT)
Dept: REHABILITATION | Facility: HOSPITAL | Age: 70
End: 2025-04-07
Payer: MEDICARE

## 2025-04-07 DIAGNOSIS — R53.1 WEAKNESS: ICD-10-CM

## 2025-04-07 DIAGNOSIS — M19.011 OSTEOARTHRITIS OF RIGHT GLENOHUMERAL JOINT: Primary | ICD-10-CM

## 2025-04-07 DIAGNOSIS — M25.611 DECREASED RANGE OF MOTION OF RIGHT SHOULDER: ICD-10-CM

## 2025-04-07 DIAGNOSIS — M25.511 CHRONIC RIGHT SHOULDER PAIN: ICD-10-CM

## 2025-04-07 DIAGNOSIS — G89.29 CHRONIC RIGHT SHOULDER PAIN: ICD-10-CM

## 2025-04-07 PROCEDURE — 97110 THERAPEUTIC EXERCISES: CPT | Mod: CQ

## 2025-04-07 PROCEDURE — 97140 MANUAL THERAPY 1/> REGIONS: CPT | Mod: CQ

## 2025-04-07 NOTE — PROGRESS NOTES
"  Outpatient Rehab    Physical Therapy Visit    Patient Name: Rebecca Johnston Zollinger  MRN: [id number]  YOB: 1955  Encounter Date: 4/7/2025    Therapy Diagnosis:   Encounter Diagnoses   Name Primary?    Osteoarthritis of right glenohumeral joint Yes    Chronic right shoulder pain     Weakness     Decreased range of motion of right shoulder      Physician: Emanuel Aguilar MD    Physician Orders: Eval and Treat  Medical Diagnosis: Osteoarthritis of right glenohumeral joint    Visit # / Visits Authorized:  1 / 10  Insurance Authorization Period: 1/1/2025 to 12/31/2025  Date of Evaluation: 4/2/2025  Plan of Care Certification: 4/2/2025 to 7/2/2025      PT/PTA: PTA   Number of PTA visits since last PT visit:1  Time In: 1354   Time Out: 1432  Total Time: 38   Total Billable Time: 38    FOTO:  Intake Score:  %  Survey Score 1:  %  Survey Score 2:  %    Precautions     Reverse Shoulder Protocol      Subjective   Pt reports that she does no have any pain in her shoulder at rest, however does have pain when moving her shoulder (such as too far with the pendulums)..         Objective            Treatment:      THERAPEUTIC EXERCISES to develop strength, endurance, ROM, and flexibility, including:   (R) finger web flex/ext/abd 3x10   (R) Wrist flex/ext, ulnar/radial dev, sup/pro 3x10   (R) Elbow flex/ext 3x10  UT/LS stretches 5x15" B   chin tucks 3" 3x10       MANUAL THERAPY TECHNIQUES were applied to Right shoulder :   (R) shld PROM    NEUROMUSCULAR RE-EDUCATION ACTIVITIES to improve Coordination, Proprioception, Posture, and Motor Control.  The following were included:          THERAPEUTIC ACTIVITIES to improve dynamic and functional performance, including:       GAIT TRAINING to improve functional mobility and safety. The following were included:           Time Entry(in minutes):  Manual Therapy Time Entry: 15  Therapeutic Exercise Time Entry: 23    Assessment & Plan   Assessment: Pt presented to clinic " "with sling in place, and is now 2 weeks post op. Continued with activities within this phase of procotol with pt noting slight "cramping" along inferior angle of right scapula. Issued additional activities for HEP. Pt exhibited no limitations in ROM for current phase of protocol.  Evaluation/Treatment Tolerance: Patient tolerated treatment well    Patient will continue to benefit from skilled outpatient physical therapy to address the deficits listed in the problem list box on initial evaluation, provide pt/family education and to maximize pt's level of independence in the home and community environment.     Patient's spiritual, cultural, and educational needs considered and patient agreeable to plan of care and goals.           Plan: progress per protocol.    Goals:   Active       LTG       Pt will improve FOTO score to </=62% limited to decrease perceived limitation with carrying, moving, and handling objects. (Progressing)       Start:  04/02/25    Expected End:  07/02/25            Pt will increase R shoulder AROM to WFL with in protocol in all planes to improve functional use of R RUE. (Progressing)       Start:  04/02/25    Expected End:  07/02/25            Pt will improve R shoulder MMTs by 1 grade to promote equal use of B UEs in performing functional tasks. (Progressing)       Start:  04/02/25    Expected End:  07/02/25            Pt will lift 20 lb objects without pain to promote functional QOL.  (Progressing)       Start:  04/02/25    Expected End:  07/02/25            Pt to report pain </= 2/10 with ADLs and IADLs using R UE to improve functional QOL.  (Progressing)       Start:  04/02/25    Expected End:  07/02/25               STG       Pt will be independent with HEP to supplement PT in improving functional use of R UE.  (Progressing)       Start:  04/02/25    Expected End:  05/02/25            Pt will increase pain free R shoulder elevation AROM to >/= 90 deg to improve  functional mobility of UE " (Progressing)       Start:  04/02/25    Expected End:  05/02/25            Pt will increase shoulder ER PROM in 30 deg abduction to >/=45 deg to improve functional mobility of UE (Progressing)       Start:  04/02/25    Expected End:  05/02/25            Pt will improve R shoulder MMTs to 3+/5 to promote equal use of B UEs in performing functional tasks. (Progressing)       Start:  04/02/25    Expected End:  05/02/25                Danette Harper, PTA

## 2025-04-14 ENCOUNTER — CLINICAL SUPPORT (OUTPATIENT)
Dept: REHABILITATION | Facility: HOSPITAL | Age: 70
End: 2025-04-14
Payer: MEDICARE

## 2025-04-14 DIAGNOSIS — R53.1 WEAKNESS: ICD-10-CM

## 2025-04-14 DIAGNOSIS — G89.29 CHRONIC RIGHT SHOULDER PAIN: ICD-10-CM

## 2025-04-14 DIAGNOSIS — M19.011 OSTEOARTHRITIS OF RIGHT GLENOHUMERAL JOINT: Primary | ICD-10-CM

## 2025-04-14 DIAGNOSIS — M25.511 CHRONIC RIGHT SHOULDER PAIN: ICD-10-CM

## 2025-04-14 DIAGNOSIS — M25.611 DECREASED RANGE OF MOTION OF RIGHT SHOULDER: ICD-10-CM

## 2025-04-14 PROCEDURE — 97140 MANUAL THERAPY 1/> REGIONS: CPT | Mod: CQ

## 2025-04-14 NOTE — PROGRESS NOTES
"  Outpatient Rehab    Physical Therapy Visit    Patient Name: Rebecca Johnston Zollinger  MRN: [id number]  YOB: 1955  Encounter Date: 4/14/2025    Therapy Diagnosis:   Encounter Diagnoses   Name Primary?    Osteoarthritis of right glenohumeral joint Yes    Chronic right shoulder pain     Weakness     Decreased range of motion of right shoulder      Physician: Emanuel Aguilar MD    Physician Orders: Eval and Treat  Medical Diagnosis: Osteoarthritis of right glenohumeral joint    Visit # / Visits Authorized:  2 / 10  Insurance Authorization Period: 1/1/2025 to 12/31/2025  Date of Evaluation: 4/2/2025  Plan of Care Certification: 4/2/2025 to 7/2/2025      PT/PTA: PTA   Number of PTA visits since last PT visit:2  Time In: 1405   Time Out: 1439  Total Time: 34   Total Billable Time: 15 (d/t 1:1 billing practices)    FOTO:  Intake Score:  %  Survey Score 1:  %  Survey Score 2:  %         Subjective   Pt reports that her shoulder is feeling fine and has been compliant with her HEP..         Objective            Treatment:      THERAPEUTIC EXERCISES to develop strength, endurance, ROM, and flexibility, including:   (R) finger web flex/ext/abd 3x10   (R) Wrist flex/ext, ulnar/radial dev, sup/pro 3x10   (R) Elbow flex/ext 3x10  UT/LS stretches 5x15" B   chin tucks 3" 3x10   Flexion pendulums 30x       MANUAL THERAPY TECHNIQUES were applied to Right shoulder :   (R) shld PROM per protocol    NEUROMUSCULAR RE-EDUCATION ACTIVITIES to improve Coordination, Proprioception, Posture, and Motor Control.  The following were included:          THERAPEUTIC ACTIVITIES to improve dynamic and functional performance, including:       GAIT TRAINING to improve functional mobility and safety. The following were included:           Time Entry(in minutes):  Manual Therapy Time Entry: 12  Therapeutic Exercise Time Entry: 19    Assessment & Plan   Assessment: Pt continues with good ROM within limits of protocol, with no guarding " or restrictions during PROM.  Evaluation/Treatment Tolerance: Patient tolerated treatment well    Patient will continue to benefit from skilled outpatient physical therapy to address the deficits listed in the problem list box on initial evaluation, provide pt/family education and to maximize pt's level of independence in the home and community environment.     Patient's spiritual, cultural, and educational needs considered and patient agreeable to plan of care and goals.           Plan: progress per protocol.    Goals:   Active       LTG       Pt will improve FOTO score to </=62% limited to decrease perceived limitation with carrying, moving, and handling objects. (Progressing)       Start:  04/02/25    Expected End:  07/02/25            Pt will increase R shoulder AROM to WFL with in protocol in all planes to improve functional use of R RUE. (Progressing)       Start:  04/02/25    Expected End:  07/02/25            Pt will improve R shoulder MMTs by 1 grade to promote equal use of B UEs in performing functional tasks. (Progressing)       Start:  04/02/25    Expected End:  07/02/25            Pt will lift 20 lb objects without pain to promote functional QOL.  (Progressing)       Start:  04/02/25    Expected End:  07/02/25            Pt to report pain </= 2/10 with ADLs and IADLs using R UE to improve functional QOL.  (Progressing)       Start:  04/02/25    Expected End:  07/02/25               STG       Pt will be independent with HEP to supplement PT in improving functional use of R UE.  (Progressing)       Start:  04/02/25    Expected End:  05/02/25            Pt will increase pain free R shoulder elevation AROM to >/= 90 deg to improve  functional mobility of UE (Progressing)       Start:  04/02/25    Expected End:  05/02/25            Pt will increase shoulder ER PROM in 30 deg abduction to >/=45 deg to improve functional mobility of UE (Progressing)       Start:  04/02/25    Expected End:  05/02/25             Pt will improve R shoulder MMTs to 3+/5 to promote equal use of B UEs in performing functional tasks. (Progressing)       Start:  04/02/25    Expected End:  05/02/25                Danette Harper PTA

## 2025-04-21 ENCOUNTER — CLINICAL SUPPORT (OUTPATIENT)
Dept: REHABILITATION | Facility: HOSPITAL | Age: 70
End: 2025-04-21
Payer: MEDICARE

## 2025-04-21 DIAGNOSIS — M25.511 CHRONIC RIGHT SHOULDER PAIN: ICD-10-CM

## 2025-04-21 DIAGNOSIS — G89.29 CHRONIC RIGHT SHOULDER PAIN: ICD-10-CM

## 2025-04-21 DIAGNOSIS — R53.1 WEAKNESS: ICD-10-CM

## 2025-04-21 DIAGNOSIS — M25.611 DECREASED RANGE OF MOTION OF RIGHT SHOULDER: ICD-10-CM

## 2025-04-21 DIAGNOSIS — M19.011 OSTEOARTHRITIS OF RIGHT GLENOHUMERAL JOINT: Primary | ICD-10-CM

## 2025-04-21 PROCEDURE — 97112 NEUROMUSCULAR REEDUCATION: CPT

## 2025-04-21 PROCEDURE — 97140 MANUAL THERAPY 1/> REGIONS: CPT

## 2025-04-21 NOTE — PROGRESS NOTES
"  Outpatient Rehab    Physical Therapy Visit    Patient Name: Rebecca Johnston Zollinger  MRN: [id number]  YOB: 1955  Encounter Date: 4/21/2025    Therapy Diagnosis:   Encounter Diagnoses   Name Primary?    Osteoarthritis of right glenohumeral joint Yes    Chronic right shoulder pain     Weakness     Decreased range of motion of right shoulder        Physician: Emanuel Aguilar MD    Physician Orders: Eval and Treat  Medical Diagnosis: Osteoarthritis of right glenohumeral joint    Visit # / Visits Authorized:  3 / 10  Insurance Authorization Period: 1/1/2025 to 12/31/2025  Date of Evaluation: 4/2/2025  Plan of Care Certification: 4/2/2025 to 7/2/2025      PT/PTA:     Number of PTA visits since last PT visit:   Time In: 1329   Time Out: 1356  Total Time: 27   Total Billable Time: 27    FOTO:  Intake Score:  %  Survey Score 1:  %  Survey Score 2:  %         Subjective   PT states that the shoulder has been feeling really good. She has to leave early today to get her  to her appointment..  Pain reported as 0/10.      Objective        PROM:   - flexion = 120   - ER = 45   - IR = 40    Treatment:      THERAPEUTIC EXERCISES to develop strength, endurance, ROM, and flexibility, including:     Not today:  (R) finger web flex/ext/abd 3x10   (R) Wrist flex/ext, ulnar/radial dev, sup/pro 3x10   (R) Elbow flex/ext 3x10  UT/LS stretches 5x15" B   chin tucks 3" 3x10   Flexion pendulums 30x       MANUAL THERAPY TECHNIQUES were applied to Right shoulder :   (R) shld PROM per protocol      NEUROMUSCULAR RE-EDUCATION ACTIVITIES to improve Coordination, Proprioception, Posture, and Motor Control.  The following were included:  Shoulder iso 2x10 each   - flex   - abd   - IR   - ER        THERAPEUTIC ACTIVITIES to improve dynamic and functional performance, including:       GAIT TRAINING to improve functional mobility and safety. The following were included:           Time Entry(in minutes):  Manual Therapy Time " Entry: 19  Neuromuscular Re-Education Time Entry: 8    Assessment & Plan   Assessment: Pt shows good progressions of PROM within protocol. Initiated deltoid and rotator cuff gentle isometrics today with good response.  Evaluation/Treatment Tolerance: Patient tolerated treatment well    Patient will continue to benefit from skilled outpatient physical therapy to address the deficits listed in the problem list box on initial evaluation, provide pt/family education and to maximize pt's level of independence in the home and community environment.     Patient's spiritual, cultural, and educational needs considered and patient agreeable to plan of care and goals.           Plan: Monitor response and progress as tolerated.    Goals:   Active       LTG       Pt will improve FOTO score to </=62% limited to decrease perceived limitation with carrying, moving, and handling objects. (Progressing)       Start:  04/02/25    Expected End:  07/02/25            Pt will increase R shoulder AROM to WFL with in protocol in all planes to improve functional use of R RUE. (Progressing)       Start:  04/02/25    Expected End:  07/02/25            Pt will improve R shoulder MMTs by 1 grade to promote equal use of B UEs in performing functional tasks. (Progressing)       Start:  04/02/25    Expected End:  07/02/25            Pt will lift 20 lb objects without pain to promote functional QOL.  (Progressing)       Start:  04/02/25    Expected End:  07/02/25            Pt to report pain </= 2/10 with ADLs and IADLs using R UE to improve functional QOL.  (Progressing)       Start:  04/02/25    Expected End:  07/02/25               STG       Pt will be independent with HEP to supplement PT in improving functional use of R UE.  (Progressing)       Start:  04/02/25    Expected End:  05/02/25            Pt will increase pain free R shoulder elevation AROM to >/= 90 deg to improve  functional mobility of UE (Progressing)       Start:  04/02/25     Expected End:  05/02/25            Pt will increase shoulder ER PROM in 30 deg abduction to >/=45 deg to improve functional mobility of UE (Progressing)       Start:  04/02/25    Expected End:  05/02/25            Pt will improve R shoulder MMTs to 3+/5 to promote equal use of B UEs in performing functional tasks. (Progressing)       Start:  04/02/25    Expected End:  05/02/25                Guru Milian, PT, DPT

## 2025-04-28 ENCOUNTER — CLINICAL SUPPORT (OUTPATIENT)
Dept: REHABILITATION | Facility: HOSPITAL | Age: 70
End: 2025-04-28
Payer: MEDICARE

## 2025-04-28 DIAGNOSIS — R53.1 WEAKNESS: ICD-10-CM

## 2025-04-28 DIAGNOSIS — G89.29 CHRONIC RIGHT SHOULDER PAIN: ICD-10-CM

## 2025-04-28 DIAGNOSIS — M25.511 CHRONIC RIGHT SHOULDER PAIN: ICD-10-CM

## 2025-04-28 DIAGNOSIS — M19.011 OSTEOARTHRITIS OF RIGHT GLENOHUMERAL JOINT: Primary | ICD-10-CM

## 2025-04-28 DIAGNOSIS — M25.611 DECREASED RANGE OF MOTION OF RIGHT SHOULDER: ICD-10-CM

## 2025-04-28 PROCEDURE — 97140 MANUAL THERAPY 1/> REGIONS: CPT

## 2025-04-28 PROCEDURE — 97112 NEUROMUSCULAR REEDUCATION: CPT

## 2025-04-28 NOTE — PROGRESS NOTES
"  Outpatient Rehab    Physical Therapy Visit    Patient Name: Rebecca Johnston Zollinger  MRN: [id number]  YOB: 1955  Encounter Date: 4/28/2025    Therapy Diagnosis:   Encounter Diagnoses   Name Primary?    Osteoarthritis of right glenohumeral joint Yes    Chronic right shoulder pain     Weakness     Decreased range of motion of right shoulder          Physician: Emanuel Aguilar MD    Physician Orders: Eval and Treat  Medical Diagnosis: Osteoarthritis of right glenohumeral joint    Visit # / Visits Authorized:  4 / 10  Insurance Authorization Period: 1/1/2025 to 12/31/2025  Date of Evaluation: 4/2/2025  Plan of Care Certification: 4/2/2025 to 7/2/2025      PT/PTA:     Number of PTA visits since last PT visit:   Time In: 1320   Time Out: 1350  Total Time: 30   Total Billable Time: 30    FOTO:  Intake Score:  %  Survey Score 1:  %  Survey Score 2:  %         Subjective   She has been noticing pain in the trap area especially at night. She has been using her arm a little more at home..         Objective        PROM:   - flexion = 120   - abd = 120   - ER = 45   - IR = 40    Treatment:      THERAPEUTIC EXERCISES to develop strength, endurance, ROM, and flexibility, including:     Not today:  (R) finger web flex/ext/abd 3x10   (R) Wrist flex/ext, ulnar/radial dev, sup/pro 3x10   (R) Elbow flex/ext 3x10  UT/LS stretches 5x15" B   chin tucks 3" 3x10   Flexion pendulums 30x       MANUAL THERAPY TECHNIQUES were applied to Right shoulder :   (R) shld PROM per protocol      NEUROMUSCULAR RE-EDUCATION ACTIVITIES to improve Coordination, Proprioception, Posture, and Motor Control.  The following were included:  Shoulder iso 2x10 each   - flex   - ext   - add   - abd   - IR   - ER        THERAPEUTIC ACTIVITIES to improve dynamic and functional performance, including:       GAIT TRAINING to improve functional mobility and safety. The following were included:           Time Entry(in minutes):  Manual Therapy Time " Entry: 20  Neuromuscular Re-Education Time Entry: 10    Assessment & Plan   Assessment: Pt continues with good response to interventions and is progressing well per protocol. Pt returns to MD tomorrow and will progress as tolerated per protocol pending MD's visit.  Evaluation/Treatment Tolerance: Patient tolerated treatment well    Patient will continue to benefit from skilled outpatient physical therapy to address the deficits listed in the problem list box on initial evaluation, provide pt/family education and to maximize pt's level of independence in the home and community environment.     Patient's spiritual, cultural, and educational needs considered and patient agreeable to plan of care and goals.           Plan: Monitor response and progress as tolerated.    Goals:   Active       LTG       Pt will improve FOTO score to </=62% limited to decrease perceived limitation with carrying, moving, and handling objects. (Progressing)       Start:  04/02/25    Expected End:  07/02/25            Pt will increase R shoulder AROM to WFL with in protocol in all planes to improve functional use of R RUE. (Progressing)       Start:  04/02/25    Expected End:  07/02/25            Pt will improve R shoulder MMTs by 1 grade to promote equal use of B UEs in performing functional tasks. (Progressing)       Start:  04/02/25    Expected End:  07/02/25            Pt will lift 20 lb objects without pain to promote functional QOL.  (Progressing)       Start:  04/02/25    Expected End:  07/02/25            Pt to report pain </= 2/10 with ADLs and IADLs using R UE to improve functional QOL.  (Progressing)       Start:  04/02/25    Expected End:  07/02/25               STG       Pt will be independent with HEP to supplement PT in improving functional use of R UE.  (Progressing)       Start:  04/02/25    Expected End:  05/02/25            Pt will increase pain free R shoulder elevation AROM to >/= 90 deg to improve  functional mobility of  UE (Progressing)       Start:  04/02/25    Expected End:  05/02/25            Pt will increase shoulder ER PROM in 30 deg abduction to >/=45 deg to improve functional mobility of UE (Progressing)       Start:  04/02/25    Expected End:  05/02/25            Pt will improve R shoulder MMTs to 3+/5 to promote equal use of B UEs in performing functional tasks. (Progressing)       Start:  04/02/25    Expected End:  05/02/25                Guru Milian, PT, DPT

## 2025-04-29 ENCOUNTER — HOSPITAL ENCOUNTER (OUTPATIENT)
Dept: RADIOLOGY | Facility: HOSPITAL | Age: 70
Discharge: HOME | End: 2025-04-29
Attending: ORTHOPAEDIC SURGERY
Payer: MEDICARE

## 2025-04-29 ENCOUNTER — OFFICE VISIT (OUTPATIENT)
Dept: SPORTS MEDICINE | Facility: CLINIC | Age: 70
End: 2025-04-29
Payer: MEDICARE

## 2025-04-29 VITALS
HEIGHT: 64 IN | BODY MASS INDEX: 27.85 KG/M2 | DIASTOLIC BLOOD PRESSURE: 83 MMHG | HEART RATE: 64 BPM | WEIGHT: 163.13 LBS | SYSTOLIC BLOOD PRESSURE: 136 MMHG

## 2025-04-29 DIAGNOSIS — Z96.611 S/P REVERSE TOTAL SHOULDER ARTHROPLASTY, RIGHT: ICD-10-CM

## 2025-04-29 DIAGNOSIS — Z96.611 S/P REVERSE TOTAL SHOULDER ARTHROPLASTY, RIGHT: Primary | ICD-10-CM

## 2025-04-29 PROCEDURE — 73030 X-RAY EXAM OF SHOULDER: CPT | Mod: 26,RT,, | Performed by: RADIOLOGY

## 2025-04-29 PROCEDURE — 99024 POSTOP FOLLOW-UP VISIT: CPT | Mod: S$GLB,,, | Performed by: ORTHOPAEDIC SURGERY

## 2025-04-29 PROCEDURE — 3044F HG A1C LEVEL LT 7.0%: CPT | Mod: CPTII,S$GLB,, | Performed by: ORTHOPAEDIC SURGERY

## 2025-04-29 PROCEDURE — 3075F SYST BP GE 130 - 139MM HG: CPT | Mod: CPTII,S$GLB,, | Performed by: ORTHOPAEDIC SURGERY

## 2025-04-29 PROCEDURE — 73030 X-RAY EXAM OF SHOULDER: CPT | Mod: TC,RT

## 2025-04-29 PROCEDURE — 4010F ACE/ARB THERAPY RXD/TAKEN: CPT | Mod: CPTII,S$GLB,, | Performed by: ORTHOPAEDIC SURGERY

## 2025-04-29 PROCEDURE — 1126F AMNT PAIN NOTED NONE PRSNT: CPT | Mod: CPTII,S$GLB,, | Performed by: ORTHOPAEDIC SURGERY

## 2025-04-29 PROCEDURE — 3288F FALL RISK ASSESSMENT DOCD: CPT | Mod: CPTII,S$GLB,, | Performed by: ORTHOPAEDIC SURGERY

## 2025-04-29 PROCEDURE — 1101F PT FALLS ASSESS-DOCD LE1/YR: CPT | Mod: CPTII,S$GLB,, | Performed by: ORTHOPAEDIC SURGERY

## 2025-04-29 PROCEDURE — 99999 PR PBB SHADOW E&M-EST. PATIENT-LVL IV: CPT | Mod: PBBFAC,,, | Performed by: ORTHOPAEDIC SURGERY

## 2025-04-29 PROCEDURE — 1159F MED LIST DOCD IN RCRD: CPT | Mod: CPTII,S$GLB,, | Performed by: ORTHOPAEDIC SURGERY

## 2025-04-29 PROCEDURE — 3079F DIAST BP 80-89 MM HG: CPT | Mod: CPTII,S$GLB,, | Performed by: ORTHOPAEDIC SURGERY

## 2025-04-29 NOTE — PROGRESS NOTES
"CC: Right shoulder post op 6 weeks    Patient is here for her 2 week post op appointment s/p below and is doing well. Patient is scheduled for her initial physical therapy evaluation today at [facility]. Patient is taking pain medication 1-2 times/day. she denies any chest pain, SOB, fevers, chills, nausea, vomiting, or drainage from incision sites.     DATE OF SURGERY:  3/19/2025      PREOPERATIVE DIAGNOSIS:   1. Right shoulder rotator cuff arthropathy with glenoid bone loss.   2. Right shoulder biceps tendinopathy     POSTOPERATIVE DIAGNOSIS:   1. Right shoulder rotator cuff arthropathy with glenoid bone loss.   2. Right shoulder biceps tendinopathy     PROCEDURE:   1. Right reverse total shoulder arthroplasty (complex with glenoid wedge augmentation)  2. Right shoulder biceps tenodesis     SURGEON: Emanuel Aguilar M.D.     Pain well tolerated on pain medication  Sling in place  No issues reported    Review of Systems   Constitution: Negative. Negative for chills, fever and night sweats.    Cardiovascular: Negative for chest pain and syncope.   Respiratory: Negative for cough and shortness of breath.    Gastrointestinal: Negative for abdominal pain and bowel incontinence.      PE:    /83   Pulse 64   Ht 5' 4" (1.626 m)   Wt 74 kg (163 lb 2.3 oz)   BMI 28.00 kg/m²      Right shoulder    Incision healed  No sign of infection  Swelling resolved  Compartments soft  Neurovascular status intact in extremity    PROM  Forward elevation 90 degrees  External rotation 15 degrees    X-rays right shoulder (04/02/2025):  Status post reverse total shoulder arthroplasty.  Prosthetics appear in good position without any evidence of perihardware fracture/complication.  Soft tissues appear normal.    Assessment:  6 weeks s/p right reverse total shoulder arthroplasty and biceps tenodesis    Plan:  1. Continue PT   2. Discontinue sling  3. F/u in 6 weeks for 12 week post op       All questions were " answered. Instructed patient to call with questions or concerns in the interim.

## 2025-05-01 ENCOUNTER — CLINICAL SUPPORT (OUTPATIENT)
Dept: REHABILITATION | Facility: HOSPITAL | Age: 70
End: 2025-05-01
Payer: MEDICARE

## 2025-05-01 DIAGNOSIS — M19.011 OSTEOARTHRITIS OF RIGHT GLENOHUMERAL JOINT: Primary | ICD-10-CM

## 2025-05-01 DIAGNOSIS — G89.29 CHRONIC RIGHT SHOULDER PAIN: ICD-10-CM

## 2025-05-01 DIAGNOSIS — M25.611 DECREASED RANGE OF MOTION OF RIGHT SHOULDER: ICD-10-CM

## 2025-05-01 DIAGNOSIS — M25.511 CHRONIC RIGHT SHOULDER PAIN: ICD-10-CM

## 2025-05-01 DIAGNOSIS — R53.1 WEAKNESS: ICD-10-CM

## 2025-05-01 PROCEDURE — 97140 MANUAL THERAPY 1/> REGIONS: CPT

## 2025-05-01 PROCEDURE — 97112 NEUROMUSCULAR REEDUCATION: CPT

## 2025-05-01 NOTE — PROGRESS NOTES
Outpatient Rehab    Physical Therapy Visit    Patient Name: Rebecca Johnston Zollinger  MRN: [id number]  YOB: 1955  Encounter Date: 5/1/2025    Therapy Diagnosis:   Encounter Diagnoses   Name Primary?    Osteoarthritis of right glenohumeral joint Yes    Chronic right shoulder pain     Weakness     Decreased range of motion of right shoulder            Physician: Emanuel Aguilar MD    Physician Orders: Eval and Treat  Medical Diagnosis: Osteoarthritis of right glenohumeral joint    Visit # / Visits Authorized:  5 / 10  Insurance Authorization Period: 1/1/2025 to 12/31/2025  Date of Evaluation: 4/2/2025  Plan of Care Certification: 4/2/2025 to 7/2/2025      PT/PTA:     Number of PTA visits since last PT visit:   Time In: 0800   Time Out: 0844  Total Time: 44   Total Billable Time: 24    FOTO:  Intake Score:  %  Survey Score 1:  %  Survey Score 2:  %         Subjective   She has been able to take her sling off and the doctor's appointment went well..  Pain reported as 0/10.      Objective            Treatment:      THERAPEUTIC EXERCISES to develop strength, endurance, ROM, and flexibility, including:     Pulleys x3' flex/abd  Table slides  AAROM ER x30      MANUAL THERAPY TECHNIQUES were applied to Right shoulder :   (R) shld PROM per protocol      NEUROMUSCULAR RE-EDUCATION ACTIVITIES to improve Coordination, Proprioception, Posture, and Motor Control.  The following were included:  Shoulder iso 2x10 each   - flex   - ext   - add   - abd   - IR   - ER        THERAPEUTIC ACTIVITIES to improve dynamic and functional performance, including:       GAIT TRAINING to improve functional mobility and safety. The following were included:           Time Entry(in minutes):  Manual Therapy Time Entry: 14  Neuromuscular Re-Education Time Entry: 12  Therapeutic Exercise Time Entry: 18    Assessment & Plan   Assessment: Able to progress AAROM activities today with good response and adequate muscular  fatigue.  Evaluation/Treatment Tolerance: Patient tolerated treatment well    Patient will continue to benefit from skilled outpatient physical therapy to address the deficits listed in the problem list box on initial evaluation, provide pt/family education and to maximize pt's level of independence in the home and community environment.     Patient's spiritual, cultural, and educational needs considered and patient agreeable to plan of care and goals.           Plan: Monitor response and progress as tolerated.    Goals:   Active       LTG       Pt will improve FOTO score to </=62% limited to decrease perceived limitation with carrying, moving, and handling objects. (Progressing)       Start:  04/02/25    Expected End:  07/02/25            Pt will increase R shoulder AROM to WFL with in protocol in all planes to improve functional use of R RUE. (Progressing)       Start:  04/02/25    Expected End:  07/02/25            Pt will improve R shoulder MMTs by 1 grade to promote equal use of B UEs in performing functional tasks. (Progressing)       Start:  04/02/25    Expected End:  07/02/25            Pt will lift 20 lb objects without pain to promote functional QOL.  (Progressing)       Start:  04/02/25    Expected End:  07/02/25            Pt to report pain </= 2/10 with ADLs and IADLs using R UE to improve functional QOL.  (Progressing)       Start:  04/02/25    Expected End:  07/02/25               STG       Pt will be independent with HEP to supplement PT in improving functional use of R UE.  (Progressing)       Start:  04/02/25    Expected End:  05/02/25            Pt will increase pain free R shoulder elevation AROM to >/= 90 deg to improve  functional mobility of UE (Progressing)       Start:  04/02/25    Expected End:  05/02/25            Pt will increase shoulder ER PROM in 30 deg abduction to >/=45 deg to improve functional mobility of UE (Progressing)       Start:  04/02/25    Expected End:  05/02/25             Pt will improve R shoulder MMTs to 3+/5 to promote equal use of B UEs in performing functional tasks. (Progressing)       Start:  04/02/25    Expected End:  05/02/25                Guru Milian, PT, DPT

## 2025-05-06 ENCOUNTER — CLINICAL SUPPORT (OUTPATIENT)
Dept: REHABILITATION | Facility: HOSPITAL | Age: 70
End: 2025-05-06
Payer: MEDICARE

## 2025-05-06 DIAGNOSIS — M25.511 CHRONIC RIGHT SHOULDER PAIN: ICD-10-CM

## 2025-05-06 DIAGNOSIS — R53.1 WEAKNESS: ICD-10-CM

## 2025-05-06 DIAGNOSIS — G89.29 CHRONIC RIGHT SHOULDER PAIN: ICD-10-CM

## 2025-05-06 DIAGNOSIS — M25.611 DECREASED RANGE OF MOTION OF RIGHT SHOULDER: ICD-10-CM

## 2025-05-06 DIAGNOSIS — M19.011 OSTEOARTHRITIS OF RIGHT GLENOHUMERAL JOINT: Primary | ICD-10-CM

## 2025-05-06 PROCEDURE — 97112 NEUROMUSCULAR REEDUCATION: CPT

## 2025-05-06 PROCEDURE — 97140 MANUAL THERAPY 1/> REGIONS: CPT

## 2025-05-06 PROCEDURE — 97110 THERAPEUTIC EXERCISES: CPT

## 2025-05-06 NOTE — PROGRESS NOTES
Outpatient Rehab    Physical Therapy Visit    Patient Name: Rebecca Johnston Zollinger  MRN: [id number]  YOB: 1955  Encounter Date: 5/6/2025    Therapy Diagnosis:   Encounter Diagnoses   Name Primary?    Osteoarthritis of right glenohumeral joint Yes    Chronic right shoulder pain     Weakness     Decreased range of motion of right shoulder              Physician: Emanuel Aguilar MD    Physician Orders: Eval and Treat  Medical Diagnosis: Osteoarthritis of right glenohumeral joint    Visit # / Visits Authorized:  6 / 10  Insurance Authorization Period: 1/1/2025 to 12/31/2025  Date of Evaluation: 4/2/2025  Plan of Care Certification: 4/2/2025 to 7/2/2025      PT/PTA:     Number of PTA visits since last PT visit:   Time In: 1100   Time Out: 1150  Total Time: 50   Total Billable Time: 38    FOTO:  Intake Score:  %  Survey Score 1:  %  Survey Score 2:  %         Subjective   She has been having some pain but has been trying to be aware of not bringing her shoulder behind her back..         Objective            Treatment:      THERAPEUTIC EXERCISES to develop strength, endurance, ROM, and flexibility, including:     Pulleys x3' flex/abd  Table slides 2x10, 5s  AAROM ER T-bar x30  Supine chest press x15 with T-bar  SL scap retraction 3x10    MANUAL THERAPY TECHNIQUES were applied to Right shoulder :   (R) shld PROM per protocol      NEUROMUSCULAR RE-EDUCATION ACTIVITIES to improve Coordination, Proprioception, Posture, and Motor Control.  The following were included:  Shoulder iso 2x10 each   - flex   - ext   - add   - abd   - IR   - ER        THERAPEUTIC ACTIVITIES to improve dynamic and functional performance, including:       GAIT TRAINING to improve functional mobility and safety. The following were included:           Time Entry(in minutes):  Manual Therapy Time Entry: 15  Neuromuscular Re-Education Time Entry: 12  Therapeutic Exercise Time Entry: 20    Assessment & Plan   Assessment: Able to make  minor progressions today without adverse effect though she was limited by fatigue during chest press.  Evaluation/Treatment Tolerance: Patient tolerated treatment well    Patient will continue to benefit from skilled outpatient physical therapy to address the deficits listed in the problem list box on initial evaluation, provide pt/family education and to maximize pt's level of independence in the home and community environment.     Patient's spiritual, cultural, and educational needs considered and patient agreeable to plan of care and goals.           Plan: Monitor response and progress as tolerated.    Goals:   Active       LTG       Pt will improve FOTO score to </=62% limited to decrease perceived limitation with carrying, moving, and handling objects. (Progressing)       Start:  04/02/25    Expected End:  07/02/25            Pt will increase R shoulder AROM to WFL with in protocol in all planes to improve functional use of R RUE. (Progressing)       Start:  04/02/25    Expected End:  07/02/25            Pt will improve R shoulder MMTs by 1 grade to promote equal use of B UEs in performing functional tasks. (Progressing)       Start:  04/02/25    Expected End:  07/02/25            Pt will lift 20 lb objects without pain to promote functional QOL.  (Progressing)       Start:  04/02/25    Expected End:  07/02/25            Pt to report pain </= 2/10 with ADLs and IADLs using R UE to improve functional QOL.  (Progressing)       Start:  04/02/25    Expected End:  07/02/25               STG       Pt will be independent with HEP to supplement PT in improving functional use of R UE.  (Progressing)       Start:  04/02/25    Expected End:  05/02/25            Pt will increase pain free R shoulder elevation AROM to >/= 90 deg to improve  functional mobility of UE (Progressing)       Start:  04/02/25    Expected End:  05/02/25            Pt will increase shoulder ER PROM in 30 deg abduction to >/=45 deg to improve  functional mobility of UE (Progressing)       Start:  04/02/25    Expected End:  05/02/25            Pt will improve R shoulder MMTs to 3+/5 to promote equal use of B UEs in performing functional tasks. (Progressing)       Start:  04/02/25    Expected End:  05/02/25                Guru Milian, PT, DPT

## 2025-05-08 ENCOUNTER — CLINICAL SUPPORT (OUTPATIENT)
Dept: REHABILITATION | Facility: HOSPITAL | Age: 70
End: 2025-05-08
Payer: MEDICARE

## 2025-05-08 DIAGNOSIS — M25.611 DECREASED RANGE OF MOTION OF RIGHT SHOULDER: ICD-10-CM

## 2025-05-08 DIAGNOSIS — R53.1 WEAKNESS: ICD-10-CM

## 2025-05-08 DIAGNOSIS — M25.511 CHRONIC RIGHT SHOULDER PAIN: ICD-10-CM

## 2025-05-08 DIAGNOSIS — M19.011 OSTEOARTHRITIS OF RIGHT GLENOHUMERAL JOINT: Primary | ICD-10-CM

## 2025-05-08 DIAGNOSIS — G89.29 CHRONIC RIGHT SHOULDER PAIN: ICD-10-CM

## 2025-05-08 PROCEDURE — 97110 THERAPEUTIC EXERCISES: CPT

## 2025-05-08 PROCEDURE — 97140 MANUAL THERAPY 1/> REGIONS: CPT

## 2025-05-08 NOTE — PROGRESS NOTES
Outpatient Rehab    Physical Therapy Visit    Patient Name: Rebecca Johnston Zollinger  MRN: [id number]  YOB: 1955  Encounter Date: 5/8/2025    Therapy Diagnosis:   Encounter Diagnoses   Name Primary?    Osteoarthritis of right glenohumeral joint Yes    Chronic right shoulder pain     Weakness     Decreased range of motion of right shoulder                Physician: Emanuel Aguilar MD    Physician Orders: Eval and Treat  Medical Diagnosis: Osteoarthritis of right glenohumeral joint    Visit # / Visits Authorized:  7 / 10  Insurance Authorization Period: 1/1/2025 to 12/31/2025  Date of Evaluation: 4/2/2025  Plan of Care Certification: 4/2/2025 to 7/2/2025      PT/PTA:     Number of PTA visits since last PT visit:   Time In: 0757   Time Out: 0845  Total Time: 48   Total Billable Time: 48    FOTO:  Intake Score:  %  Survey Score 1:  %  Survey Score 2:  %         Subjective   She is still having the pain on top the shoulder blade and has been having some pain on the outside of the shoulder too..  Pain reported as 3/10.      Objective            Treatment:      THERAPEUTIC EXERCISES to develop strength, endurance, ROM, and flexibility, including:     Pulleys x3' flex/abd  Table slides 2x10, 5s  AAROM ER T-bar x30  Supine chest press x15 with T-bar  SL scap retraction 3x10  Seated T-bar assist flex 2x10    MANUAL THERAPY TECHNIQUES were applied to Right shoulder :   (R) shld PROM per protocol  Static cupping to levator scapulae and upper trap       NEUROMUSCULAR RE-EDUCATION ACTIVITIES to improve Coordination, Proprioception, Posture, and Motor Control.  The following were included:  Shoulder iso 2x10 each - NT   - flex   - ext   - add   - abd   - IR   - ER        THERAPEUTIC ACTIVITIES to improve dynamic and functional performance, including:       GAIT TRAINING to improve functional mobility and safety. The following were included:           Time Entry(in minutes):  Manual Therapy Time Entry:  25  Therapeutic Exercise Time Entry: 23    Assessment & Plan   Assessment: Added cupping to address painful and hypertonic LS with good response. Pt progressing well with TE.  Evaluation/Treatment Tolerance: Patient tolerated treatment well    Patient will continue to benefit from skilled outpatient physical therapy to address the deficits listed in the problem list box on initial evaluation, provide pt/family education and to maximize pt's level of independence in the home and community environment.     Patient's spiritual, cultural, and educational needs considered and patient agreeable to plan of care and goals.           Plan: Monitor response and progress as tolerated.    Goals:   Active       LTG       Pt will improve FOTO score to </=62% limited to decrease perceived limitation with carrying, moving, and handling objects. (Progressing)       Start:  04/02/25    Expected End:  07/02/25            Pt will increase R shoulder AROM to WFL with in protocol in all planes to improve functional use of R RUE. (Progressing)       Start:  04/02/25    Expected End:  07/02/25            Pt will improve R shoulder MMTs by 1 grade to promote equal use of B UEs in performing functional tasks. (Progressing)       Start:  04/02/25    Expected End:  07/02/25            Pt will lift 20 lb objects without pain to promote functional QOL.  (Progressing)       Start:  04/02/25    Expected End:  07/02/25            Pt to report pain </= 2/10 with ADLs and IADLs using R UE to improve functional QOL.  (Progressing)       Start:  04/02/25    Expected End:  07/02/25               STG       Pt will be independent with HEP to supplement PT in improving functional use of R UE.  (Progressing)       Start:  04/02/25    Expected End:  05/02/25            Pt will increase pain free R shoulder elevation AROM to >/= 90 deg to improve  functional mobility of UE (Progressing)       Start:  04/02/25    Expected End:  05/02/25            Pt will  increase shoulder ER PROM in 30 deg abduction to >/=45 deg to improve functional mobility of UE (Progressing)       Start:  04/02/25    Expected End:  05/02/25            Pt will improve R shoulder MMTs to 3+/5 to promote equal use of B UEs in performing functional tasks. (Progressing)       Start:  04/02/25    Expected End:  05/02/25                Guru Milian, PT, DPT

## 2025-05-13 ENCOUNTER — CLINICAL SUPPORT (OUTPATIENT)
Dept: REHABILITATION | Facility: HOSPITAL | Age: 70
End: 2025-05-13
Payer: MEDICARE

## 2025-05-13 DIAGNOSIS — M25.511 CHRONIC RIGHT SHOULDER PAIN: ICD-10-CM

## 2025-05-13 DIAGNOSIS — M25.611 DECREASED RANGE OF MOTION OF RIGHT SHOULDER: ICD-10-CM

## 2025-05-13 DIAGNOSIS — G89.29 CHRONIC RIGHT SHOULDER PAIN: ICD-10-CM

## 2025-05-13 DIAGNOSIS — R53.1 WEAKNESS: ICD-10-CM

## 2025-05-13 DIAGNOSIS — M19.011 OSTEOARTHRITIS OF RIGHT GLENOHUMERAL JOINT: Primary | ICD-10-CM

## 2025-05-13 PROCEDURE — 97110 THERAPEUTIC EXERCISES: CPT

## 2025-05-13 PROCEDURE — 97112 NEUROMUSCULAR REEDUCATION: CPT

## 2025-05-13 NOTE — PROGRESS NOTES
Outpatient Rehab    Physical Therapy Visit    Patient Name: Rebecca Johnston Zollinger  MRN: [id number]  YOB: 1955  Encounter Date: 5/13/2025    Therapy Diagnosis:   Encounter Diagnoses   Name Primary?    Osteoarthritis of right glenohumeral joint Yes    Chronic right shoulder pain     Weakness     Decreased range of motion of right shoulder                Physician: Emanuel Aguilar MD    Physician Orders: Eval and Treat  Medical Diagnosis: Osteoarthritis of right glenohumeral joint    Visit # / Visits Authorized:  8 / 20  Insurance Authorization Period: 1/1/2025 to 12/31/2025  Date of Evaluation: 4/2/2025  Plan of Care Certification: 4/2/2025 to 7/2/2025      PT/PTA:     Number of PTA visits since last PT visit:   Time In: 0805   Time Out: 0853  Total Time: 48   Total Billable Time: 48    FOTO:  Intake Score:  %  Survey Score 1:  %  Survey Score 2:  %         Subjective   She had soreness pain in biceps, triceps and shoulder blade for two days after last session. The past two days have been very inacive for her..         Objective            Treatment:      THERAPEUTIC EXERCISES to develop strength, endurance, ROM, and flexibility, including:     Pulleys x3' flex/abd  Table slides 2x10, 5s  AAROM ER T-bar x30  Supine chest press 2x10 with T-bar  SL scap retraction 3x10  Seated T-bar assist flex 2x10  Standing AROM flex, scap, abd x10 each    MANUAL THERAPY TECHNIQUES were applied to Right shoulder :   (R) shld PROM per protocol  Static cupping to levator scapulae and upper trap       NEUROMUSCULAR RE-EDUCATION ACTIVITIES to improve Coordination, Proprioception, Posture, and Motor Control.  The following were included:  Shoulder iso 2x10 each   - flex   - ext   - add   - abd   - IR   - ER      Time Entry(in minutes):  Manual Therapy Time Entry: 15  Neuromuscular Re-Education Time Entry: 8  Therapeutic Exercise Time Entry: 25    Assessment & Plan   Assessment: Progressed AROM today per protocol  with fairly good tolerance and appropriate muscular fatigue.  Evaluation/Treatment Tolerance: Patient tolerated treatment well    Patient will continue to benefit from skilled outpatient physical therapy to address the deficits listed in the problem list box on initial evaluation, provide pt/family education and to maximize pt's level of independence in the home and community environment.     Patient's spiritual, cultural, and educational needs considered and patient agreeable to plan of care and goals.           Plan: Monitor response and progress as tolerated.    Goals:   Active       LTG       Pt will improve FOTO score to </=62% limited to decrease perceived limitation with carrying, moving, and handling objects. (Progressing)       Start:  04/02/25    Expected End:  07/02/25            Pt will increase R shoulder AROM to WFL with in protocol in all planes to improve functional use of R RUE. (Progressing)       Start:  04/02/25    Expected End:  07/02/25            Pt will improve R shoulder MMTs by 1 grade to promote equal use of B UEs in performing functional tasks. (Progressing)       Start:  04/02/25    Expected End:  07/02/25            Pt will lift 20 lb objects without pain to promote functional QOL.  (Progressing)       Start:  04/02/25    Expected End:  07/02/25            Pt to report pain </= 2/10 with ADLs and IADLs using R UE to improve functional QOL.  (Progressing)       Start:  04/02/25    Expected End:  07/02/25               STG       Pt will be independent with HEP to supplement PT in improving functional use of R UE.  (Progressing)       Start:  04/02/25    Expected End:  05/02/25            Pt will increase pain free R shoulder elevation AROM to >/= 90 deg to improve  functional mobility of UE (Progressing)       Start:  04/02/25    Expected End:  05/02/25            Pt will increase shoulder ER PROM in 30 deg abduction to >/=45 deg to improve functional mobility of UE (Progressing)        Start:  04/02/25    Expected End:  05/02/25            Pt will improve R shoulder MMTs to 3+/5 to promote equal use of B UEs in performing functional tasks. (Progressing)       Start:  04/02/25    Expected End:  05/02/25                Guru Milian, PT, DPT

## 2025-05-14 DIAGNOSIS — M19.011 OSTEOARTHRITIS OF RIGHT GLENOHUMERAL JOINT: ICD-10-CM

## 2025-05-14 RX ORDER — CELECOXIB 200 MG/1
200 CAPSULE ORAL
Qty: 30 CAPSULE | Refills: 1 | Status: SHIPPED | OUTPATIENT
Start: 2025-05-14

## 2025-05-16 ENCOUNTER — CLINICAL SUPPORT (OUTPATIENT)
Dept: REHABILITATION | Facility: HOSPITAL | Age: 70
End: 2025-05-16
Payer: MEDICARE

## 2025-05-16 DIAGNOSIS — M19.011 OSTEOARTHRITIS OF RIGHT GLENOHUMERAL JOINT: Primary | ICD-10-CM

## 2025-05-16 DIAGNOSIS — M25.611 DECREASED RANGE OF MOTION OF RIGHT SHOULDER: ICD-10-CM

## 2025-05-16 DIAGNOSIS — R53.1 WEAKNESS: ICD-10-CM

## 2025-05-16 DIAGNOSIS — M25.511 CHRONIC RIGHT SHOULDER PAIN: ICD-10-CM

## 2025-05-16 DIAGNOSIS — G89.29 CHRONIC RIGHT SHOULDER PAIN: ICD-10-CM

## 2025-05-16 PROCEDURE — 97112 NEUROMUSCULAR REEDUCATION: CPT | Mod: CQ

## 2025-05-16 PROCEDURE — 97140 MANUAL THERAPY 1/> REGIONS: CPT | Mod: CQ

## 2025-05-16 PROCEDURE — 97110 THERAPEUTIC EXERCISES: CPT | Mod: CQ

## 2025-05-16 NOTE — PROGRESS NOTES
Outpatient Rehab    Physical Therapy Visit    Patient Name: Rebecca Johnston Zollinger  MRN: [id number]  YOB: 1955  Encounter Date: 5/16/2025    Therapy Diagnosis:   Encounter Diagnoses   Name Primary?    Osteoarthritis of right glenohumeral joint Yes    Chronic right shoulder pain     Weakness     Decreased range of motion of right shoulder            Physician: Emanuel Aguilar MD    Physician Orders: Eval and Treat  Medical Diagnosis: Osteoarthritis of right glenohumeral joint    Visit # / Visits Authorized:  9 / 20  Insurance Authorization Period: 1/1/2025 to 12/31/2025  Date of Evaluation: 4/2/2025  Plan of Care Certification: 4/2/2025 to 7/2/2025      PT/PTA: PTA   Number of PTA visits since last PT visit:1  Time In: 0703   Time Out: 0757  Total Time: 54   Total Billable Time: 54    FOTO:  Intake Score:  %  Survey Score 1:  %  Survey Score 2:  %         Subjective   Pt reports that her bicep was hurting more last night after doing her exercises..         Objective            Treatment:      THERAPEUTIC EXERCISES to develop strength, endurance, ROM, and flexibility, including:     Pulleys x3' flex/abd  Table slides 2x10, 5s  AAROM ER T-bar x30  Supine chest press 2x10 with T-bar  SL scap retraction 3x10  Seated T-bar assist flex 2x10  Standing AROM flex, scap, abd x10 each    MANUAL THERAPY TECHNIQUES were applied to Right shoulder :   (R) shld PROM per protocol  Static cupping to levator scapulae and upper trap   STM (R) biceps     NEUROMUSCULAR RE-EDUCATION ACTIVITIES to improve Coordination, Proprioception, Posture, and Motor Control.  The following were included:  Shoulder iso 2x10 each   - flex   - ext   - add   - abd   - IR   - ER      Time Entry(in minutes):  Manual Therapy Time Entry: 15  Neuromuscular Re-Education Time Entry: 8  Therapeutic Exercise Time Entry: 31    Assessment & Plan   Assessment: Continued with established activities per protocol. Pt required tactile cues during  sidelying scapular retractions to ensure proper form. Incorporated STM to right biceps due to noted pain and noted moderate hypertonicity in muscle belly.  Evaluation/Treatment Tolerance: Patient tolerated treatment well    Patient will continue to benefit from skilled outpatient physical therapy to address the deficits listed in the problem list box on initial evaluation, provide pt/family education and to maximize pt's level of independence in the home and community environment.     Patient's spiritual, cultural, and educational needs considered and patient agreeable to plan of care and goals.           Plan: Monitor response and progress as tolerated.    Goals:   Active       LTG       Pt will improve FOTO score to </=62% limited to decrease perceived limitation with carrying, moving, and handling objects. (Progressing)       Start:  04/02/25    Expected End:  07/02/25            Pt will increase R shoulder AROM to WFL with in protocol in all planes to improve functional use of R RUE. (Progressing)       Start:  04/02/25    Expected End:  07/02/25            Pt will improve R shoulder MMTs by 1 grade to promote equal use of B UEs in performing functional tasks. (Progressing)       Start:  04/02/25    Expected End:  07/02/25            Pt will lift 20 lb objects without pain to promote functional QOL.  (Progressing)       Start:  04/02/25    Expected End:  07/02/25            Pt to report pain </= 2/10 with ADLs and IADLs using R UE to improve functional QOL.  (Progressing)       Start:  04/02/25    Expected End:  07/02/25               STG       Pt will be independent with HEP to supplement PT in improving functional use of R UE.  (Progressing)       Start:  04/02/25    Expected End:  05/02/25            Pt will increase pain free R shoulder elevation AROM to >/= 90 deg to improve  functional mobility of UE (Progressing)       Start:  04/02/25    Expected End:  05/02/25            Pt will increase shoulder ER  PROM in 30 deg abduction to >/=45 deg to improve functional mobility of UE (Progressing)       Start:  04/02/25    Expected End:  05/02/25            Pt will improve R shoulder MMTs to 3+/5 to promote equal use of B UEs in performing functional tasks. (Progressing)       Start:  04/02/25    Expected End:  05/02/25                  Danette Harper, PTA

## 2025-05-20 ENCOUNTER — CLINICAL SUPPORT (OUTPATIENT)
Dept: REHABILITATION | Facility: HOSPITAL | Age: 70
End: 2025-05-20
Payer: MEDICARE

## 2025-05-20 DIAGNOSIS — G89.29 CHRONIC RIGHT SHOULDER PAIN: ICD-10-CM

## 2025-05-20 DIAGNOSIS — R53.1 WEAKNESS: ICD-10-CM

## 2025-05-20 DIAGNOSIS — M25.611 DECREASED RANGE OF MOTION OF RIGHT SHOULDER: ICD-10-CM

## 2025-05-20 DIAGNOSIS — M25.511 CHRONIC RIGHT SHOULDER PAIN: ICD-10-CM

## 2025-05-20 DIAGNOSIS — M19.011 OSTEOARTHRITIS OF RIGHT GLENOHUMERAL JOINT: Primary | ICD-10-CM

## 2025-05-20 PROCEDURE — 97112 NEUROMUSCULAR REEDUCATION: CPT

## 2025-05-20 PROCEDURE — 97140 MANUAL THERAPY 1/> REGIONS: CPT

## 2025-05-20 PROCEDURE — 97110 THERAPEUTIC EXERCISES: CPT

## 2025-05-20 NOTE — PROGRESS NOTES
Outpatient Rehab    Physical Therapy Visit    Patient Name: Rebecca Johnston Zollinger  MRN: [id number]  YOB: 1955  Encounter Date: 5/20/2025    Therapy Diagnosis:   Encounter Diagnoses   Name Primary?    Osteoarthritis of right glenohumeral joint Yes    Chronic right shoulder pain     Weakness     Decreased range of motion of right shoulder            Physician: Emanuel Aguilar MD    Physician Orders: Eval and Treat  Medical Diagnosis: Osteoarthritis of right glenohumeral joint    Visit # / Visits Authorized:  10 / 20  Insurance Authorization Period: 1/1/2025 to 12/31/2025  Date of Evaluation: 4/2/2025  Plan of Care Certification: 4/2/2025 to 7/2/2025      PT/PTA:     Number of PTA visits since last PT visit:   Time In: 0800   Time Out: 0854  Total Time: 54   Total Billable Time: 40    FOTO:  Intake Score:  %  Survey Score 1:  %  Survey Score 2:  %         Subjective   She continues with pain in the front of the arm, especially when moving certain ways..         Objective            Treatment:      THERAPEUTIC EXERCISES to develop strength, endurance, ROM, and flexibility, including:     Pulleys x3' flex/abd  Table slides 2x10, 5s  AAROM ER T-bar x30  Seated chest press 2x10 with ball  SL scap retraction 3x10  Seated T-bar assist flex 2x10  Standing AROM flex, scap, abd x10 each  Step outs ER/IR 2x10 each GTB    MANUAL THERAPY TECHNIQUES were applied to Right shoulder :   (R) shld PROM per protocol  Static cupping to levator scapulae and upper trap - NT  STM (R) biceps     NEUROMUSCULAR RE-EDUCATION ACTIVITIES to improve Coordination, Proprioception, Posture, and Motor Control.  The following were included:  Shoulder iso 2x10 each   - flex   - ext   - add   - abd        Time Entry(in minutes):  Manual Therapy Time Entry: 15  Neuromuscular Re-Education Time Entry: 8  Therapeutic Exercise Time Entry: 31    Assessment & Plan   Assessment: Able to make some minor progressions in strength with good  response. She continues with hypertonicity to anterior shoulder that improved with STM.  Evaluation/Treatment Tolerance: Patient tolerated treatment well    Patient will continue to benefit from skilled outpatient physical therapy to address the deficits listed in the problem list box on initial evaluation, provide pt/family education and to maximize pt's level of independence in the home and community environment.     Patient's spiritual, cultural, and educational needs considered and patient agreeable to plan of care and goals.           Plan: Monitor response and progress as tolerated.    Goals:   Active       LTG       Pt will improve FOTO score to </=62% limited to decrease perceived limitation with carrying, moving, and handling objects. (Progressing)       Start:  04/02/25    Expected End:  07/02/25            Pt will increase R shoulder AROM to WFL with in protocol in all planes to improve functional use of R RUE. (Progressing)       Start:  04/02/25    Expected End:  07/02/25            Pt will improve R shoulder MMTs by 1 grade to promote equal use of B UEs in performing functional tasks. (Progressing)       Start:  04/02/25    Expected End:  07/02/25            Pt will lift 20 lb objects without pain to promote functional QOL.  (Progressing)       Start:  04/02/25    Expected End:  07/02/25            Pt to report pain </= 2/10 with ADLs and IADLs using R UE to improve functional QOL.  (Progressing)       Start:  04/02/25    Expected End:  07/02/25               STG       Pt will be independent with HEP to supplement PT in improving functional use of R UE.  (Progressing)       Start:  04/02/25    Expected End:  05/02/25            Pt will increase pain free R shoulder elevation AROM to >/= 90 deg to improve  functional mobility of UE (Progressing)       Start:  04/02/25    Expected End:  05/02/25            Pt will increase shoulder ER PROM in 30 deg abduction to >/=45 deg to improve functional mobility  of UE (Progressing)       Start:  04/02/25    Expected End:  05/02/25            Pt will improve R shoulder MMTs to 3+/5 to promote equal use of B UEs in performing functional tasks. (Progressing)       Start:  04/02/25    Expected End:  05/02/25                  Guru Milian, PT, DPT

## 2025-05-21 NOTE — PROGRESS NOTES
Outpatient Rehab    Physical Therapy Visit    Patient Name: Rebecca Johnston Zollinger  MRN: [id number]  YOB: 1955  Encounter Date: 5/22/2025    Therapy Diagnosis:   Encounter Diagnoses   Name Primary?    Osteoarthritis of right glenohumeral joint Yes    Chronic right shoulder pain     Weakness     Decreased range of motion of right shoulder          Physician: Emanuel Aguilar MD    Physician Orders: Eval and Treat  Medical Diagnosis: Osteoarthritis of right glenohumeral joint    Visit # / Visits Authorized:  11 / 20  Insurance Authorization Period: 1/1/2025 to 12/31/2025  Date of Evaluation: 4/2/2025  Plan of Care Certification: 4/2/2025 to 7/2/2025      PT/PTA: PTA   Number of PTA visits since last PT visit:2  Time In: 0758   Time Out: 0851  Total Time: 53   Total Billable Time: 27    FOTO:  Intake Score:  %  Survey Score 1:  %  Survey Score 2:  %         Subjective   Pt reports that she is having some soreness in her biceps and triceps today..         Objective            Treatment:      THERAPEUTIC EXERCISES to develop strength, endurance, ROM, and flexibility, including:     Pulleys x3' flex/abd  Table slides 2x10, 5s  AAROM ER T-bar x30  Seated chest press 2x10 with ball  SL scap retraction 3x10  Seated T-bar assist flex 14x   Standing AROM flex, scap, abd 2x10 each (held abd today)   Step outs ER/IR 2x10 each GTB    MANUAL THERAPY TECHNIQUES were applied to Right shoulder :   (R) shld PROM per protocol  Static cupping to levator scapulae and upper trap - NT  STM and static cupping (R) biceps     NEUROMUSCULAR RE-EDUCATION ACTIVITIES to improve Coordination, Proprioception, Posture, and Motor Control.  The following were included:(NT)   Shoulder iso 2x10 each   - flex   - ext   - add   - abd        Time Entry(in minutes):  Manual Therapy Time Entry: 15  Therapeutic Exercise Time Entry: 38    Assessment & Plan   Assessment: Pt noted pain in shoulder during standing abductions, therefore  held this activitiy. Pt also noted increasing pain with seated shoulder flexion with band, therefore held at 14 reps. Incorporated static cupping to biceps to reduce pain and muscle tension in this area.  Evaluation/Treatment Tolerance: Patient tolerated treatment well    Patient will continue to benefit from skilled outpatient physical therapy to address the deficits listed in the problem list box on initial evaluation, provide pt/family education and to maximize pt's level of independence in the home and community environment.     Patient's spiritual, cultural, and educational needs considered and patient agreeable to plan of care and goals.           Plan: Monitor response and progress as tolerated.    Goals:   Active       LTG       Pt will improve FOTO score to </=62% limited to decrease perceived limitation with carrying, moving, and handling objects. (Progressing)       Start:  04/02/25    Expected End:  07/02/25            Pt will increase R shoulder AROM to WFL with in protocol in all planes to improve functional use of R RUE. (Progressing)       Start:  04/02/25    Expected End:  07/02/25            Pt will improve R shoulder MMTs by 1 grade to promote equal use of B UEs in performing functional tasks. (Progressing)       Start:  04/02/25    Expected End:  07/02/25            Pt will lift 20 lb objects without pain to promote functional QOL.  (Progressing)       Start:  04/02/25    Expected End:  07/02/25            Pt to report pain </= 2/10 with ADLs and IADLs using R UE to improve functional QOL.  (Progressing)       Start:  04/02/25    Expected End:  07/02/25               STG       Pt will be independent with HEP to supplement PT in improving functional use of R UE.  (Progressing)       Start:  04/02/25    Expected End:  05/02/25            Pt will increase pain free R shoulder elevation AROM to >/= 90 deg to improve  functional mobility of UE (Progressing)       Start:  04/02/25    Expected End:   05/02/25            Pt will increase shoulder ER PROM in 30 deg abduction to >/=45 deg to improve functional mobility of UE (Progressing)       Start:  04/02/25    Expected End:  05/02/25            Pt will improve R shoulder MMTs to 3+/5 to promote equal use of B UEs in performing functional tasks. (Progressing)       Start:  04/02/25    Expected End:  05/02/25                  Danette Harper, PTA

## 2025-05-22 ENCOUNTER — CLINICAL SUPPORT (OUTPATIENT)
Dept: REHABILITATION | Facility: HOSPITAL | Age: 70
End: 2025-05-22
Payer: MEDICARE

## 2025-05-22 DIAGNOSIS — R53.1 WEAKNESS: ICD-10-CM

## 2025-05-22 DIAGNOSIS — M19.011 OSTEOARTHRITIS OF RIGHT GLENOHUMERAL JOINT: Primary | ICD-10-CM

## 2025-05-22 DIAGNOSIS — G89.29 CHRONIC RIGHT SHOULDER PAIN: ICD-10-CM

## 2025-05-22 DIAGNOSIS — M25.611 DECREASED RANGE OF MOTION OF RIGHT SHOULDER: ICD-10-CM

## 2025-05-22 DIAGNOSIS — M25.511 CHRONIC RIGHT SHOULDER PAIN: ICD-10-CM

## 2025-05-22 PROCEDURE — 97110 THERAPEUTIC EXERCISES: CPT | Mod: CQ

## 2025-05-27 ENCOUNTER — CLINICAL SUPPORT (OUTPATIENT)
Dept: REHABILITATION | Facility: HOSPITAL | Age: 70
End: 2025-05-27
Payer: MEDICARE

## 2025-05-27 DIAGNOSIS — M25.511 CHRONIC RIGHT SHOULDER PAIN: ICD-10-CM

## 2025-05-27 DIAGNOSIS — M19.011 OSTEOARTHRITIS OF RIGHT GLENOHUMERAL JOINT: Primary | ICD-10-CM

## 2025-05-27 DIAGNOSIS — R53.1 WEAKNESS: ICD-10-CM

## 2025-05-27 DIAGNOSIS — M25.611 DECREASED RANGE OF MOTION OF RIGHT SHOULDER: ICD-10-CM

## 2025-05-27 DIAGNOSIS — G89.29 CHRONIC RIGHT SHOULDER PAIN: ICD-10-CM

## 2025-05-27 PROCEDURE — 97110 THERAPEUTIC EXERCISES: CPT

## 2025-05-27 PROCEDURE — 97112 NEUROMUSCULAR REEDUCATION: CPT

## 2025-05-27 PROCEDURE — 97140 MANUAL THERAPY 1/> REGIONS: CPT

## 2025-05-27 NOTE — PROGRESS NOTES
Outpatient Rehab    Physical Therapy Visit    Patient Name: Rebecca Johnston Zollinger  MRN: [id number]  YOB: 1955  Encounter Date: 5/27/2025    Therapy Diagnosis:   Encounter Diagnoses   Name Primary?    Osteoarthritis of right glenohumeral joint Yes    Chronic right shoulder pain     Weakness     Decreased range of motion of right shoulder          Physician: Emanuel Aguilar MD    Physician Orders: Eval and Treat  Medical Diagnosis: Osteoarthritis of right glenohumeral joint    Visit # / Visits Authorized:  12 / 20  Insurance Authorization Period: 1/1/2025 to 12/31/2025  Date of Evaluation: 4/2/2025  Plan of Care Certification: 4/2/2025 to 7/2/2025      PT/PTA:     Number of PTA visits since last PT visit:   Time In: 0801   Time Out: 0905  Total Time: 64   Total Billable Time: 45 (some time noted billed d/t cupping and 1:1 guidelines)    FOTO:  Intake Score:  %  Survey Score 1:  %  Survey Score 2:  %         Subjective   She still gets the sharp pain if she moves the wrong way. She has been using the sling when she does her walking to avoid throwing the arm too far behind her back..  Pain reported as 3/10.      Objective            Treatment:      THERAPEUTIC EXERCISES to develop strength, endurance, ROM, and flexibility, including:     Pulleys x3' flex/abd  Table slides 2x10, 5s  AAROM ER T-bar x30  Seated chest press 2x10 with ball  SL scap retraction 3x10  Seated T-bar assist flex 14x   Standing AROM flex, scap, abd 2x10 each (held abd today)   Step outs ER/IR 2x10 each GTB    MANUAL THERAPY TECHNIQUES were applied to Right shoulder :   (R) shld PROM per protocol  Static cupping to levator scapulae and upper trap - NT  STM and static cupping (R) biceps   Gentle sup GHJ glides    NEUROMUSCULAR RE-EDUCATION ACTIVITIES to improve Coordination, Proprioception, Posture, and Motor Control.  The following were included:(NT)   Shoulder iso 2x10 each   - flex   - ext   - add   - abd        Time  Entry(in minutes):  Manual Therapy Time Entry: 14  Neuromuscular Re-Education Time Entry: 10  Therapeutic Exercise Time Entry: 40    Assessment & Plan   Assessment: Progressed functional strengthening with SAPD with good response. Improved tolerance to overhead motion with supine activities after gentle superior glides. Good response and no adverse effects.  Evaluation/Treatment Tolerance: Patient tolerated treatment well    Patient will continue to benefit from skilled outpatient physical therapy to address the deficits listed in the problem list box on initial evaluation, provide pt/family education and to maximize pt's level of independence in the home and community environment.     Patient's spiritual, cultural, and educational needs considered and patient agreeable to plan of care and goals.           Plan: Monitor response and progress as tolerated.    Goals:   Active       LTG       Pt will improve FOTO score to </=62% limited to decrease perceived limitation with carrying, moving, and handling objects. (Progressing)       Start:  04/02/25    Expected End:  07/02/25            Pt will increase R shoulder AROM to WFL with in protocol in all planes to improve functional use of R RUE. (Progressing)       Start:  04/02/25    Expected End:  07/02/25            Pt will improve R shoulder MMTs by 1 grade to promote equal use of B UEs in performing functional tasks. (Progressing)       Start:  04/02/25    Expected End:  07/02/25            Pt will lift 20 lb objects without pain to promote functional QOL.  (Progressing)       Start:  04/02/25    Expected End:  07/02/25            Pt to report pain </= 2/10 with ADLs and IADLs using R UE to improve functional QOL.  (Progressing)       Start:  04/02/25    Expected End:  07/02/25               STG       Pt will be independent with HEP to supplement PT in improving functional use of R UE.  (Progressing)       Start:  04/02/25    Expected End:  05/02/25            Pt will  increase pain free R shoulder elevation AROM to >/= 90 deg to improve  functional mobility of UE (Progressing)       Start:  04/02/25    Expected End:  05/02/25            Pt will increase shoulder ER PROM in 30 deg abduction to >/=45 deg to improve functional mobility of UE (Progressing)       Start:  04/02/25    Expected End:  05/02/25            Pt will improve R shoulder MMTs to 3+/5 to promote equal use of B UEs in performing functional tasks. (Progressing)       Start:  04/02/25    Expected End:  05/02/25                  Guru Milian, PT, DPT

## 2025-05-29 ENCOUNTER — CLINICAL SUPPORT (OUTPATIENT)
Dept: REHABILITATION | Facility: HOSPITAL | Age: 70
End: 2025-05-29
Payer: MEDICARE

## 2025-05-29 DIAGNOSIS — M25.611 DECREASED RANGE OF MOTION OF RIGHT SHOULDER: ICD-10-CM

## 2025-05-29 DIAGNOSIS — M25.511 CHRONIC RIGHT SHOULDER PAIN: ICD-10-CM

## 2025-05-29 DIAGNOSIS — G89.29 CHRONIC RIGHT SHOULDER PAIN: ICD-10-CM

## 2025-05-29 DIAGNOSIS — R53.1 WEAKNESS: ICD-10-CM

## 2025-05-29 DIAGNOSIS — M19.011 OSTEOARTHRITIS OF RIGHT GLENOHUMERAL JOINT: Primary | ICD-10-CM

## 2025-05-29 PROCEDURE — 97112 NEUROMUSCULAR REEDUCATION: CPT

## 2025-05-29 PROCEDURE — 97140 MANUAL THERAPY 1/> REGIONS: CPT

## 2025-05-29 PROCEDURE — 97110 THERAPEUTIC EXERCISES: CPT

## 2025-05-29 NOTE — PROGRESS NOTES
Outpatient Rehab    Physical Therapy Visit    Patient Name: Rebecca Johnston Zollinger  MRN: [id number]  YOB: 1955  Encounter Date: 5/29/2025    Therapy Diagnosis:   Encounter Diagnoses   Name Primary?    Osteoarthritis of right glenohumeral joint Yes    Chronic right shoulder pain     Weakness     Decreased range of motion of right shoulder            Physician: Emanuel Aguilar MD    Physician Orders: Eval and Treat  Medical Diagnosis: Osteoarthritis of right glenohumeral joint    Visit # / Visits Authorized:  13 / 20  Insurance Authorization Period: 1/1/2025 to 12/31/2025  Date of Evaluation: 4/2/2025  Plan of Care Certification: 4/2/2025 to 7/2/2025      PT/PTA:     Number of PTA visits since last PT visit:   Time In: 0800   Time Out: 0859  Total Time: 59   Total Billable Time: 59    FOTO:  Intake Score:  %  Survey Score 1:  %  Survey Score 2:  %         Subjective   When she did the arm bike she had pain the rest of the day and couldn't do any exercises the rest of that day..         Objective            Treatment:      THERAPEUTIC EXERCISES to develop strength, endurance, ROM, and flexibility, including:     Pulleys x3' flex/abd  Table slides fwd x10 and lat x10, 5s  No money s/ band 2x10  Seated chest press 2x10 with ball  Seated T-bar assist flex 14x   Standing AROM flex, scap, abd 2x10 each (held abd today)   Step outs ER/IR 2x10 each green theraband      MANUAL THERAPY TECHNIQUES were applied to Right shoulder :   (R) shld PROM per protocol  Static cupping to levator scapulae and upper trap - NT  STM and static cupping (R) biceps   Gentle sup GHJ glides      NEUROMUSCULAR RE-EDUCATION ACTIVITIES to improve Coordination, Proprioception, Posture, and Motor Control.  The following were included:(N  Shoulder iso 2x10 each   - flex   - ext  SAPD 2x10 GTB  Flex ->abd on elevated EOM 2x10      Time Entry(in minutes):  Manual Therapy Time Entry: 15  Neuromuscular Re-Education Time Entry:  12  Therapeutic Exercise Time Entry: 30    Assessment & Plan   Assessment: Progressed abduction activity tolerance today with flex > abd with good response.  Evaluation/Treatment Tolerance: Patient tolerated treatment well    Patient will continue to benefit from skilled outpatient physical therapy to address the deficits listed in the problem list box on initial evaluation, provide pt/family education and to maximize pt's level of independence in the home and community environment.     Patient's spiritual, cultural, and educational needs considered and patient agreeable to plan of care and goals.           Plan: Monitor response and progress as tolerated.    Goals:   Active       LTG       Pt will improve FOTO score to </=62% limited to decrease perceived limitation with carrying, moving, and handling objects. (Progressing)       Start:  04/02/25    Expected End:  07/02/25            Pt will increase R shoulder AROM to WFL with in protocol in all planes to improve functional use of R RUE. (Progressing)       Start:  04/02/25    Expected End:  07/02/25            Pt will improve R shoulder MMTs by 1 grade to promote equal use of B UEs in performing functional tasks. (Progressing)       Start:  04/02/25    Expected End:  07/02/25            Pt will lift 20 lb objects without pain to promote functional QOL.  (Progressing)       Start:  04/02/25    Expected End:  07/02/25            Pt to report pain </= 2/10 with ADLs and IADLs using R UE to improve functional QOL.  (Progressing)       Start:  04/02/25    Expected End:  07/02/25               STG       Pt will be independent with HEP to supplement PT in improving functional use of R UE.  (Progressing)       Start:  04/02/25    Expected End:  05/02/25            Pt will increase pain free R shoulder elevation AROM to >/= 90 deg to improve  functional mobility of UE (Progressing)       Start:  04/02/25    Expected End:  05/02/25            Pt will increase shoulder ER  PROM in 30 deg abduction to >/=45 deg to improve functional mobility of UE (Progressing)       Start:  04/02/25    Expected End:  05/02/25            Pt will improve R shoulder MMTs to 3+/5 to promote equal use of B UEs in performing functional tasks. (Progressing)       Start:  04/02/25    Expected End:  05/02/25                  Guru Milian, PT, DPT

## 2025-06-05 ENCOUNTER — CLINICAL SUPPORT (OUTPATIENT)
Dept: REHABILITATION | Facility: HOSPITAL | Age: 70
End: 2025-06-05
Payer: MEDICARE

## 2025-06-05 DIAGNOSIS — R53.1 WEAKNESS: ICD-10-CM

## 2025-06-05 DIAGNOSIS — G89.29 CHRONIC RIGHT SHOULDER PAIN: ICD-10-CM

## 2025-06-05 DIAGNOSIS — M19.011 OSTEOARTHRITIS OF RIGHT GLENOHUMERAL JOINT: Primary | ICD-10-CM

## 2025-06-05 DIAGNOSIS — M25.611 DECREASED RANGE OF MOTION OF RIGHT SHOULDER: ICD-10-CM

## 2025-06-05 DIAGNOSIS — M25.511 CHRONIC RIGHT SHOULDER PAIN: ICD-10-CM

## 2025-06-05 PROCEDURE — 97110 THERAPEUTIC EXERCISES: CPT | Mod: CQ

## 2025-06-05 PROCEDURE — 97112 NEUROMUSCULAR REEDUCATION: CPT | Mod: CQ

## 2025-06-09 ENCOUNTER — CLINICAL SUPPORT (OUTPATIENT)
Dept: REHABILITATION | Facility: HOSPITAL | Age: 70
End: 2025-06-09
Payer: MEDICARE

## 2025-06-09 ENCOUNTER — PATIENT MESSAGE (OUTPATIENT)
Dept: SPORTS MEDICINE | Facility: CLINIC | Age: 70
End: 2025-06-09
Payer: MEDICARE

## 2025-06-09 DIAGNOSIS — G89.29 CHRONIC RIGHT SHOULDER PAIN: ICD-10-CM

## 2025-06-09 DIAGNOSIS — M25.511 CHRONIC RIGHT SHOULDER PAIN: ICD-10-CM

## 2025-06-09 DIAGNOSIS — M25.611 DECREASED RANGE OF MOTION OF RIGHT SHOULDER: ICD-10-CM

## 2025-06-09 DIAGNOSIS — R53.1 WEAKNESS: ICD-10-CM

## 2025-06-09 DIAGNOSIS — M19.011 OSTEOARTHRITIS OF RIGHT GLENOHUMERAL JOINT: Primary | ICD-10-CM

## 2025-06-09 PROCEDURE — 97110 THERAPEUTIC EXERCISES: CPT | Mod: CQ

## 2025-06-09 PROCEDURE — 97140 MANUAL THERAPY 1/> REGIONS: CPT | Mod: CQ

## 2025-06-09 PROCEDURE — 97112 NEUROMUSCULAR REEDUCATION: CPT | Mod: CQ

## 2025-06-09 NOTE — PROGRESS NOTES
Outpatient Rehab    Physical Therapy Visit    Patient Name: Rebecca Johnston Zollinger  MRN: [id number]  YOB: 1955  Encounter Date: 2025    Therapy Diagnosis:   Encounter Diagnoses   Name Primary?    Osteoarthritis of right glenohumeral joint Yes    Chronic right shoulder pain     Weakness     Decreased range of motion of right shoulder            Physician: Emanuel Aguilar MD    Physician Orders: Eval and Treat  Medical Diagnosis: Osteoarthritis of right glenohumeral joint    Visit # / Visits Authorized:  15 / 20  Insurance Authorization Period: 2025 to 2025  Date of Evaluation: 2025  Plan of Care Certification: 2025 to 2025      PT/PTA: PTA   Number of PTA visits since last PT visit:2  Time In: 1230   Time Out: 1326  Total Time: 56   Total Billable Time: 56    FOTO:  Intake Score:  %  Survey Score 1:  %  Survey Score 2:  %         Subjective   Pt reports soreness t/o R shoulder, mostly because she cleaned out her fairy garden over the weekend..  Pain reported as 0/10.      Objective            Treatment:      THERAPEUTIC EXERCISES to develop strength, endurance, ROM, and flexibility, includin    Pulleys x3' flex/abd  Table slides fwd x10 and lat x10, 5s  No money s/ band 2x10  Seated chest press 2x10 with ball  Seated T-bar assist flex 18x   Standing AROM flex, scap, abd 2x10 each (held abd today)   Step outs ER/IR 2x10 each green theraband      MANUAL THERAPY TECHNIQUES were applied to Right shoulder : 10 min  (R) shld PROM per protocol  Static cupping to levator scapulae and upper trap - NT  STM and static cupping (R) biceps   Gentle sup GHJ glides      NEUROMUSCULAR RE-EDUCATION ACTIVITIES to improve Coordination, Proprioception, Posture, and Motor Control.  The following were included: 15 min  Shoulder iso 2x10 each   - flex   - ext  SAPD 2x10 GTB  Flex ->abd on elevated EOM 2x10  Supine PNF D2/D1     Time Entry(in minutes):       Assessment & Plan    Assessment: Pt did well with newly added supine UE PNF in order to improve functional mobility and return to sport .  Evaluation/Treatment Tolerance: Patient tolerated treatment well    Patient will continue to benefit from skilled outpatient physical therapy to address the deficits listed in the problem list box on initial evaluation, provide pt/family education and to maximize pt's level of independence in the home and community environment.     Patient's spiritual, cultural, and educational needs considered and patient agreeable to plan of care and goals.           Plan: PT/PTA met face to face to discuss pt's treatment plan and progress towards established goals. Pt will be seen by a physical therapist minimally every 6th visit or every 30 days. Continue POC.    Goals:   Active       LTG       Pt will improve FOTO score to </=62% limited to decrease perceived limitation with carrying, moving, and handling objects. (Progressing)       Start:  04/02/25    Expected End:  07/02/25            Pt will increase R shoulder AROM to WFL with in protocol in all planes to improve functional use of R RUE. (Progressing)       Start:  04/02/25    Expected End:  07/02/25            Pt will improve R shoulder MMTs by 1 grade to promote equal use of B UEs in performing functional tasks. (Progressing)       Start:  04/02/25    Expected End:  07/02/25            Pt will lift 20 lb objects without pain to promote functional QOL.  (Progressing)       Start:  04/02/25    Expected End:  07/02/25            Pt to report pain </= 2/10 with ADLs and IADLs using R UE to improve functional QOL.  (Progressing)       Start:  04/02/25    Expected End:  07/02/25               STG       Pt will be independent with HEP to supplement PT in improving functional use of R UE.  (Progressing)       Start:  04/02/25    Expected End:  05/02/25            Pt will increase pain free R shoulder elevation AROM to >/= 90 deg to improve  functional mobility  of UE (Progressing)       Start:  04/02/25    Expected End:  05/02/25            Pt will increase shoulder ER PROM in 30 deg abduction to >/=45 deg to improve functional mobility of UE (Progressing)       Start:  04/02/25    Expected End:  05/02/25            Pt will improve R shoulder MMTs to 3+/5 to promote equal use of B UEs in performing functional tasks. (Progressing)       Start:  04/02/25    Expected End:  05/02/25                  Jose G Blake PTA

## 2025-06-10 ENCOUNTER — PATIENT MESSAGE (OUTPATIENT)
Dept: SPORTS MEDICINE | Facility: CLINIC | Age: 70
End: 2025-06-10
Payer: MEDICARE

## 2025-06-11 ENCOUNTER — CLINICAL SUPPORT (OUTPATIENT)
Dept: REHABILITATION | Facility: HOSPITAL | Age: 70
End: 2025-06-11
Payer: MEDICARE

## 2025-06-11 DIAGNOSIS — G89.29 CHRONIC RIGHT SHOULDER PAIN: ICD-10-CM

## 2025-06-11 DIAGNOSIS — M25.611 DECREASED RANGE OF MOTION OF RIGHT SHOULDER: ICD-10-CM

## 2025-06-11 DIAGNOSIS — R53.1 WEAKNESS: ICD-10-CM

## 2025-06-11 DIAGNOSIS — M25.511 CHRONIC RIGHT SHOULDER PAIN: ICD-10-CM

## 2025-06-11 DIAGNOSIS — M19.011 OSTEOARTHRITIS OF RIGHT GLENOHUMERAL JOINT: Primary | ICD-10-CM

## 2025-06-11 PROCEDURE — 97112 NEUROMUSCULAR REEDUCATION: CPT

## 2025-06-11 PROCEDURE — 97110 THERAPEUTIC EXERCISES: CPT

## 2025-06-11 PROCEDURE — 97530 THERAPEUTIC ACTIVITIES: CPT

## 2025-06-11 NOTE — PROGRESS NOTES
Outpatient Rehab    Physical Therapy Visit    Patient Name: Rebecca Johnston Zollinger  MRN: [id number]  YOB: 1955  Encounter Date: 6/11/2025    Therapy Diagnosis:   Encounter Diagnoses   Name Primary?    Osteoarthritis of right glenohumeral joint Yes    Chronic right shoulder pain     Weakness     Decreased range of motion of right shoulder              Physician: Emanuel Aguilar MD    Physician Orders: Eval and Treat  Medical Diagnosis: Osteoarthritis of right glenohumeral joint    Visit # / Visits Authorized:  16 / 20  Insurance Authorization Period: 1/1/2025 to 12/31/2025  Date of Evaluation: 4/2/2025  Plan of Care Certification: 4/2/2025 to 7/2/2025      PT/PTA:     Number of PTA visits since last PT visit:   Time In: 1155   Time Out: 1255  Total Time: 60   Total Billable Time: 50 (cold pack time not billed)    FOTO:  Intake Score:  %  Survey Score 1:  %  Survey Score 2:  %         Subjective   She was a little sore after last time but not too much..  Pain reported as 0/10.      Objective            Treatment:      THERAPEUTIC EXERCISES to develop strength, endurance, ROM, and flexibility, including:    Pulleys x3' flex/abd  Table slides fwd x10 and lat x10, 5s  No money w/ YTB 2x10  Seated serratus punch 2x10  Seated T-bar assist flex x20, x10 scap  ER 2x10 RTB  IR 2x10 GTB    MANUAL THERAPY TECHNIQUES were applied to Right shoulder :   (R) shld PROM per protocol  Static cupping to levator scapulae and upper trap - NT  STM and static cupping (R) biceps - NT  Gentle sup GHJ glides - NT      NEUROMUSCULAR RE-EDUCATION ACTIVITIES to improve Coordination, Proprioception, Posture, and Motor Control.  The following were included:   SAPD 2x10 GTB  Flex ->abd on elevated EOM 2x10  Supine PNF D2/D1 x10 each      therapeutic activities to improve functional performance, including:  Cabinet lifts x10 flex, x10 scaption  Rows blue theraband 3x10      Time Entry(in minutes):  Hot/Cold Pack Time Entry:  10  Manual Therapy Time Entry: 10  Neuromuscular Re-Education Time Entry: 10  Therapeutic Activity Time Entry: 10  Therapeutic Exercise Time Entry: 20    Assessment & Plan   Assessment: Significant progressions made today with good response and no adverse effects.  Evaluation/Treatment Tolerance: Patient tolerated treatment well    Patient will continue to benefit from skilled outpatient physical therapy to address the deficits listed in the problem list box on initial evaluation, provide pt/family education and to maximize pt's level of independence in the home and community environment.     Patient's spiritual, cultural, and educational needs considered and patient agreeable to plan of care and goals.           Plan: PT/PTA met face to face to discuss pt's treatment plan and progress towards established goals. Pt will be seen by a physical therapist minimally every 6th visit or every 30 days. Continue POC.    Goals:   Active       LTG       Pt will improve FOTO score to </=62% limited to decrease perceived limitation with carrying, moving, and handling objects. (Progressing)       Start:  04/02/25    Expected End:  07/02/25            Pt will increase R shoulder AROM to WFL with in protocol in all planes to improve functional use of R RUE. (Progressing)       Start:  04/02/25    Expected End:  07/02/25            Pt will improve R shoulder MMTs by 1 grade to promote equal use of B UEs in performing functional tasks. (Progressing)       Start:  04/02/25    Expected End:  07/02/25            Pt will lift 20 lb objects without pain to promote functional QOL.  (Progressing)       Start:  04/02/25    Expected End:  07/02/25            Pt to report pain </= 2/10 with ADLs and IADLs using R UE to improve functional QOL.  (Progressing)       Start:  04/02/25    Expected End:  07/02/25               STG       Pt will be independent with HEP to supplement PT in improving functional use of R UE.  (Progressing)       Start:   04/02/25    Expected End:  05/02/25            Pt will increase pain free R shoulder elevation AROM to >/= 90 deg to improve  functional mobility of UE (Progressing)       Start:  04/02/25    Expected End:  05/02/25            Pt will increase shoulder ER PROM in 30 deg abduction to >/=45 deg to improve functional mobility of UE (Progressing)       Start:  04/02/25    Expected End:  05/02/25            Pt will improve R shoulder MMTs to 3+/5 to promote equal use of B UEs in performing functional tasks. (Progressing)       Start:  04/02/25    Expected End:  05/02/25                    Guru Milian, PT, DPT

## 2025-06-17 ENCOUNTER — CLINICAL SUPPORT (OUTPATIENT)
Dept: REHABILITATION | Facility: HOSPITAL | Age: 70
End: 2025-06-17
Payer: MEDICARE

## 2025-06-17 DIAGNOSIS — R53.1 WEAKNESS: ICD-10-CM

## 2025-06-17 DIAGNOSIS — M25.511 CHRONIC RIGHT SHOULDER PAIN: ICD-10-CM

## 2025-06-17 DIAGNOSIS — M19.011 OSTEOARTHRITIS OF RIGHT GLENOHUMERAL JOINT: Primary | ICD-10-CM

## 2025-06-17 DIAGNOSIS — M25.611 DECREASED RANGE OF MOTION OF RIGHT SHOULDER: ICD-10-CM

## 2025-06-17 DIAGNOSIS — G89.29 CHRONIC RIGHT SHOULDER PAIN: ICD-10-CM

## 2025-06-17 PROCEDURE — 97530 THERAPEUTIC ACTIVITIES: CPT

## 2025-06-17 PROCEDURE — 97112 NEUROMUSCULAR REEDUCATION: CPT

## 2025-06-17 PROCEDURE — 97110 THERAPEUTIC EXERCISES: CPT

## 2025-06-17 NOTE — PROGRESS NOTES
Outpatient Rehab    Physical Therapy Progress Note : Updated Plan of Care    Patient Name: Rebecca Johnston Zollinger  MRN: [id number]  YOB: 1955  Encounter Date: 6/17/2025    Therapy Diagnosis:   Encounter Diagnoses   Name Primary?    Osteoarthritis of right glenohumeral joint Yes    Chronic right shoulder pain     Weakness     Decreased range of motion of right shoulder      Physician: Emanuel Aguilar MD    Physician Orders: Eval and Treat  Medical Diagnosis: Osteoarthritis of right glenohumeral joint  Surgical Diagnosis: Reverse TSA   Surgical Date: 3/19/2025  Days Since Last Surgery: 90    Visit # / Visits Authorized:  17 / 30  Insurance Authorization Period: 1/1/2025 to 12/31/2025  Date of Evaluation: 3/18/2025   Plan of Care Certification: 4/2/2025 to 7/2/2025      PT/PTA:     Number of PTA visits since last PT visit:   Time In: 1101   Time Out: 1157  Total Time (in minutes): 56   Total Billable Time (in minutes): 51    FOTO:  Intake Score:  %  Survey Score 2:  %  Survey Score 3:  %    Precautions:       Subjective   She hurt britta L lower back and that has been giving her a lot of trouble this morning. She started having tightness in LS area again today with ecc pulleys. Overall she feels like the arm is slowly getting better though she still has been very limited in functional activities and has pain in anterior shoulder..  Pain reported as 3/10.      Objective            Shoulder Right Right Right Left Left Pain/Dysfunction with Movement    AROM PROM MMT AROM MMT    Flexion 133! 146 3+/5! WNL 5/5    Extension 30 NT 3+/5 WNL 5/5    Abduction 105! 75! 3/5! WNL 5/5 Most painful MMT and ROM   Adduction NT NT 4-/5 WNL 5/5    Internal rotation sacrum 53 3+/5! Inf bord scap 5/5    External Rotation 55 62 3+/5! WNL 5/5      Elbow Right Right Left Left Pain/Dysfunction with Movement    AROM MMT AROM MMT    Flexion WNL 4/5 WNL 5/5    Extension WNL 4/5! WNL 5/5       Strength: R=60#;   L=61#    Treatment:   THERAPEUTIC EXERCISES to develop strength, endurance, ROM, and flexibility, including:     Pulleys x3' flex/abd  Table slides fwd 2x10 5s with lift  No money w/ YTB 2x10 - NT  Seated serratus punch 2x10  Seated T-bar assist flex x20, x10 scap  ER 2x10 RTB  IR 2x10 green theraband  Upper trap stretch 5x15s  Levator scapulae stretch 5x15s     MANUAL THERAPY TECHNIQUES were applied to Right shoulder :   (R) shld PROM per protocol  Static cupping to levator scapulae and upper trap 5 min (not included in billed time)  STM and static cupping (R) biceps - NT  Gentle sup GHJ glides         NEUROMUSCULAR RE-EDUCATION ACTIVITIES to improve Coordination, Proprioception, Posture, and Motor Control.  The following were included:   SAPD 2x10 GTB  Flex ->abd on elevated EOM 2x10  Supine PNF D2/D1 x10 each    therapeutic activities to improve functional performance, including:  Cabinet lifts x10 flex with 1#, x10 flex without weight, x10 scap un-weighted  Rows blue theraband 3x10      Time Entry(in minutes):  Manual Therapy Time Entry: 10  Neuromuscular Re-Education Time Entry: 10  Therapeutic Activity Time Entry: 8  Therapeutic Exercise Time Entry: 25    Assessment & Plan   Assessment  Xochitl                 Assessment Details: Pt is progressing very well per protocol with significant improvement in AROM and PROM with most impaired motion being abduction. She demonstrates strength that is progressing well per protocol. She continues to be limited by R UE in ADLs and functional activities and would benefit from continued skilled PT services to address impairments, continue to progress per protocol, and improve functional activity tolerance.     Plan  From a physical therapy perspective, the patient would benefit from: Skilled Rehab Services    Planned therapy interventions include: Therapeutic activities, Therapeutic exercise, Neuromuscular re-education, and Manual therapy.    Planned modalities to include:  Cryotherapy (cold pack), Electrical stimulation - passive/unattended, and Thermotherapy (hot pack).        Visit Frequency: 2 times Per Week for 12 Weeks.       This plan was discussed with Patient.   Discussion participants: Agreed Upon Plan of Care             The patient will continue to benefit from skilled outpatient physical therapy in order to address the deficits listed in the problem list on the initial evaluation, provide patient and family education, and maximize the patients level of independence in the home and community environments.     The patient's spiritual, cultural, and educational needs were considered, and the patient is agreeable to the plan of care and goals.           Goals:   Active       LTG       Pt will improve FOTO score to </=62% limited to decrease perceived limitation with carrying, moving, and handling objects. (Ongoing)       Start:  04/02/25    Expected End:  07/02/25            Pt will increase R shoulder AROM to WFL with in protocol in all planes to improve functional use of R RUE. (Progressing)       Start:  04/02/25    Expected End:  07/02/25            Pt will improve R shoulder MMTs by 1 grade to promote equal use of B UEs in performing functional tasks. (Progressing)       Start:  04/02/25    Expected End:  07/02/25            Pt will lift 20 lb objects without pain to promote functional QOL.  (Progressing)       Start:  04/02/25    Expected End:  07/02/25            Pt to report pain </= 2/10 with ADLs and IADLs using R UE to improve functional QOL.  (Progressing)       Start:  04/02/25    Expected End:  07/02/25               STG       Pt will be independent with HEP to supplement PT in improving functional use of R UE.  (Met)       Start:  04/02/25    Expected End:  05/02/25    Resolved:  06/17/25         Pt will increase pain free R shoulder elevation AROM to >/= 90 deg to improve  functional mobility of UE (Met)       Start:  04/02/25    Expected End:  05/02/25     Resolved:  06/17/25         Pt will increase shoulder ER PROM in 30 deg abduction to >/=45 deg to improve functional mobility of UE (Met)       Start:  04/02/25    Expected End:  05/02/25    Resolved:  06/17/25         Pt will improve R shoulder MMTs to 3+/5 to promote equal use of B UEs in performing functional tasks. (Progressing)       Start:  04/02/25    Expected End:  05/02/25                Guru Milian, PT, DPT

## 2025-06-19 ENCOUNTER — CLINICAL SUPPORT (OUTPATIENT)
Dept: REHABILITATION | Facility: HOSPITAL | Age: 70
End: 2025-06-19
Payer: MEDICARE

## 2025-06-19 DIAGNOSIS — M53.84 DECREASED ROM OF THORACIC SPINE: ICD-10-CM

## 2025-06-19 DIAGNOSIS — R29.898 DECREASED ROM OF NECK: Primary | ICD-10-CM

## 2025-06-19 PROCEDURE — 97112 NEUROMUSCULAR REEDUCATION: CPT

## 2025-06-19 PROCEDURE — 97140 MANUAL THERAPY 1/> REGIONS: CPT

## 2025-06-19 PROCEDURE — 97110 THERAPEUTIC EXERCISES: CPT

## 2025-06-19 NOTE — PROGRESS NOTES
Outpatient Rehab    Physical Therapy Visit    Patient Name: Rebecca Johnston Zollinger  MRN: [id number]  YOB: 1955  Encounter Date: 6/19/2025    Therapy Diagnosis:   Encounter Diagnoses   Name Primary?    Decreased ROM of neck Yes    Decreased ROM of thoracic spine      Physician: Emanuel Aguilar MD    Physician Orders: Eval and Treat  Medical Diagnosis: Osteoarthritis of right glenohumeral joint  Surgical Diagnosis: Not applicable for this Episode   Surgical Date: Not applicable for this Episode  Days Since Last Surgery: Not applicable for this Episode    Visit # / Visits Authorized:  18 / 30  Insurance Authorization Period: 1/1/2025 to 12/31/2025  Date of Evaluation: 1/30/2024  Plan of Care Certification:       PT/PTA:     Number of PTA visits since last PT visit:   Time In: 0755   Time Out: 0900  Total Time (in minutes): 65   Total Billable Time (in minutes): 55 (cold pack jeffery no billed)    FOTO:  Intake Score:  %  Survey Score 2:  %  Survey Score 3:  %    Precautions:       Subjective   Her back is still the most painful area. The shoulder is doing okay right now..         Objective          From Visit on 6/17/2025  Shoulder Right Right Right Left Left Pain/Dysfunction with Movement     AROM PROM MMT AROM MMT     Flexion 133! 146 3+/5! WNL 5/5     Extension 30 NT 3+/5 WNL 5/5     Abduction 105! 75! 3/5! WNL 5/5 Most painful MMT and ROM   Adduction NT NT 4-/5 WNL 5/5     Internal rotation sacrum 53 3+/5! Inf bord scap 5/5     External Rotation 55 62 3+/5! WNL 5/5        Elbow Right Right Left Left Pain/Dysfunction with Movement     AROM MMT AROM MMT     Flexion WNL 4/5 WNL 5/5     Extension WNL 4/5! WNL 5/5         Strength: R=60#;  L=61#     Treatment:   THERAPEUTIC EXERCISES to develop strength, endurance, ROM, and flexibility, including:     Pulleys x3' flex/abd/flex ecc  Table slides fwd 2x10 5s with lift  No money w/ YTB 2x10 - NT  Seated serratus punch 2x10  Seated T-bar assist flex  x20, x10 scap  ER 2x10 RTB  IR 2x10 green theraband  Upper trap stretch 5x15s  Levator scapulae stretch 5x15s  Supine abd x10     MANUAL THERAPY TECHNIQUES were applied to Right shoulder :   (R) shld PROM per protocol  Static cupping to levator scapulae and upper trap 5 min (not included in billed time)  STM and static cupping (R) biceps - NT  Gentle sup GHJ glides         NEUROMUSCULAR RE-EDUCATION ACTIVITIES to improve Coordination, Proprioception, Posture, and Motor Control.  The following were included:   SAPD 2x10 GTB  Flex ->abd on elevated EOM 2x10 (only x11 today)  Supine PNF D2/D1 x10 each     therapeutic activities to improve functional performance, including:  Cabinet lifts x10 flex with 1#, x10 flex without weight, x10 scap un-weighted  Rows blue theraband 3x10    Time Entry(in minutes):  Manual Therapy Time Entry: 10  Neuromuscular Re-Education Time Entry: 10  Therapeutic Activity Time Entry: 8  Therapeutic Exercise Time Entry: 26    Assessment & Plan   Assessment: Able to progress abd well in supine position. Plan to progress this as tolerated next session.   Evaluation/Treatment Tolerance: Patient tolerated treatment well    The patient will continue to benefit from skilled outpatient physical therapy in order to address the deficits listed in the problem list on the initial evaluation, provide patient and family education, and maximize the patients level of independence in the home and community environments.     The patient's spiritual, cultural, and educational needs were considered, and the patient is agreeable to the plan of care and goals.           Plan: PT/PTA met face to face to discuss pt's treatment plan and progress towards established goals. Pt will be seen by a physical therapist minimally every 6th visit or every 30 days. Continue POC.    Goals:     Guru Milian, PT, DPT

## 2025-06-24 ENCOUNTER — HOSPITAL ENCOUNTER (OUTPATIENT)
Dept: RADIOLOGY | Facility: HOSPITAL | Age: 70
Discharge: HOME | End: 2025-06-24
Attending: ORTHOPAEDIC SURGERY
Payer: MEDICARE

## 2025-06-24 ENCOUNTER — OFFICE VISIT (OUTPATIENT)
Dept: SPORTS MEDICINE | Facility: CLINIC | Age: 70
End: 2025-06-24
Payer: MEDICARE

## 2025-06-24 ENCOUNTER — CLINICAL SUPPORT (OUTPATIENT)
Dept: REHABILITATION | Facility: HOSPITAL | Age: 70
End: 2025-06-24
Payer: MEDICARE

## 2025-06-24 DIAGNOSIS — M25.511 CHRONIC RIGHT SHOULDER PAIN: ICD-10-CM

## 2025-06-24 DIAGNOSIS — G89.29 CHRONIC RIGHT SHOULDER PAIN: ICD-10-CM

## 2025-06-24 DIAGNOSIS — Z96.611 S/P REVERSE TOTAL SHOULDER ARTHROPLASTY, RIGHT: ICD-10-CM

## 2025-06-24 DIAGNOSIS — M54.50 LOW BACK PAIN: Primary | ICD-10-CM

## 2025-06-24 DIAGNOSIS — M53.84 DECREASED ROM OF THORACIC SPINE: ICD-10-CM

## 2025-06-24 DIAGNOSIS — R53.1 WEAKNESS: ICD-10-CM

## 2025-06-24 DIAGNOSIS — R29.898 DECREASED ROM OF NECK: Primary | ICD-10-CM

## 2025-06-24 DIAGNOSIS — M19.011 OSTEOARTHRITIS OF RIGHT GLENOHUMERAL JOINT: ICD-10-CM

## 2025-06-24 DIAGNOSIS — M25.611 DECREASED RANGE OF MOTION OF RIGHT SHOULDER: ICD-10-CM

## 2025-06-24 PROCEDURE — 73030 X-RAY EXAM OF SHOULDER: CPT | Mod: 26,RT,, | Performed by: RADIOLOGY

## 2025-06-24 PROCEDURE — 97110 THERAPEUTIC EXERCISES: CPT

## 2025-06-24 PROCEDURE — 3044F HG A1C LEVEL LT 7.0%: CPT | Mod: CPTII,S$GLB,, | Performed by: ORTHOPAEDIC SURGERY

## 2025-06-24 PROCEDURE — 99214 OFFICE O/P EST MOD 30 MIN: CPT | Mod: S$GLB,,, | Performed by: ORTHOPAEDIC SURGERY

## 2025-06-24 PROCEDURE — 99999 PR PBB SHADOW E&M-EST. PATIENT-LVL II: CPT | Mod: PBBFAC,,, | Performed by: ORTHOPAEDIC SURGERY

## 2025-06-24 PROCEDURE — 97530 THERAPEUTIC ACTIVITIES: CPT

## 2025-06-24 PROCEDURE — 97112 NEUROMUSCULAR REEDUCATION: CPT

## 2025-06-24 PROCEDURE — 4010F ACE/ARB THERAPY RXD/TAKEN: CPT | Mod: CPTII,S$GLB,, | Performed by: ORTHOPAEDIC SURGERY

## 2025-06-24 PROCEDURE — 73030 X-RAY EXAM OF SHOULDER: CPT | Mod: TC,RT

## 2025-06-24 NOTE — PROGRESS NOTES
CC: Right shoulder post op 12 weeks    Patient is here for her 12 week post op appointment s/p below and is doing well. Patient is scheduled for her initial physical therapy evaluation today at [facility].       DATE OF SURGERY:  3/19/2025      PREOPERATIVE DIAGNOSIS:   1. Right shoulder rotator cuff arthropathy with glenoid bone loss.   2. Right shoulder biceps tendinopathy     POSTOPERATIVE DIAGNOSIS:   1. Right shoulder rotator cuff arthropathy with glenoid bone loss.   2. Right shoulder biceps tendinopathy     PROCEDURE:   1. Right reverse total shoulder arthroplasty (complex with glenoid wedge augmentation)  2. Right shoulder biceps tenodesis     SURGEON: Emanuel Aguilar M.D.     No issues reported    Review of Systems   Constitution: Negative. Negative for chills, fever and night sweats.    Cardiovascular: Negative for chest pain and syncope.   Respiratory: Negative for cough and shortness of breath.    Gastrointestinal: Negative for abdominal pain and bowel incontinence.      PE:    There were no vitals taken for this visit.     Right shoulder    Incision healed  No sign of infection  Swelling resolved  Compartments soft  Neurovascular status intact in extremity    PROM  Forward elevation 110 degrees  External rotation 30 degrees    X-rays right shoulder (04/02/2025):  Status post reverse total shoulder arthroplasty.  Prosthetics appear in good position without any evidence of perihardware fracture/complication.  Soft tissues appear normal.    Assessment:  12 weeks s/p right reverse total shoulder arthroplasty and biceps tenodesis    Plan:  1. Continue PT   2. Referral to Dr. Leonardo Ji pain management for epidural/lumbar injections  3. F/u in 12 weeks for 6 month post op       All questions were answered. Instructed patient to call with questions or concerns in the interim.

## 2025-06-24 NOTE — PROGRESS NOTES
Outpatient Rehab    Physical Therapy Visit    Patient Name: Rebecca Johnston Zollinger  MRN: [id number]  YOB: 1955  Encounter Date: 6/24/2025    Therapy Diagnosis:   Encounter Diagnoses   Name Primary?    Decreased ROM of neck Yes    Decreased ROM of thoracic spine     Osteoarthritis of right glenohumeral joint     Chronic right shoulder pain     Weakness     Decreased range of motion of right shoulder        Physician: Emanuel Aguilar MD    Physician Orders: Eval and Treat  Medical Diagnosis: Osteoarthritis of right glenohumeral joint  Surgical Diagnosis: Reverse TSA   Surgical Date: 3/19/2025  Days Since Last Surgery: 97    Visit # / Visits Authorized:  19 / 30  Insurance Authorization Period: 1/1/2025 to 12/31/2025  Date of Evaluation: 3/18/2025  Plan of Care Certification: 6/17/2025 to 9/17/2025      PT/PTA:     Number of PTA visits since last PT visit:   Time In: 0800   Time Out: 0858  Total Time (in minutes): 58   Total Billable Time (in minutes): 48 (some time not billed d/t 1:1 guidelines)    FOTO:  Intake Score:  %  Survey Score 2:  %  Survey Score 3:  %    Precautions:       Subjective   Pt states that she is feeling good this morning. She had some pain when she woke up, but feels better now..  Pain reported as 0/10.      Objective          From Visit on 6/17/2025  Shoulder Right Right Right Left Left Pain/Dysfunction with Movement     AROM PROM MMT AROM MMT     Flexion 133! 146 3+/5! WNL 5/5     Extension 30 NT 3+/5 WNL 5/5     Abduction 105! 75! 3/5! WNL 5/5 Most painful MMT and ROM   Adduction NT NT 4-/5 WNL 5/5     Internal rotation sacrum 53 3+/5! Inf bord scap 5/5     External Rotation 55 62 3+/5! WNL 5/5        Elbow Right Right Left Left Pain/Dysfunction with Movement     AROM MMT AROM MMT     Flexion WNL 4/5 WNL 5/5     Extension WNL 4/5! WNL 5/5         Strength: R=60#;  L=61#     Treatment:   THERAPEUTIC EXERCISES to develop strength, endurance, ROM, and flexibility,  including:     Pulleys x3' flex/abd/flex ecc  Table slides fwd 2x10 5s with lift  No money w/ YTB 2x10 - NT  Seated serratus punch 2x10  ER 2x10 RTB  IR 2x10 green theraband  Upper trap stretch 5x15s  Levator scapulae stretch 5x15s  Supine abd 2x10 2#     MANUAL THERAPY TECHNIQUES were applied to Right shoulder :   (R) shld PROM per protocol - abd only  Static cupping to levator scapulae and upper trap 3 min (not included in billed time)  Gentle sup GHJ glides         NEUROMUSCULAR RE-EDUCATION ACTIVITIES to improve Coordination, Proprioception, Posture, and Motor Control.  The following were included:   SAPD 2x10 GTB  Flex (up) ->abd (down) AROM 2x10   Supine PNF D2/D1 x10 each 1#     therapeutic activities to improve functional performance, including:  Cabinet lifts x10 flex with 1#, x10 flex without weight, x10 scap un-weighted  Rows blue theraband 3x10  Education on return to sport in the future    Time Entry(in minutes):  Manual Therapy Time Entry: 10  Neuromuscular Re-Education Time Entry: 10  Therapeutic Activity Time Entry: 8  Therapeutic Exercise Time Entry: 23    Assessment & Plan   Assessment: Pt is progressing very well with PT. Abduction continues to be her most limited plane but was able to tolerate progressions well today without adverse effects.        The patient will continue to benefit from skilled outpatient physical therapy in order to address the deficits listed in the problem list on the initial evaluation, provide patient and family education, and maximize the patients level of independence in the home and community environments.     The patient's spiritual, cultural, and educational needs were considered, and the patient is agreeable to the plan of care and goals.           Plan: PT/PTA met face to face to discuss pt's treatment plan and progress towards established goals. Pt will be seen by a physical therapist minimally every 6th visit or every 30 days. Continue POC.    Goals:   Active        LTG       Pt will improve FOTO score to </=62% limited to decrease perceived limitation with carrying, moving, and handling objects. (Progressing)       Start:  04/02/25    Expected End:  07/02/25            Pt will increase R shoulder AROM to WFL with in protocol in all planes to improve functional use of R RUE. (Progressing)       Start:  04/02/25    Expected End:  07/02/25            Pt will improve R shoulder MMTs by 1 grade to promote equal use of B UEs in performing functional tasks. (Progressing)       Start:  04/02/25    Expected End:  07/02/25            Pt will lift 20 lb objects without pain to promote functional QOL.  (Progressing)       Start:  04/02/25    Expected End:  07/02/25            Pt to report pain </= 2/10 with ADLs and IADLs using R UE to improve functional QOL.  (Progressing)       Start:  04/02/25    Expected End:  07/02/25              Resolved       STG       Pt will be independent with HEP to supplement PT in improving functional use of R UE.  (Met)       Start:  04/02/25    Expected End:  05/02/25    Resolved:  06/17/25         Pt will increase pain free R shoulder elevation AROM to >/= 90 deg to improve  functional mobility of UE (Met)       Start:  04/02/25    Expected End:  05/02/25    Resolved:  06/17/25         Pt will increase shoulder ER PROM in 30 deg abduction to >/=45 deg to improve functional mobility of UE (Met)       Start:  04/02/25    Expected End:  05/02/25    Resolved:  06/17/25         Pt will improve R shoulder MMTs to 3+/5 to promote equal use of B UEs in performing functional tasks. (Met)       Start:  04/02/25    Expected End:  05/02/25    Resolved:  06/24/25             Guru Milian, PT, DPT

## 2025-06-26 ENCOUNTER — CLINICAL SUPPORT (OUTPATIENT)
Dept: REHABILITATION | Facility: HOSPITAL | Age: 70
End: 2025-06-26
Payer: MEDICARE

## 2025-06-26 DIAGNOSIS — G89.29 CHRONIC RIGHT SHOULDER PAIN: ICD-10-CM

## 2025-06-26 DIAGNOSIS — M19.011 OSTEOARTHRITIS OF RIGHT GLENOHUMERAL JOINT: Primary | ICD-10-CM

## 2025-06-26 DIAGNOSIS — M25.511 CHRONIC RIGHT SHOULDER PAIN: ICD-10-CM

## 2025-06-26 DIAGNOSIS — R53.1 WEAKNESS: ICD-10-CM

## 2025-06-26 DIAGNOSIS — M25.611 DECREASED RANGE OF MOTION OF RIGHT SHOULDER: ICD-10-CM

## 2025-06-26 PROCEDURE — 97112 NEUROMUSCULAR REEDUCATION: CPT

## 2025-06-26 PROCEDURE — 97110 THERAPEUTIC EXERCISES: CPT

## 2025-06-26 NOTE — PROGRESS NOTES
Outpatient Rehab    Physical Therapy Visit    Patient Name: Rebecca Johnston Zollinger  MRN: [id number]  YOB: 1955  Encounter Date: 6/26/2025    Therapy Diagnosis:   Encounter Diagnoses   Name Primary?    Osteoarthritis of right glenohumeral joint Yes    Chronic right shoulder pain     Weakness     Decreased range of motion of right shoulder        Physician: Emanuel Aguilar MD    Physician Orders: Eval and Treat  Medical Diagnosis: Osteoarthritis of right glenohumeral joint  Surgical Diagnosis: Reverse TSA   Surgical Date: 3/19/2025  Days Since Last Surgery: 99    Visit # / Visits Authorized:  20 / 30  Insurance Authorization Period: 1/1/2025 to 12/31/2025  Date of Evaluation: 3/18/2025  Plan of Care Certification: 6/17/2025 to 9/17/2025      PT/PTA:     Number of PTA visits since last PT visit:   Time In: 0800   Time Out: 0857  Total Time (in minutes): 57   Total Billable Time (in minutes): 52 (cupping time not billed)    FOTO:  Intake Score:  %  Survey Score 2:  %  Survey Score 3:  %    Precautions:       Subjective   She saw the doctor who said the shoulder is looking good. She has no new complaints..  Pain reported as 2/10.      Objective          From Visit on 6/17/2025  Shoulder Right Right Right Left Left Pain/Dysfunction with Movement     AROM PROM MMT AROM MMT     Flexion 133! 146 3+/5! WNL 5/5     Extension 30 NT 3+/5 WNL 5/5     Abduction 105! 75! 3/5! WNL 5/5 Most painful MMT and ROM   Adduction NT NT 4-/5 WNL 5/5     Internal rotation sacrum 53 3+/5! Inf bord scap 5/5     External Rotation 55 62 3+/5! WNL 5/5        Elbow Right Right Left Left Pain/Dysfunction with Movement     AROM MMT AROM MMT     Flexion WNL 4/5 WNL 5/5     Extension WNL 4/5! WNL 5/5         Strength: R=60#;  L=61#     Treatment:   THERAPEUTIC EXERCISES to develop strength, endurance, ROM, and flexibility, including:     Pulleys x3' flex/abd/flex ecc  Table slides fwd 2x10 5s with lift  No money w/ RTB 2x10    Seated serratus punch 2x10  ER 2x10 red theraband seated  IR 3x10 green theraband seated  Upper trap stretch 5x15s  Levator scapulae stretch 5x15s  SL abd 2x10 2#   Supine abd x10      MANUAL THERAPY TECHNIQUES were applied to Right shoulder :   (R) shld PROM per protocol - abd only  Static cupping to levator scapulae and upper trap 3 min (not included in billed time)  Gentle sup GHJ glides         NEUROMUSCULAR RE-EDUCATION ACTIVITIES to improve Coordination, Proprioception, Posture, and Motor Control.  The following were included:   SAPD 2x10 green theraband Seated  Flex (up) ->abd (down) AROM 2x10   Supine PNF D2/D1 2x10 each 1#     therapeutic activities to improve functional performance, including:  Cabinet lifts x10 flex with 1#, x10 flex without weight, x10 scap un-weighted  Rows blue theraband 3x10 seated  Education on return to sport in the future    Time Entry(in minutes):  Manual Therapy Time Entry: 5  Neuromuscular Re-Education Time Entry: 10  Therapeutic Activity Time Entry: 8  Therapeutic Exercise Time Entry: 29    Assessment & Plan   Assessment: Pt continues with limitation d/t hypertonicity of levator scapulae and slight limitation d/t LBP so modified standing activities to seated when able.   Evaluation/Treatment Tolerance: Patient tolerated treatment well    The patient will continue to benefit from skilled outpatient physical therapy in order to address the deficits listed in the problem list on the initial evaluation, provide patient and family education, and maximize the patients level of independence in the home and community environments.     The patient's spiritual, cultural, and educational needs were considered, and the patient is agreeable to the plan of care and goals.           Plan: PT/PTA met face to face to discuss pt's treatment plan and progress towards established goals. Pt will be seen by a physical therapist minimally every 6th visit or every 30 days. Continue POC.    Goals:    Active       LTG       Pt will improve FOTO score to </=62% limited to decrease perceived limitation with carrying, moving, and handling objects. (Progressing)       Start:  04/02/25    Expected End:  07/02/25            Pt will increase R shoulder AROM to WFL with in protocol in all planes to improve functional use of R RUE. (Progressing)       Start:  04/02/25    Expected End:  07/02/25            Pt will improve R shoulder MMTs by 1 grade to promote equal use of B UEs in performing functional tasks. (Progressing)       Start:  04/02/25    Expected End:  07/02/25            Pt will lift 20 lb objects without pain to promote functional QOL.  (Progressing)       Start:  04/02/25    Expected End:  07/02/25            Pt to report pain </= 2/10 with ADLs and IADLs using R UE to improve functional QOL.  (Progressing)       Start:  04/02/25    Expected End:  07/02/25              Resolved       STG       Pt will be independent with HEP to supplement PT in improving functional use of R UE.  (Met)       Start:  04/02/25    Expected End:  05/02/25    Resolved:  06/17/25         Pt will increase pain free R shoulder elevation AROM to >/= 90 deg to improve  functional mobility of UE (Met)       Start:  04/02/25    Expected End:  05/02/25    Resolved:  06/17/25         Pt will increase shoulder ER PROM in 30 deg abduction to >/=45 deg to improve functional mobility of UE (Met)       Start:  04/02/25    Expected End:  05/02/25    Resolved:  06/17/25         Pt will improve R shoulder MMTs to 3+/5 to promote equal use of B UEs in performing functional tasks. (Met)       Start:  04/02/25    Expected End:  05/02/25    Resolved:  06/24/25             Guru Milian, PT, DPT

## 2025-07-01 ENCOUNTER — CLINICAL SUPPORT (OUTPATIENT)
Dept: REHABILITATION | Facility: HOSPITAL | Age: 70
End: 2025-07-01
Payer: MEDICARE

## 2025-07-01 ENCOUNTER — OFFICE VISIT (OUTPATIENT)
Dept: PAIN MEDICINE | Facility: CLINIC | Age: 70
End: 2025-07-01
Attending: ANESTHESIOLOGY
Payer: MEDICARE

## 2025-07-01 VITALS
SYSTOLIC BLOOD PRESSURE: 126 MMHG | TEMPERATURE: 97 F | WEIGHT: 163.38 LBS | OXYGEN SATURATION: 99 % | DIASTOLIC BLOOD PRESSURE: 84 MMHG | HEART RATE: 65 BPM | HEIGHT: 64 IN | BODY MASS INDEX: 27.89 KG/M2

## 2025-07-01 DIAGNOSIS — R53.1 WEAKNESS: ICD-10-CM

## 2025-07-01 DIAGNOSIS — G89.29 CHRONIC RIGHT SHOULDER PAIN: ICD-10-CM

## 2025-07-01 DIAGNOSIS — M25.511 CHRONIC RIGHT SHOULDER PAIN: ICD-10-CM

## 2025-07-01 DIAGNOSIS — M70.62 TROCHANTERIC BURSITIS OF LEFT HIP: ICD-10-CM

## 2025-07-01 DIAGNOSIS — M54.14 THORACIC RADICULOPATHY: Primary | ICD-10-CM

## 2025-07-01 DIAGNOSIS — M19.011 OSTEOARTHRITIS OF RIGHT GLENOHUMERAL JOINT: Primary | ICD-10-CM

## 2025-07-01 DIAGNOSIS — M25.611 DECREASED RANGE OF MOTION OF RIGHT SHOULDER: ICD-10-CM

## 2025-07-01 DIAGNOSIS — M25.551 RIGHT HIP PAIN: ICD-10-CM

## 2025-07-01 PROCEDURE — 3044F HG A1C LEVEL LT 7.0%: CPT | Mod: CPTII,S$GLB,, | Performed by: ANESTHESIOLOGY

## 2025-07-01 PROCEDURE — 3074F SYST BP LT 130 MM HG: CPT | Mod: CPTII,S$GLB,, | Performed by: ANESTHESIOLOGY

## 2025-07-01 PROCEDURE — 1125F AMNT PAIN NOTED PAIN PRSNT: CPT | Mod: CPTII,S$GLB,, | Performed by: ANESTHESIOLOGY

## 2025-07-01 PROCEDURE — 1101F PT FALLS ASSESS-DOCD LE1/YR: CPT | Mod: CPTII,S$GLB,, | Performed by: ANESTHESIOLOGY

## 2025-07-01 PROCEDURE — 3008F BODY MASS INDEX DOCD: CPT | Mod: CPTII,S$GLB,, | Performed by: ANESTHESIOLOGY

## 2025-07-01 PROCEDURE — 99214 OFFICE O/P EST MOD 30 MIN: CPT | Mod: GC,S$GLB,, | Performed by: ANESTHESIOLOGY

## 2025-07-01 PROCEDURE — 97530 THERAPEUTIC ACTIVITIES: CPT

## 2025-07-01 PROCEDURE — 4010F ACE/ARB THERAPY RXD/TAKEN: CPT | Mod: CPTII,S$GLB,, | Performed by: ANESTHESIOLOGY

## 2025-07-01 PROCEDURE — 1160F RVW MEDS BY RX/DR IN RCRD: CPT | Mod: CPTII,S$GLB,, | Performed by: ANESTHESIOLOGY

## 2025-07-01 PROCEDURE — 99999 PR PBB SHADOW E&M-EST. PATIENT-LVL IV: CPT | Mod: PBBFAC,,, | Performed by: ANESTHESIOLOGY

## 2025-07-01 PROCEDURE — 97112 NEUROMUSCULAR REEDUCATION: CPT

## 2025-07-01 PROCEDURE — 1159F MED LIST DOCD IN RCRD: CPT | Mod: CPTII,S$GLB,, | Performed by: ANESTHESIOLOGY

## 2025-07-01 PROCEDURE — 3079F DIAST BP 80-89 MM HG: CPT | Mod: CPTII,S$GLB,, | Performed by: ANESTHESIOLOGY

## 2025-07-01 PROCEDURE — 3288F FALL RISK ASSESSMENT DOCD: CPT | Mod: CPTII,S$GLB,, | Performed by: ANESTHESIOLOGY

## 2025-07-01 NOTE — PROGRESS NOTES
Outpatient Rehab    Physical Therapy Visit    Patient Name: Rebecca Johnston Zollinger  MRN: [id number]  YOB: 1955  Encounter Date: 7/1/2025    Therapy Diagnosis:   Encounter Diagnoses   Name Primary?    Osteoarthritis of right glenohumeral joint Yes    Chronic right shoulder pain     Weakness     Decreased range of motion of right shoulder        Physician: Emanuel Aguilar MD    Physician Orders: Eval and Treat  Medical Diagnosis: Osteoarthritis of right glenohumeral joint  Surgical Diagnosis: Reverse TSA   Surgical Date: 3/19/2025  Days Since Last Surgery: 104    Visit # / Visits Authorized:  21 / 30  Insurance Authorization Period: 1/1/2025 to 12/31/2025  Date of Evaluation: 3/18/2025  Plan of Care Certification: 6/17/2025 to 9/17/2025      PT/PTA:     Number of PTA visits since last PT visit:   Time In: 0759   Time Out: 0858  Total Time (in minutes): 59   Total Billable Time (in minutes): 25 (some time not billed d/t 1:1 guidelines)    FOTO:  Intake Score:  %  Survey Score 2:  %  Survey Score 3:  %    Precautions:       Subjective   She had inc'd pain after thursday last week. She took a day or two off of exercises and found that helped wiht the pain..  Pain reported as 2/10.      Objective          From Visit on 6/17/2025  Shoulder Right Right Right Left Left Pain/Dysfunction with Movement     AROM PROM MMT AROM MMT     Flexion 133! 146 3+/5! WNL 5/5     Extension 30 NT 3+/5 WNL 5/5     Abduction 105! 75! 3/5! WNL 5/5 Most painful MMT and ROM   Adduction NT NT 4-/5 WNL 5/5     Internal rotation sacrum 53 3+/5! Inf bord scap 5/5     External Rotation 55 62 3+/5! WNL 5/5        Elbow Right Right Left Left Pain/Dysfunction with Movement     AROM MMT AROM MMT     Flexion WNL 4/5 WNL 5/5     Extension WNL 4/5! WNL 5/5         Strength: R=60#;  L=61#     Treatment:   THERAPEUTIC EXERCISES to develop strength, endurance, ROM, and flexibility, including:     Pulleys x3' flex/abd/flex ecc  Table  slides fwd 2x10 5s with lift  No money w/ RTB 2x10   Seated serratus punch 2x10  ER 2x10 red theraband seated  IR 3x10 green theraband seated  Upper trap stretch 5x15s  Levator scapulae stretch 5x15s  SL abd 2x10 2# (x7 were unweighted)   Supine abd x10      MANUAL THERAPY TECHNIQUES were applied to Right shoulder :   (R) shld PROM per protocol - abd only - NT  Static cupping to levator scapulae and upper trap 5 min (not included in billed time)  Gentle sup GHJ glides  - NT        NEUROMUSCULAR RE-EDUCATION ACTIVITIES to improve Coordination, Proprioception, Posture, and Motor Control.  The following were included:   SAPD 2x10 green theraband Seated  Flex (up) ->abd (down) AROM 1x10 (only 1 set today d/t pain)  Supine PNF D2/D1 2x10 each un-weighted today d/t pain     therapeutic activities to improve functional performance, including:  Cabinet lifts x10 flex with 1#, x10 flex without weight, x10 scap un-weighted  Rows blue theraband 3x10 seated  Education on return to sport in the future    Time Entry(in minutes):  Neuromuscular Re-Education Time Entry: 10  Therapeutic Activity Time Entry: 8  Therapeutic Exercise Time Entry: 30    Assessment & Plan   Assessment: Withheld progressions today d/t inc'd pain after last session. She conitnues to show good tolerance to abd AROM in gravity eliminated positions but inc'd pain with standing AROM abd.   Evaluation/Treatment Tolerance: Patient tolerated treatment well    The patient will continue to benefit from skilled outpatient physical therapy in order to address the deficits listed in the problem list on the initial evaluation, provide patient and family education, and maximize the patients level of independence in the home and community environments.     The patient's spiritual, cultural, and educational needs were considered, and the patient is agreeable to the plan of care and goals.           Plan: PT/PTA met face to face to discuss pt's treatment plan and progress  towards established goals. Pt will be seen by a physical therapist minimally every 6th visit or every 30 days. Continue POC.    Goals:   Active       LTG       Pt will improve FOTO score to </=62% limited to decrease perceived limitation with carrying, moving, and handling objects. (Progressing)       Start:  04/02/25    Expected End:  07/02/25            Pt will increase R shoulder AROM to WFL with in protocol in all planes to improve functional use of R RUE. (Progressing)       Start:  04/02/25    Expected End:  07/02/25            Pt will improve R shoulder MMTs by 1 grade to promote equal use of B UEs in performing functional tasks. (Progressing)       Start:  04/02/25    Expected End:  07/02/25            Pt will lift 20 lb objects without pain to promote functional QOL.  (Progressing)       Start:  04/02/25    Expected End:  07/02/25            Pt to report pain </= 2/10 with ADLs and IADLs using R UE to improve functional QOL.  (Progressing)       Start:  04/02/25    Expected End:  07/02/25              Resolved       STG       Pt will be independent with HEP to supplement PT in improving functional use of R UE.  (Met)       Start:  04/02/25    Expected End:  05/02/25    Resolved:  06/17/25         Pt will increase pain free R shoulder elevation AROM to >/= 90 deg to improve  functional mobility of UE (Met)       Start:  04/02/25    Expected End:  05/02/25    Resolved:  06/17/25         Pt will increase shoulder ER PROM in 30 deg abduction to >/=45 deg to improve functional mobility of UE (Met)       Start:  04/02/25    Expected End:  05/02/25    Resolved:  06/17/25         Pt will improve R shoulder MMTs to 3+/5 to promote equal use of B UEs in performing functional tasks. (Met)       Start:  04/02/25    Expected End:  05/02/25    Resolved:  06/24/25             Guru Milian, PT, DPT

## 2025-07-01 NOTE — H&P (VIEW-ONLY)
Chronic Patient Established Note   Chronic Pain Established Note (Follow up visit)         SUBJECTIVE:    Interval History 7/1/2025:  Patient returns to clinic for follow up for bilateral thoracic and left lumbar pain. Lumbar pain radiates to left hip. Hip pain started a few weeks ago when she felt a pop and the pain started in her hip/back. Patient requesting epidural injection for lumbar spine since it worked so well last time. Right shoulder replacement went well. Patient is currently undergoing PT for shoulder surgery. Denies weakness, numbness bowel and bladder incontinence.     Interval History 1/30/2025:  The patient returns to clinic today for follow up of neck and back pain via virtual visit. She reports increased left sided mid back pain that radiates into the left ribs. This is similar to the right sided pain she had in October. This is worse with taking deep breaths. She is scheduled for upcoming shoulder replacement. She would like to try an injection prior to aid with recovery. She continues to perform a home exercise routine. She denies any other health changes.     Interval History 11/19/2024:  The patient returns to clinic today for follow up of neck and back pain. She is s/p right T7/8 TF SANJUANA on 11/4/2024. She reports 100% relief of her pain. Her rib pain has resolved. She reports intermittent mid back pain lower than injection site. This is tolerable at this time. She is performing a home exercise routine. She denies any other health changes. Her pain today is 2/10.    Interval History 10/24/2024:  The patient returns to clinic today for follow up of neck and back pain. She is s/p C7/T1 IL SANJUANA on 10/10/2024. She reports 95% relief of her neck pain. She reports intermittent neck pain on the left. This is tolerable at this time. She is scheduled for thoracic injection next week. She continues to report mid back pain that is in between the shoulders blades that radiates into the right. She is  performing a home exercise routine. She denies any other health changes. Her pain today is 0/10.    Interval History 10/2/2024:  The patient returns to clinic today for follow up of neck and mid back pain. She is here today for imaging review. She continues to report neck pain that radiates into the shoulder. She also notes headaches. She denies any radicular arm pain. Her pain is worse with turning her head and activity. She is performing a home exercise routine. She denies any other health changes. Her pain today is 2/10.     Interval History 9/13/2024:  The patient returns to clinic today for follow up of pain. She is s/p right T12/L1 and L1/2 TF SANJUANA on 8/29/2024. She reports 100% relief of her back pain. She reports increased neck pain. She describes this pain as pressure. She does endorse head pain at the base that radiates forward. She also reports pain that radiates into the shoulder blade and around the rib. Her pain is worse with activity. She denies any radicular arm pain. She continues to perform a home exercise routine. She denies any other health changes. Her pain today is 8/10.    Interval History 7/23/2024:  The patient returns to clinic today for follow up of pain via virtual visit. She reports increased right shoulder pain over the few days. She was reaching down to get something off the floor. She felt something give in her shoulder. This pain felt similar to her pain before rotator cuff surgery. She did take Flexeril and 1/2 a Norco last night with benefit. The pain is much improved today. She also reports increased mid back pain over the last 2 months. She reports mid back pain that radiates into her ribs, right greater than left. She is performing home exercise. She denies any other health changes.     Interval History 4/3/2024:  Patient returns to clinic for follow up of neck and back pain. She is s/p TPI's on 2/8/24, which she reports has given her 100% percent relief for 2 months. Patient  "expresses that bilateral neck pain has returned + mid back pain. Patient expresses that with increased acitivity thorugh the day the muscles of the neck feel tighter. Patient expresses she continues to do home exercises + weights, and pickleball, feels like the exercises have not improved her pain.  Patient is not taking robaxin 500mg TID, she expresses taking flexeril at night as needed, and utilizes a heating pad. Patient states her current pain level is 4/10. Patient describes back pain has returned x 1 month, now is localized to the center of the back without radiation.     Interval History 3/5/2024:  The patient has a virtual follow up for neck and back pain. She is s/p TPIs for neck pain which she says helped 100% for that pain. Her primary complaint today is middle back pain. She had thoracic SANJUANA in the past but feels like this is slightly higher. However, she says that it is tolerable at this time. She completed PT today and says that she plans to keep up with her home exercises. Her overall pain today is 5/10.    Interval History 1/10/2024:  Rebecca Johnston Zollinger returns to clinic today s/p Right T12/L1 and L1/L2 TA SANJUANA on 12/18/2023.  She reports 85% relief that is ongoing.  She is back to walking 2 miles again.  She does find that when she exerts herself to her full extent that the pain returns slightly.  Today she continues to complain of neck pain from her previous visit and of new mid back pain.  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  She states that the pain is located bilaterally at the base of her skull and into her neck, left greater than right.  She describes the pain as pressure and it feels like "a clamp". Sometimes she has a headache by the end of the day.  The mid back pain is occasionally present in the AM and is relieve by stretching.  It is exacerbated by aerobic activity and pickleball.  " The pain will wrap around right side of ribs and under her breast. .  She has not started  PT yet-she was supposed to start yesterday, but the appointment was cancelled due to weather. Denies bowel and bladder dysfunction, fever, chills, nightsweats or weight changes. She continues Robaxin Pain today is 5/10.    She denies any new weakness, denies fevers, numbness, saddle anesthesia or incontinence.     Interval History 12/15/2023:  The patient returns to clinic today for follow up of back pain. She reports increased neck pain over the last month. She reports neck pain and pain at the base of her head, left side greater than right. This pain is worse with bending forward. She has tried Excedrin and Flexeril with limited relief. She denies any radicular arm pain. She continues to report right sided mid and low back pain. She does have radiating pain into the right groin and anterior thigh. She is scheduled for SANJUANA next week. She continues to perform a home exercise routine. She denies any other health changes. Her pain today is 8/10.     Interval History 11/21/2023:  The patient returns to clinic today for follow up of back pain via virtual visit. She is here today for imaging review. She reports worsened right sided mid and low back pain. This radiates into the right groin and anterior thigh. She denies any left leg pain. Her pain is worse with prolonged walking. She is performing a home exercise routine. She denies any other health changes.     Interval History 8/28/2023:  Rebecca Johnston Zollinger presents to the clinic for a follow-up appointment for back pain via virtual visit. She is s/p right T12/L1 TF SANJUANA on 8/14/2023. She reports 100% relief of her mid back pain. She continues to report low back pain that radiates into her groin bilaterally, right greater than left. This is worse with prolonged walking. She is no longer able to walk 4 miles, which is her typical exercise routine. She has completed multiple  rounds of PT. She denies any other health changes.       Original HPI:  67 year old female who presents with Right sided lower back pain. 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 The pain is located in her R back and goes to the R hip and groin when she walks. At worst it is a 10/10, baseline is a 3/10. She describes it as aching. She used to be able to walk 4 miles, now she states she can only walk 1 mile without pain. Last saw pain management at  2 years ago, no interventions performed. She has tried multiple rounds of PT, most recently in 2022 with some improvement, completed over 6 weeks of PT. She takes occasional ibuprofen and tylenol. Used to take tramadol and opioids but is no longer taking. She has had an MRI back in 2021 showing protruding disc at T12-L1. She denies any new weakness, denies fevers, numbness, saddle anesthesia or incontinence.     Pain Disability Index Review:      7/1/2025    11:20 AM 11/19/2024     2:21 PM 9/13/2024    10:08 AM   Last 3 PDI Scores   Pain Disability Index (PDI) 37 [phone] 56       Pain Medications:  None    Opioid Contract: not applicable     report:  Not applicable    Pain Procedures:   8/14/2023- Right T12/L1 TF SANJUANA  12/18/2023- Right T12/L1 and L1/2 TF SANJUANA- 85% relief  8/29/2024- Right T12/L1 and L1/2 TF SANJUANA- 100% relief  10/10/2024- C7/T1 IL SANJUANA- 95% relief   11/4/2024- Right T7/8 TF SANJUANA- 100% relief    Physical Therapy/Home Exercise: yes    Imaging:  MRI Cervical Spine 9/26/2024:  COMPARISON:  12/15/2023     FINDINGS:  Alignment: Normal.     Vertebrae: Normal marrow signal. No fracture.     Discs: Disc space narrowing present C5-C6-C7 with marginal osteophytosis     Cord: Normal.     Skull base and craniocervical junction: Normal.     Degenerative findings:     C2-C3: There is no focal disc herniation.No significant central canal narrowing.  No significant neural foraminal  narrowing.     C3-C4: There is no focal disc herniation.No significant central canal narrowing.  Mild LEFT neural foraminal narrowing.     C4-C5: There is no focal disc herniation.No significant central canal narrowing.  Moderate LEFT neural foraminal narrowing.     C5-C6: Posterior marginal osteophytic disc spur complex.There is no focal disc herniation.No significant central canal narrowing.  Moderate-severe RIGHT mild LEFT neural foraminal narrowing.     C6-C7: Posterior marginal osteophytic disc spur complex.There is no focal disc herniation.No significant central canal narrowing.  Moderate-severe LEFT mild RIGHT neural foraminal narrowing.     C7-T1: There is no focal disc herniation.No significant central canal narrowing.  No significant neural foraminal narrowing.     Paraspinal muscles & soft tissues: Multinodular goiter with dominant nodule RIGHT thyroid lobe, hyperintense measuring 2.1 cm.  Thyroid ultrasound recommended..     Impression:     Multilevel lumbar spondylosis most evident C5-C7 disc space levels with marginal osteophytosis and uncovertebral hypertrophy resulting in associated neural foraminal encroachment as detailed above.     Multiple thyroid nodules largest of which of RIGHT thyroid lobe 2.1 cm.  Routine thyroid ultrasound recommended for further evaluation.    MRI Thoracic Spine 9/26/2024:  COMPARISON:  None.     FINDINGS:  Alignment: The thoracic spine demonstrates proper alignment.     Vertebra: The vertebral bodies show normal signal intensity and height with no indication of acute fracture or pathologic marrow replacement process.  Suspect incidental hemangioma T10 with cortical endplate degenerative changes most evident T7-T8 through T9-T10     Disc: Multilevel disc space narrowing and desiccation with posterior marginal osteophytic spurring.     Cord: The demonstrated portion of the spinal cord is normal in signal intensity at all levels with no indication of myelomalacia or cord  edema.     Evaluation of the surrounding soft tissue structures demonstrates no acute abnormality.  As noted in the cervical portion of the report, multiple nodules within the thyroid for which routine thyroid ultrasound recommended.     Degenerative findings: There is a extradural soft tissue density at the T12 level lateralizing RIGHT with mass effect upon the thecal sac.  This is associated with a RIGHT foraminal disc from the T12-L1 level.  These findings are identified on series 7 image 55 and series 7, image 53, confirmed on sagittal series 4, image 8. there is associated mass effect upon the thecal sac yet no evidence for cord deviation.     Impression:     RIGHT foraminal disc protrusion T12-L1 with extruded disc material RIGHT extradural T12 level      XR CERVICAL SPINE 5 VIEW WITH FLEX AND EXT-12/15/2023  COMPARISON:  None.     FINDINGS:  Vertebral body heights are maintained.     Disc space narrowing and endplate changes C5-6 and C6-7.  Small posterior osteophytes at these levels.     Oblique views show bony narrowing of the neural foramina C5-6 on the right and C4-5 and C5-6 on the left.     Reversal of the normal cervical lordosis in the lower cervical spine.  Otherwise, AP alignment appears anatomic.     No significant prevertebral soft tissue edema.     Impression:     Degenerative change as above.       MRI Lumbar Spine 9/22/2023:  COMPARISON:  None.     FINDINGS:  Alignment: Grade 1 retrolisthesis of L5-S1.  Straightening of lumbar lordosis.     Vertebrae: Multilevel degenerative endplate changes no fracture or marrow infiltrative process.     Discs: Moderate to severe disc height loss noted throughout the lumbar spine.  No evidence for discitis.     Cord: Conus terminates at L1-L2 and appears unremarkable.  Cauda equina appears unremarkable.     Degenerative findings:     T11-T12: Right paracentral disc extrusion results in moderate effacement of the right lateral recess and mass effect upon the  right T12 nerve root.     T12-L1: Circumferential disc bulge with right paracentral disc extrusion result in mild effacement of the right lateral recess.     L1-L2: Circumferential disc bulge and mild facet arthropathy result in mild bilateral neural foraminal narrowing.     L2-L3: Circumferential disc bulge and mild facet arthropathy result in mild left neural foraminal narrowing.     L3-L4: Circumferential disc bulge and moderate facet arthropathy result in mild spinal canal stenosis and mild bilateral neural foraminal narrowing.     L4-L5: Circumferential disc bulge and mild facet arthropathy result in mild effacement of the lateral recesses and mild bilateral neural foraminal narrowing peer     L5-S1: Circumferential disc bulge and mild facet arthropathy result in moderate right, mild left neural foraminal narrowing.     Paraspinal muscles & soft tissues: Moderate paraspinal muscle atrophy.  4.0 cm left renal cyst.     Impression:     1. Multilevel degenerative changes of the lumbar and lower thoracic spine as detailed above.     Xray Hip 7/5/2023:  FINDINGS:  No acute fractures.  Some degenerative changes visualized lower lumbar spine to include endplate osteophytes and lumbosacral disc narrowing and possible vacuum phenomenon.  Intact right and left SI joints.  No definite narrowing of right or left hip joint spaces or acetabular spurring.  Preserved right and left femoral head contours.     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 Hives    Protease-amylase equip  Hives     Tide detergent       Current Medications:   Current Outpatient Medications   Medication Sig Dispense Refill    alendronate (FOSAMAX) 70 MG tablet Take 70 mg by mouth every 7  days.      aspirin (ECOTRIN) 81 MG EC tablet Take 81 mg by mouth once daily.      buPROPion (WELLBUTRIN XL) 150 MG TB24 tablet Take 1 tablet by mouth every morning.      celecoxib (CELEBREX) 200 MG capsule TAKE 1 CAPSULE BY MOUTH EVERY DAY 30 capsule 1    cycloSPORINE (RESTASIS) 0.05 % ophthalmic emulsion Place 1 drop into both eyes 3 (three) times daily.      estradioL (ESTRACE) 0.01 % (0.1 mg/gram) vaginal cream Place vaginally once daily.      FLUoxetine 20 MG capsule Take 1 capsule by mouth once daily.      hydrocodone-acetaminophen (HYCET) solution 7.5-325 mg/15mL Take by mouth 4 (four) times daily as needed for Pain.      methocarbamoL (ROBAXIN) 500 MG Tab TAKE 1 TABLET BY MOUTH THREE TIMES A DAY AS NEEDED MUSCLE PAIN 30 tablet 0    nitrofurantoin, macrocrystal-monohydrate, (MACROBID) 100 MG capsule Take 100 mg by mouth 2 (two) times daily.      pantoprazole (PROTONIX) 40 MG tablet Take 1 tablet by mouth once daily.      phenazopyridine (PYRIDIUM) 200 MG tablet Take 200 mg by mouth as needed for Pain.      prazosin (MINIPRESS) 1 MG Cap Take 2 capsules by mouth every evening.      rosuvastatin (CRESTOR) 5 MG tablet Take 1 tablet by mouth every evening.      tiZANidine (ZANAFLEX) 2 MG tablet 1 at bedtime for 3 nights then 2 every night for 3 nights then 3 every night to follow. Stay at the most comfortable dose. 90 tablet 2    traMADoL (ULTRAM) 50 mg tablet Take 1 tablet (50 mg total) by mouth every 6 (six) hours as needed for Pain. 20 tablet 0    ipratropium (ATROVENT) 42 mcg (0.06 %) nasal spray 2 sprays by Each Nostril route 4 (four) times daily. (Patient not taking: Reported on 7/1/2025)      oxyCODONE-acetaminophen (PERCOCET)  mg per tablet Take 1 tablet by mouth every 6 (six) hours as needed for Pain. (Patient not taking: Reported on 7/1/2025) 28 tablet 0    promethazine (PHENERGAN) 25 MG tablet Take 1 tablet (25 mg total) by mouth every 6 (six) hours as needed for Nausea. (Patient not taking:  Reported on 7/1/2025) 20 tablet 0     No current facility-administered medications for this visit.       REVIEW OF SYSTEMS:    GENERAL:  No weight loss, malaise or fevers.  HEENT:  Negative for frequent or significant headaches.  NECK:  Negative for lumps, goiter, pain and significant neck swelling.  RESPIRATORY:  Negative for cough, wheezing or shortness of breath.  CARDIOVASCULAR:  Negative for chest pain, leg swelling or palpitations. HTN  GI:  Negative for abdominal discomfort, blood in stools or black stools or change in bowel habits.  MUSCULOSKELETAL:  See HPI.  SKIN:  Negative for lesions, rash, and itching.  PSYCH:  Negative for sleep disturbance, mood disorder and recent psychosocial stressors.  HEMATOLOGY/LYMPHOLOGY:  Negative for prolonged bleeding, bruising easily or swollen nodes.  NEURO:   No history of headaches, syncope, paralysis, seizures or tremors.  All other reviewed and negative other than HPI.    Past Medical History:  Past Medical History:   Diagnosis Date    Anticoagulant long-term use     Hypertension        Past Surgical History:  Past Surgical History:   Procedure Laterality Date    EPIDURAL STEROID INJECTION N/A 10/10/2024    Procedure: CERVICAL C7/T1 IL SANJUANA *ASPIRIN OTC* HOLD FOR 5 DAYS  **EISSA PT**;  Surgeon: Devon Gamble MD;  Location: [facility], [place] PAIN T;  Service: Pain Management;  Laterality: N/A;  [phone]  2 WK F/U DEEPALI    REVERSE TOTAL SHOULDER ARTHROPLASTY Right 3/19/2025    Procedure: ARTHROPLASTY, SHOULDER, TOTAL,COMPLEX REVERSE, WITH WEDGE AUGENTATION;  Surgeon: Emanuel Aguilar MD;  Location: [facility] OR;  Service: Orthopedics;  Laterality: Right;  regional with catheter (interscalene)    TENODESIS, BICEPS, OPEN Right 3/19/2025    Procedure: TENODESIS, BICEPS, OPEN;  Surgeon: Emanuel Aguilar MD;  Location: [facility] OR;  Service: Orthopedics;  Laterality: Right;    TRANSFORAMINAL EPIDURAL INJECTION OF STEROID Right 8/14/2023    Procedure: INJECTION, STEROID, EPIDURAL,  TRANSFORAMINAL APPROACH, RIGHT T12/L1 SOONER DATE;  Surgeon: Isi Womack MD;  Location: [facility] PAIN MGT;  Service: Pain Management;  Laterality: Right;    TRANSFORAMINAL EPIDURAL INJECTION OF STEROID Right 12/18/2023    Procedure: LUMBAR TRANSFORAMINAL RIGHT T12/L1 AND L1/2;  Surgeon: Isi Womack MD;  Location: [facility] PAIN MGT;  Service: Pain Management;  Laterality: Right;  [phone]  2 WK F/U DEEPALI    TRANSFORAMINAL EPIDURAL INJECTION OF STEROID Right 8/29/2024    Procedure: THORACIC TRANSFORAMINAL RIGHT T12/L1 AND L1/2 *ASPIRIN OTC* HOLD FOR 5 DAYS;  Surgeon: Isi Womack MD;  Location: [facility] PAIN MGT;  Service: Pain Management;  Laterality: Right;  [phone]  2 WK F/U DEEPALI    TRANSFORAMINAL EPIDURAL INJECTION OF STEROID Right 11/4/2024    Procedure: THORACIC TRANSFORAMINAL RIGHT T7/8 *ASPIRIN OTC* HOLD FOR 5 DAYS;  Surgeon: Isi Womack MD;  Location: [facility] PAIN MGT;  Service: Pain Management;  Laterality: Right;  [phone]  2 WK F/U DEEPALI    TRANSFORAMINAL EPIDURAL INJECTION OF STEROID Left 2/17/2025    Procedure: THORACIC TRANSFORAMINAL LEFT T7/8 *ASPIRIN OTC*;  Surgeon: Isi Womack MD;  Location: [facility] PAIN MGT;  Service: Pain Management;  Laterality: Left;  2 WK F/U AMANDA       Family History:  No family history on file.    Social History:  Social History     Socioeconomic History    Marital status:    Tobacco Use    Smoking status: Former     Types: Cigarettes     Passive exposure: Past    Smokeless tobacco: Never    Tobacco comments:     Quit 20 years ago   Substance and Sexual Activity    Alcohol use: Not Currently    Drug use: Never    Sexual activity: Not Currently     Social Drivers of Health     Financial Resource Strain: Low Risk  (3/17/2025)    Overall Financial Resource Strain (CARDIA)     Difficulty of Paying Living Expenses: Not hard at all   Food Insecurity: No Food Insecurity (3/17/2025)    Hunger Vital Sign     Worried About Running Out of Food in the Last Year: Never true     Ran  "Out of Food in the Last Year: Never true   Transportation Needs: No Transportation Needs (3/17/2025)    PRAPARE - Transportation     Lack of Transportation (Medical): No     Lack of Transportation (Non-Medical): No   Physical Activity: Sufficiently Active (3/17/2025)    Exercise Vital Sign     Days of Exercise per Week: 5 days     Minutes of Exercise per Session: 50 min   Stress: Stress Concern Present (3/17/2025)    [employer] [place] [employer] - Occupational Stress Questionnaire     Feeling of Stress : Rather much   Housing Stability: Low Risk  (3/17/2025)    Housing Stability Vital Sign     Unable to Pay for Housing in the Last Year: No     Number of Times Moved in the Last Year: 1     Homeless in the Last Year: No       OBJECTIVE:    Vitals:    25 1119   BP: 126/84   Pulse: 65   Temp: 97.1 °F (36.2 °C)   TempSrc: Oral   SpO2: 99%   Weight: 74.1 kg (163 lb 5.8 oz)   Height: 5' 4" (1.626 m)   PainSc:   5   PainLoc: Back         PHYSICAL EXAMINATION:    General appearance: Well appearing, in no acute distress, alert and oriented x3.  Psych:  Mood and affect appropriate.  Skin: Skin color, texture, turgor normal, no rashes or lesions, in both upper and lower body.  Head/face:  Atraumatic, normocephalic.    Cor: Capillary refill <3 seconds.  Pulm: Symmetric chest rise, no respiratory distress noted.   Back:  Normal ROM   Extremities: No deformities, edema, or skin discoloration.   Musculoskeletal:  left upper extremity strength is normal. Right shoulder full ROM but strength limited due to recent surgery.  No atrophy or tone abnormalities are noted. +CECY, -SI compression  Neuro:  No loss of sensation is noted.    Gait: Normal.    ASSESSMENT: 69 y.o. year old female with back and neck pain, consistent with the followin. Thoracic radiculopathy  Procedure Order to Pain Management      2. Trochanteric bursitis of left hip        3. Chronic right shoulder pain                  PLAN:   We " discussed with the patient the assessment and recommendations. The following is the plan we agreed on:   - Previous imaging reviewed.     - Schedule for bilateral T7/8 TF SANJUANA.      - Schedule for left GTB injection in clinic    - Continue current medications.     - Continue home exercise routine.    - RTC 2 weeks after above procedures.       The above plan and management options were discussed at length with patient. Patient is in agreement with the above and verbalized understanding.    Sami Castro MD  07/01/2025  I have personally taken the history and examined this patient and agree with the fellow's note as stated above.

## 2025-07-01 NOTE — PROGRESS NOTES
Chronic Patient Established Note   Chronic Pain Established Note (Follow up visit)         SUBJECTIVE:    Interval History 7/1/2025:  Patient returns to clinic for follow up for bilateral thoracic and left lumbar pain. Lumbar pain radiates to left hip. Hip pain started a few weeks ago when she felt a pop and the pain started in her hip/back. Patient requesting epidural injection for lumbar spine since it worked so well last time. Right shoulder replacement went well. Patient is currently undergoing PT for shoulder surgery. Denies weakness, numbness bowel and bladder incontinence.     Interval History 1/30/2025:  The patient returns to clinic today for follow up of neck and back pain via virtual visit. She reports increased left sided mid back pain that radiates into the left ribs. This is similar to the right sided pain she had in October. This is worse with taking deep breaths. She is scheduled for upcoming shoulder replacement. She would like to try an injection prior to aid with recovery. She continues to perform a home exercise routine. She denies any other health changes.     Interval History 11/19/2024:  The patient returns to clinic today for follow up of neck and back pain. She is s/p right T7/8 TF SANJUANA on 11/4/2024. She reports 100% relief of her pain. Her rib pain has resolved. She reports intermittent mid back pain lower than injection site. This is tolerable at this time. She is performing a home exercise routine. She denies any other health changes. Her pain today is 2/10.    Interval History 10/24/2024:  The patient returns to clinic today for follow up of neck and back pain. She is s/p C7/T1 IL SANJUANA on 10/10/2024. She reports 95% relief of her neck pain. She reports intermittent neck pain on the left. This is tolerable at this time. She is scheduled for thoracic injection next week. She continues to report mid back pain that is in between the shoulders blades that radiates into the right. She is  performing a home exercise routine. She denies any other health changes. Her pain today is 0/10.    Interval History 10/2/2024:  The patient returns to clinic today for follow up of neck and mid back pain. She is here today for imaging review. She continues to report neck pain that radiates into the shoulder. She also notes headaches. She denies any radicular arm pain. Her pain is worse with turning her head and activity. She is performing a home exercise routine. She denies any other health changes. Her pain today is 2/10.     Interval History 9/13/2024:  The patient returns to clinic today for follow up of pain. She is s/p right T12/L1 and L1/2 TF SANJUANA on 8/29/2024. She reports 100% relief of her back pain. She reports increased neck pain. She describes this pain as pressure. She does endorse head pain at the base that radiates forward. She also reports pain that radiates into the shoulder blade and around the rib. Her pain is worse with activity. She denies any radicular arm pain. She continues to perform a home exercise routine. She denies any other health changes. Her pain today is 8/10.    Interval History 7/23/2024:  The patient returns to clinic today for follow up of pain via virtual visit. She reports increased right shoulder pain over the few days. She was reaching down to get something off the floor. She felt something give in her shoulder. This pain felt similar to her pain before rotator cuff surgery. She did take Flexeril and 1/2 a Norco last night with benefit. The pain is much improved today. She also reports increased mid back pain over the last 2 months. She reports mid back pain that radiates into her ribs, right greater than left. She is performing home exercise. She denies any other health changes.     Interval History 4/3/2024:  Patient returns to clinic for follow up of neck and back pain. She is s/p TPI's on 2/8/24, which she reports has given her 100% percent relief for 2 months. Patient  "expresses that bilateral neck pain has returned + mid back pain. Patient expresses that with increased acitivity thorugh the day the muscles of the neck feel tighter. Patient expresses she continues to do home exercises + weights, and pickleball, feels like the exercises have not improved her pain.  Patient is not taking robaxin 500mg TID, she expresses taking flexeril at night as needed, and utilizes a heating pad. Patient states her current pain level is 4/10. Patient describes back pain has returned x 1 month, now is localized to the center of the back without radiation.     Interval History 3/5/2024:  The patient has a virtual follow up for neck and back pain. She is s/p TPIs for neck pain which she says helped 100% for that pain. Her primary complaint today is middle back pain. She had thoracic SANJUANA in the past but feels like this is slightly higher. However, she says that it is tolerable at this time. She completed PT today and says that she plans to keep up with her home exercises. Her overall pain today is 5/10.    Interval History 1/10/2024:  Rebecca Johnston Zollinger returns to clinic today s/p Right T12/L1 and L1/L2 TA SANJUANA on 12/18/2023.  She reports 85% relief that is ongoing.  She is back to walking 2 miles again.  She does find that when she exerts herself to her full extent that the pain returns slightly.  Today she continues to complain of neck pain from her previous visit and of new mid back pain.  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  She states that the pain is located bilaterally at the base of her skull and into her neck, left greater than right.  She describes the pain as pressure and it feels like "a clamp". Sometimes she has a headache by the end of the day.  The mid back pain is occasionally present in the AM and is relieve by stretching.  It is exacerbated by aerobic activity and pickleball.  " The pain will wrap around right side of ribs and under her breast. .  She has not started  PT yet-she was supposed to start yesterday, but the appointment was cancelled due to weather. Denies bowel and bladder dysfunction, fever, chills, nightsweats or weight changes. She continues Robaxin Pain today is 5/10.    She denies any new weakness, denies fevers, numbness, saddle anesthesia or incontinence.     Interval History 12/15/2023:  The patient returns to clinic today for follow up of back pain. She reports increased neck pain over the last month. She reports neck pain and pain at the base of her head, left side greater than right. This pain is worse with bending forward. She has tried Excedrin and Flexeril with limited relief. She denies any radicular arm pain. She continues to report right sided mid and low back pain. She does have radiating pain into the right groin and anterior thigh. She is scheduled for SANJUANA next week. She continues to perform a home exercise routine. She denies any other health changes. Her pain today is 8/10.     Interval History 11/21/2023:  The patient returns to clinic today for follow up of back pain via virtual visit. She is here today for imaging review. She reports worsened right sided mid and low back pain. This radiates into the right groin and anterior thigh. She denies any left leg pain. Her pain is worse with prolonged walking. She is performing a home exercise routine. She denies any other health changes.     Interval History 8/28/2023:  Rebecca Johnston Zollinger presents to the clinic for a follow-up appointment for back pain via virtual visit. She is s/p right T12/L1 TF SANJUANA on 8/14/2023. She reports 100% relief of her mid back pain. She continues to report low back pain that radiates into her groin bilaterally, right greater than left. This is worse with prolonged walking. She is no longer able to walk 4 miles, which is her typical exercise routine. She has completed multiple  rounds of PT. She denies any other health changes.       Original HPI:  67 year old female who presents with Right sided lower back pain. 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 The pain is located in her R back and goes to the R hip and groin when she walks. At worst it is a 10/10, baseline is a 3/10. She describes it as aching. She used to be able to walk 4 miles, now she states she can only walk 1 mile without pain. Last saw pain management at  2 years ago, no interventions performed. She has tried multiple rounds of PT, most recently in 2022 with some improvement, completed over 6 weeks of PT. She takes occasional ibuprofen and tylenol. Used to take tramadol and opioids but is no longer taking. She has had an MRI back in 2021 showing protruding disc at T12-L1. She denies any new weakness, denies fevers, numbness, saddle anesthesia or incontinence.     Pain Disability Index Review:      7/1/2025    11:20 AM 11/19/2024     2:21 PM 9/13/2024    10:08 AM   Last 3 PDI Scores   Pain Disability Index (PDI) 37 [phone] [id number]       Pain Medications:  None    Opioid Contract: not applicable     report:  Not applicable    Pain Procedures:   8/14/2023- Right T12/L1 TF SANJUANA  12/18/2023- Right T12/L1 and L1/2 TF SANJUANA- 85% relief  8/29/2024- Right T12/L1 and L1/2 TF SANJUANA- 100% relief  10/10/2024- C7/T1 IL SANJUANA- 95% relief   11/4/2024- Right T7/8 TF SANJUANA- 100% relief    Physical Therapy/Home Exercise: yes    Imaging:  MRI Cervical Spine 9/26/2024:  COMPARISON:  12/15/2023     FINDINGS:  Alignment: Normal.     Vertebrae: Normal marrow signal. No fracture.     Discs: Disc space narrowing present C5-C6-C7 with marginal osteophytosis     Cord: Normal.     Skull base and craniocervical junction: Normal.     Degenerative findings:     C2-C3: There is no focal disc herniation.No significant central canal narrowing.  No significant neural foraminal  narrowing.     C3-C4: There is no focal disc herniation.No significant central canal narrowing.  Mild LEFT neural foraminal narrowing.     C4-C5: There is no focal disc herniation.No significant central canal narrowing.  Moderate LEFT neural foraminal narrowing.     C5-C6: Posterior marginal osteophytic disc spur complex.There is no focal disc herniation.No significant central canal narrowing.  Moderate-severe RIGHT mild LEFT neural foraminal narrowing.     C6-C7: Posterior marginal osteophytic disc spur complex.There is no focal disc herniation.No significant central canal narrowing.  Moderate-severe LEFT mild RIGHT neural foraminal narrowing.     C7-T1: There is no focal disc herniation.No significant central canal narrowing.  No significant neural foraminal narrowing.     Paraspinal muscles & soft tissues: Multinodular goiter with dominant nodule RIGHT thyroid lobe, hyperintense measuring 2.1 cm.  Thyroid ultrasound recommended..     Impression:     Multilevel lumbar spondylosis most evident C5-C7 disc space levels with marginal osteophytosis and uncovertebral hypertrophy resulting in associated neural foraminal encroachment as detailed above.     Multiple thyroid nodules largest of which of RIGHT thyroid lobe 2.1 cm.  Routine thyroid ultrasound recommended for further evaluation.    MRI Thoracic Spine 9/26/2024:  COMPARISON:  None.     FINDINGS:  Alignment: The thoracic spine demonstrates proper alignment.     Vertebra: The vertebral bodies show normal signal intensity and height with no indication of acute fracture or pathologic marrow replacement process.  Suspect incidental hemangioma T10 with cortical endplate degenerative changes most evident T7-T8 through T9-T10     Disc: Multilevel disc space narrowing and desiccation with posterior marginal osteophytic spurring.     Cord: The demonstrated portion of the spinal cord is normal in signal intensity at all levels with no indication of myelomalacia or cord  edema.     Evaluation of the surrounding soft tissue structures demonstrates no acute abnormality.  As noted in the cervical portion of the report, multiple nodules within the thyroid for which routine thyroid ultrasound recommended.     Degenerative findings: There is a extradural soft tissue density at the T12 level lateralizing RIGHT with mass effect upon the thecal sac.  This is associated with a RIGHT foraminal disc from the T12-L1 level.  These findings are identified on series 7 image 55 and series 7, image 53, confirmed on sagittal series 4, image 8. there is associated mass effect upon the thecal sac yet no evidence for cord deviation.     Impression:     RIGHT foraminal disc protrusion T12-L1 with extruded disc material RIGHT extradural T12 level      XR CERVICAL SPINE 5 VIEW WITH FLEX AND EXT-12/15/2023  COMPARISON:  None.     FINDINGS:  Vertebral body heights are maintained.     Disc space narrowing and endplate changes C5-6 and C6-7.  Small posterior osteophytes at these levels.     Oblique views show bony narrowing of the neural foramina C5-6 on the right and C4-5 and C5-6 on the left.     Reversal of the normal cervical lordosis in the lower cervical spine.  Otherwise, AP alignment appears anatomic.     No significant prevertebral soft tissue edema.     Impression:     Degenerative change as above.       MRI Lumbar Spine 9/22/2023:  COMPARISON:  None.     FINDINGS:  Alignment: Grade 1 retrolisthesis of L5-S1.  Straightening of lumbar lordosis.     Vertebrae: Multilevel degenerative endplate changes no fracture or marrow infiltrative process.     Discs: Moderate to severe disc height loss noted throughout the lumbar spine.  No evidence for discitis.     Cord: Conus terminates at L1-L2 and appears unremarkable.  Cauda equina appears unremarkable.     Degenerative findings:     T11-T12: Right paracentral disc extrusion results in moderate effacement of the right lateral recess and mass effect upon the  right T12 nerve root.     T12-L1: Circumferential disc bulge with right paracentral disc extrusion result in mild effacement of the right lateral recess.     L1-L2: Circumferential disc bulge and mild facet arthropathy result in mild bilateral neural foraminal narrowing.     L2-L3: Circumferential disc bulge and mild facet arthropathy result in mild left neural foraminal narrowing.     L3-L4: Circumferential disc bulge and moderate facet arthropathy result in mild spinal canal stenosis and mild bilateral neural foraminal narrowing.     L4-L5: Circumferential disc bulge and mild facet arthropathy result in mild effacement of the lateral recesses and mild bilateral neural foraminal narrowing peer     L5-S1: Circumferential disc bulge and mild facet arthropathy result in moderate right, mild left neural foraminal narrowing.     Paraspinal muscles & soft tissues: Moderate paraspinal muscle atrophy.  4.0 cm left renal cyst.     Impression:     1. Multilevel degenerative changes of the lumbar and lower thoracic spine as detailed above.     Xray Hip 7/5/2023:  FINDINGS:  No acute fractures.  Some degenerative changes visualized lower lumbar spine to include endplate osteophytes and lumbosacral disc narrowing and possible vacuum phenomenon.  Intact right and left SI joints.  No definite narrowing of right or left hip joint spaces or acetabular spurring.  Preserved right and left femoral head contours.     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 Hives    Protease-amylase equip  Hives     Tide detergent       Current Medications:   Current Outpatient Medications   Medication Sig Dispense Refill    alendronate (FOSAMAX) 70 MG tablet Take 70 mg by mouth every 7  days.      aspirin (ECOTRIN) 81 MG EC tablet Take 81 mg by mouth once daily.      buPROPion (WELLBUTRIN XL) 150 MG TB24 tablet Take 1 tablet by mouth every morning.      celecoxib (CELEBREX) 200 MG capsule TAKE 1 CAPSULE BY MOUTH EVERY DAY 30 capsule 1    cycloSPORINE (RESTASIS) 0.05 % ophthalmic emulsion Place 1 drop into both eyes 3 (three) times daily.      estradioL (ESTRACE) 0.01 % (0.1 mg/gram) vaginal cream Place vaginally once daily.      FLUoxetine 20 MG capsule Take 1 capsule by mouth once daily.      hydrocodone-acetaminophen (HYCET) solution 7.5-325 mg/15mL Take by mouth 4 (four) times daily as needed for Pain.      methocarbamoL (ROBAXIN) 500 MG Tab TAKE 1 TABLET BY MOUTH THREE TIMES A DAY AS NEEDED MUSCLE PAIN 30 tablet 0    nitrofurantoin, macrocrystal-monohydrate, (MACROBID) 100 MG capsule Take 100 mg by mouth 2 (two) times daily.      pantoprazole (PROTONIX) 40 MG tablet Take 1 tablet by mouth once daily.      phenazopyridine (PYRIDIUM) 200 MG tablet Take 200 mg by mouth as needed for Pain.      prazosin (MINIPRESS) 1 MG Cap Take 2 capsules by mouth every evening.      rosuvastatin (CRESTOR) 5 MG tablet Take 1 tablet by mouth every evening.      tiZANidine (ZANAFLEX) 2 MG tablet 1 at bedtime for 3 nights then 2 every night for 3 nights then 3 every night to follow. Stay at the most comfortable dose. 90 tablet 2    traMADoL (ULTRAM) 50 mg tablet Take 1 tablet (50 mg total) by mouth every 6 (six) hours as needed for Pain. 20 tablet 0    ipratropium (ATROVENT) 42 mcg (0.06 %) nasal spray 2 sprays by Each Nostril route 4 (four) times daily. (Patient not taking: Reported on 7/1/2025)      oxyCODONE-acetaminophen (PERCOCET)  mg per tablet Take 1 tablet by mouth every 6 (six) hours as needed for Pain. (Patient not taking: Reported on 7/1/2025) 28 tablet 0    promethazine (PHENERGAN) 25 MG tablet Take 1 tablet (25 mg total) by mouth every 6 (six) hours as needed for Nausea. (Patient not taking:  Reported on 7/1/2025) 20 tablet 0     No current facility-administered medications for this visit.       REVIEW OF SYSTEMS:    GENERAL:  No weight loss, malaise or fevers.  HEENT:  Negative for frequent or significant headaches.  NECK:  Negative for lumps, goiter, pain and significant neck swelling.  RESPIRATORY:  Negative for cough, wheezing or shortness of breath.  CARDIOVASCULAR:  Negative for chest pain, leg swelling or palpitations. HTN  GI:  Negative for abdominal discomfort, blood in stools or black stools or change in bowel habits.  MUSCULOSKELETAL:  See HPI.  SKIN:  Negative for lesions, rash, and itching.  PSYCH:  Negative for sleep disturbance, mood disorder and recent psychosocial stressors.  HEMATOLOGY/LYMPHOLOGY:  Negative for prolonged bleeding, bruising easily or swollen nodes.  NEURO:   No history of headaches, syncope, paralysis, seizures or tremors.  All other reviewed and negative other than HPI.    Past Medical History:  Past Medical History:   Diagnosis Date    Anticoagulant long-term use     Hypertension        Past Surgical History:  Past Surgical History:   Procedure Laterality Date    EPIDURAL STEROID INJECTION N/A 10/10/2024    Procedure: CERVICAL C7/T1 IL SANJUANA *ASPIRIN OTC* HOLD FOR 5 DAYS  **EISSA PT**;  Surgeon: Devon Gamble MD;  Location: [facility] PAIN T;  Service: Pain Management;  Laterality: N/A;  [phone]  2 WK F/U DEEPALI    REVERSE TOTAL SHOULDER ARTHROPLASTY Right 3/19/2025    Procedure: ARTHROPLASTY, SHOULDER, TOTAL,COMPLEX REVERSE, WITH WEDGE AUGENTATION;  Surgeon: Emanuel Aguilar MD;  Location: [facility] OR;  Service: Orthopedics;  Laterality: Right;  regional with catheter (interscalene)    TENODESIS, BICEPS, OPEN Right 3/19/2025    Procedure: TENODESIS, BICEPS, OPEN;  Surgeon: Emanuel Aguilar MD;  Location: [facility] OR;  Service: Orthopedics;  Laterality: Right;    TRANSFORAMINAL EPIDURAL INJECTION OF STEROID Right 8/14/2023    Procedure: INJECTION, STEROID, EPIDURAL,  TRANSFORAMINAL APPROACH, RIGHT T12/L1 SOONER DATE;  Surgeon: Isi Womack MD;  Location: [facility] PAIN MGT;  Service: Pain Management;  Laterality: Right;    TRANSFORAMINAL EPIDURAL INJECTION OF STEROID Right 12/18/2023    Procedure: LUMBAR TRANSFORAMINAL RIGHT T12/L1 AND L1/2;  Surgeon: Isi Womack MD;  Location: [facility] PAIN MGT;  Service: Pain Management;  Laterality: Right;  [phone]  2 WK F/U DEEPALI    TRANSFORAMINAL EPIDURAL INJECTION OF STEROID Right 8/29/2024    Procedure: THORACIC TRANSFORAMINAL RIGHT T12/L1 AND L1/2 *ASPIRIN OTC* HOLD FOR 5 DAYS;  Surgeon: Isi Womack MD;  Location: [facility] PAIN MGT;  Service: Pain Management;  Laterality: Right;  [phone]  2 WK F/U DEEPALI    TRANSFORAMINAL EPIDURAL INJECTION OF STEROID Right 11/4/2024    Procedure: THORACIC TRANSFORAMINAL RIGHT T7/8 *ASPIRIN OTC* HOLD FOR 5 DAYS;  Surgeon: Isi Womack MD;  Location: [facility] PAIN MGT;  Service: Pain Management;  Laterality: Right;  [phone]  2 WK F/U DEEPALI    TRANSFORAMINAL EPIDURAL INJECTION OF STEROID Left 2/17/2025    Procedure: THORACIC TRANSFORAMINAL LEFT T7/8 *ASPIRIN OTC*;  Surgeon: Isi Womack MD;  Location: [facility] PAIN MGT;  Service: Pain Management;  Laterality: Left;  2 WK F/U AMANDA       Family History:  No family history on file.    Social History:  Social History     Socioeconomic History    Marital status:    Tobacco Use    Smoking status: Former     Types: Cigarettes     Passive exposure: Past    Smokeless tobacco: Never    Tobacco comments:     Quit 20 years ago   Substance and Sexual Activity    Alcohol use: Not Currently    Drug use: Never    Sexual activity: Not Currently     Social Drivers of Health     Financial Resource Strain: Low Risk  (3/17/2025)    Overall Financial Resource Strain (CARDIA)     Difficulty of Paying Living Expenses: Not hard at all   Food Insecurity: No Food Insecurity (3/17/2025)    Hunger Vital Sign     Worried About Running Out of Food in the Last Year: Never true     Ran  "Out of Food in the Last Year: Never true   Transportation Needs: No Transportation Needs (3/17/2025)    PRAPARE - Transportation     Lack of Transportation (Medical): No     Lack of Transportation (Non-Medical): No   Physical Activity: Sufficiently Active (3/17/2025)    Exercise Vital Sign     Days of Exercise per Week: 5 days     Minutes of Exercise per Session: 50 min   Stress: Stress Concern Present (3/17/2025)    [employer] - Occupational Stress Questionnaire     Feeling of Stress : Rather much   Housing Stability: Low Risk  (3/17/2025)    Housing Stability Vital Sign     Unable to Pay for Housing in the Last Year: No     Number of Times Moved in the Last Year: 1     Homeless in the Last Year: No       OBJECTIVE:    Vitals:    25 1119   BP: 126/84   Pulse: 65   Temp: 97.1 °F (36.2 °C)   TempSrc: Oral   SpO2: 99%   Weight: 74.1 kg (163 lb 5.8 oz)   Height: 5' 4" (1.626 m)   PainSc:   5   PainLoc: Back         PHYSICAL EXAMINATION:    General appearance: Well appearing, in no acute distress, alert and oriented x3.  Psych:  Mood and affect appropriate.  Skin: Skin color, texture, turgor normal, no rashes or lesions, in both upper and lower body.  Head/face:  Atraumatic, normocephalic.    Cor: Capillary refill <3 seconds.  Pulm: Symmetric chest rise, no respiratory distress noted.   Back:  Normal ROM   Extremities: No deformities, edema, or skin discoloration.   Musculoskeletal:  left upper extremity strength is normal. Right shoulder full ROM but strength limited due to recent surgery.  No atrophy or tone abnormalities are noted. +CECY, -SI compression  Neuro:  No loss of sensation is noted.    Gait: Normal.    ASSESSMENT: 69 y.o. year old female with back and neck pain, consistent with the followin. Thoracic radiculopathy  Procedure Order to Pain Management      2. Trochanteric bursitis of left hip        3. Chronic right shoulder pain                  PLAN:   We " discussed with the patient the assessment and recommendations. The following is the plan we agreed on:   - Previous imaging reviewed.     - Schedule for bilateral T7/8 TF SANJUANA.      - Schedule for left GTB injection in clinic    - Continue current medications.     - Continue home exercise routine.    - RTC 2 weeks after above procedures.       The above plan and management options were discussed at length with patient. Patient is in agreement with the above and verbalized understanding.    Sami Castro MD  07/01/2025  I have personally taken the history and examined this patient and agree with the fellow's note as stated above.

## 2025-07-03 ENCOUNTER — CLINICAL SUPPORT (OUTPATIENT)
Dept: REHABILITATION | Facility: HOSPITAL | Age: 70
End: 2025-07-03
Payer: MEDICARE

## 2025-07-03 DIAGNOSIS — M25.511 CHRONIC RIGHT SHOULDER PAIN: ICD-10-CM

## 2025-07-03 DIAGNOSIS — R53.1 WEAKNESS: ICD-10-CM

## 2025-07-03 DIAGNOSIS — M25.611 DECREASED RANGE OF MOTION OF RIGHT SHOULDER: ICD-10-CM

## 2025-07-03 DIAGNOSIS — M19.011 OSTEOARTHRITIS OF RIGHT GLENOHUMERAL JOINT: Primary | ICD-10-CM

## 2025-07-03 DIAGNOSIS — G89.29 CHRONIC RIGHT SHOULDER PAIN: ICD-10-CM

## 2025-07-03 PROCEDURE — 97112 NEUROMUSCULAR REEDUCATION: CPT

## 2025-07-03 PROCEDURE — 97140 MANUAL THERAPY 1/> REGIONS: CPT

## 2025-07-03 PROCEDURE — 97110 THERAPEUTIC EXERCISES: CPT

## 2025-07-03 NOTE — PROGRESS NOTES
Outpatient Rehab    Physical Therapy Visit    Patient Name: Rebecca Johnston Zollinger  MRN: [id number]  YOB: 1955  Encounter Date: 7/3/2025    Therapy Diagnosis:   Encounter Diagnoses   Name Primary?    Osteoarthritis of right glenohumeral joint Yes    Chronic right shoulder pain     Weakness     Decreased range of motion of right shoulder        Physician: Emanuel Aguilar MD    Physician Orders: Eval and Treat  Medical Diagnosis: Osteoarthritis of right glenohumeral joint  Surgical Diagnosis: Reverse TSA   Surgical Date: 3/19/2025  Days Since Last Surgery: 106    Visit # / Visits Authorized:  22 / 30  Insurance Authorization Period: 1/1/2025 to 12/31/2025  Date of Evaluation: 3/18/2025  Plan of Care Certification: 6/17/2025 to 9/17/2025      PT/PTA:     Number of PTA visits since last PT visit:   Time In: 0759   Time Out: 0859  Total Time (in minutes): 60   Total Billable Time (in minutes): 55    FOTO:  Intake Score:  %  Survey Score 2:  %  Survey Score 3:  %    Precautions:       Subjective   Her back is still giving her a lot of trouble and is getting pain in the shoulder occasionally in the periscapular area and into anterior upper arm..  Pain reported as 2/10.      Objective          From Visit on 6/17/2025  Shoulder Right Right Right Left Left Pain/Dysfunction with Movement     AROM PROM MMT AROM MMT     Flexion 133! 146 3+/5! WNL 5/5     Extension 30 NT 3+/5 WNL 5/5     Abduction 105! 75! 3/5! WNL 5/5 Most painful MMT and ROM   Adduction NT NT 4-/5 WNL 5/5     Internal rotation sacrum 53 3+/5! Inf bord scap 5/5     External Rotation 55 62 3+/5! WNL 5/5        Elbow Right Right Left Left Pain/Dysfunction with Movement     AROM MMT AROM MMT     Flexion WNL 4/5 WNL 5/5     Extension WNL 4/5! WNL 5/5         Strength: R=60#;  L=61#     Treatment:   THERAPEUTIC EXERCISES to develop strength, endurance, ROM, and flexibility, including:     Pulleys x3' flex/abd/flex ecc  Table slides fwd  2x10 5s with lift  No money w/ RTB 2x10   Seated serratus punch 2x10  ER 2x10 red theraband seated  IR 3x10 green theraband seated  Upper trap stretch 5x15s  Levator scapulae stretch 5x15s  SL abd 2x10    Supine abd x10      MANUAL THERAPY TECHNIQUES were applied to Right shoulder :   (R) shld PROM per protocol - abd only  Static cupping to levator scapulae and upper trap 5 min (not included in billed time)  Gentle sup GHJ glides         NEUROMUSCULAR RE-EDUCATION ACTIVITIES to improve Coordination, Proprioception, Posture, and Motor Control.  The following were included:   SAPD 2x10 green theraband Seated  Flex (up) ->abd (down) AROM 1x10 (only 1 set today d/t pain)  Supine PNF D2/D1 2x10 each un-weighted today d/t pain     therapeutic activities to improve functional performance, including:  Cabinet lifts x10 flex with 1#, x10 flex without weight, x10 scap un-weighted  Rows blue theraband 3x10 seated  Education on return to sport in the future    Time Entry(in minutes):  Manual Therapy Time Entry: 8  Neuromuscular Re-Education Time Entry: 12  Therapeutic Activity Time Entry: 5  Therapeutic Exercise Time Entry: 30    Assessment & Plan   Assessment: Improved tolerance to activity today without adverse effects. She is progressing well and plan to progress further next session.        The patient will continue to benefit from skilled outpatient physical therapy in order to address the deficits listed in the problem list on the initial evaluation, provide patient and family education, and maximize the patients level of independence in the home and community environments.     The patient's spiritual, cultural, and educational needs were considered, and the patient is agreeable to the plan of care and goals.           Plan: PT/PTA met face to face to discuss pt's treatment plan and progress towards established goals. Pt will be seen by a physical therapist minimally every 6th visit or every 30 days. Continue  POC.    Goals:   Active       LTG       Pt will improve FOTO score to </=62% limited to decrease perceived limitation with carrying, moving, and handling objects. (Progressing)       Start:  04/02/25    Expected End:  07/02/25            Pt will increase R shoulder AROM to WFL with in protocol in all planes to improve functional use of R RUE. (Progressing)       Start:  04/02/25    Expected End:  07/02/25            Pt will improve R shoulder MMTs by 1 grade to promote equal use of B UEs in performing functional tasks. (Progressing)       Start:  04/02/25    Expected End:  07/02/25            Pt will lift 20 lb objects without pain to promote functional QOL.  (Progressing)       Start:  04/02/25    Expected End:  07/02/25            Pt to report pain </= 2/10 with ADLs and IADLs using R UE to improve functional QOL.  (Progressing)       Start:  04/02/25    Expected End:  07/02/25              Resolved       STG       Pt will be independent with HEP to supplement PT in improving functional use of R UE.  (Met)       Start:  04/02/25    Expected End:  05/02/25    Resolved:  06/17/25         Pt will increase pain free R shoulder elevation AROM to >/= 90 deg to improve  functional mobility of UE (Met)       Start:  04/02/25    Expected End:  05/02/25    Resolved:  06/17/25         Pt will increase shoulder ER PROM in 30 deg abduction to >/=45 deg to improve functional mobility of UE (Met)       Start:  04/02/25    Expected End:  05/02/25    Resolved:  06/17/25         Pt will improve R shoulder MMTs to 3+/5 to promote equal use of B UEs in performing functional tasks. (Met)       Start:  04/02/25    Expected End:  05/02/25    Resolved:  06/24/25             Guru Milian, PT, DPT

## 2025-07-08 ENCOUNTER — TELEPHONE (OUTPATIENT)
Dept: PAIN MEDICINE | Facility: CLINIC | Age: 70
End: 2025-07-08
Payer: MEDICARE

## 2025-07-08 ENCOUNTER — CLINICAL SUPPORT (OUTPATIENT)
Dept: REHABILITATION | Facility: HOSPITAL | Age: 70
End: 2025-07-08
Payer: MEDICARE

## 2025-07-08 DIAGNOSIS — M19.011 OSTEOARTHRITIS OF RIGHT GLENOHUMERAL JOINT: Primary | ICD-10-CM

## 2025-07-08 DIAGNOSIS — M54.14 THORACIC RADICULOPATHY: Primary | ICD-10-CM

## 2025-07-08 DIAGNOSIS — M25.511 CHRONIC RIGHT SHOULDER PAIN: ICD-10-CM

## 2025-07-08 DIAGNOSIS — R53.1 WEAKNESS: ICD-10-CM

## 2025-07-08 DIAGNOSIS — G89.29 CHRONIC RIGHT SHOULDER PAIN: ICD-10-CM

## 2025-07-08 DIAGNOSIS — M25.611 DECREASED RANGE OF MOTION OF RIGHT SHOULDER: ICD-10-CM

## 2025-07-08 PROCEDURE — 97110 THERAPEUTIC EXERCISES: CPT | Mod: CQ

## 2025-07-08 PROCEDURE — 97530 THERAPEUTIC ACTIVITIES: CPT | Mod: CQ

## 2025-07-08 PROCEDURE — 97112 NEUROMUSCULAR REEDUCATION: CPT | Mod: CQ

## 2025-07-08 NOTE — PROGRESS NOTES
Outpatient Rehab    Physical Therapy Visit    Patient Name: Rebecca Johnston Zollinger  MRN: [id number]  YOB: 1955  Encounter Date: 7/8/2025    Therapy Diagnosis:   Encounter Diagnoses   Name Primary?    Osteoarthritis of right glenohumeral joint Yes    Chronic right shoulder pain     Weakness     Decreased range of motion of right shoulder        Physician: Emanuel Aguilar MD    Physician Orders: Eval and Treat  Medical Diagnosis: Osteoarthritis of right glenohumeral joint  Surgical Diagnosis: Reverse TSA   Surgical Date: 3/19/2025  Days Since Last Surgery: 111    Visit # / Visits Authorized:  23 / 30  Insurance Authorization Period: 1/1/2025 to 12/31/2025  Date of Evaluation: 3/18/2025  Plan of Care Certification: 6/17/2025 to 9/17/2025      PT/PTA: PTA   Number of PTA visits since last PT visit:1  Time In: 1447   Time Out: 1554  Total Time (in minutes): 67   Total Billable Time (in minutes): 67    FOTO:  Intake Score:  %  Survey Score 2:  %  Survey Score 3:  %    Precautions:       Subjective   She continues to be active and sometimes pays the price for over doing things..  Pain reported as 0/10.      Objective          From Visit on 6/17/2025  Shoulder Right Right Right Left Left Pain/Dysfunction with Movement     AROM PROM MMT AROM MMT     Flexion 133! 146 3+/5! WNL 5/5     Extension 30 NT 3+/5 WNL 5/5     Abduction 105! 75! 3/5! WNL 5/5 Most painful MMT and ROM   Adduction NT NT 4-/5 WNL 5/5     Internal rotation sacrum 53 3+/5! Inf bord scap 5/5     External Rotation 55 62 3+/5! WNL 5/5        Elbow Right Right Left Left Pain/Dysfunction with Movement     AROM MMT AROM MMT     Flexion WNL 4/5 WNL 5/5     Extension WNL 4/5! WNL 5/5         Strength: R=60#;  L=61#     Treatment:   THERAPEUTIC EXERCISES to develop strength, endurance, ROM, and flexibility, including:     Pulleys x3' flex/abd/flex ecc  Table slides fwd 2x10 5s with lift  No money w/ RTB 2x10   Seated serratus punch  2x10  ER 2x10 red theraband seated  IR 3x10 green theraband seated  Upper trap stretch 5x15s  Levator scapulae stretch 5x15s  SL abd 2x10   Supine abd x10      MANUAL THERAPY TECHNIQUES were applied to Right shoulder :   (R) shld PROM per protocol - abd only  Static cupping to levator scapulae and upper trap 5 min (not included in billed time) - NT  Gentle sup GHJ glides         NEUROMUSCULAR RE-EDUCATION ACTIVITIES to improve Coordination, Proprioception, Posture, and Motor Control.  The following were included:   SAPD 2x10 green theraband Seated  Flex (up) ->abd (down) AROM 19x   Supine PNF D2/D1 2x10 each un-weighted today d/t pain     therapeutic activities to improve functional performance, including:  Cabinet lifts x10 flex with 1#, x10 flex without weight, x10 scap un-weighted  Rows blue theraband 3x10 seated  Education on time frame for return to normalcy    Time Entry(in minutes):  Manual Therapy Time Entry: 5  Neuromuscular Re-Education Time Entry: 15  Therapeutic Activity Time Entry: 15  Therapeutic Exercise Time Entry: 30    Assessment & Plan   Assessment: Improved tolerance to activity today without adverse effects. She is progressing well and plan to progress further next session.   Evaluation/Treatment Tolerance: Patient tolerated treatment well    The patient will continue to benefit from skilled outpatient physical therapy in order to address the deficits listed in the problem list on the initial evaluation, provide patient and family education, and maximize the patients level of independence in the home and community environments.     The patient's spiritual, cultural, and educational needs were considered, and the patient is agreeable to the plan of care and goals.           Plan: PT/PTA met face to face to discuss pt's treatment plan and progress towards established goals. Pt will be seen by a physical therapist minimally every 6th visit or every 30 days. Continue POC.    Goals:   Active        LTG       Pt will improve FOTO score to </=62% limited to decrease perceived limitation with carrying, moving, and handling objects. (Progressing)       Start:  04/02/25    Expected End:  07/02/25            Pt will increase R shoulder AROM to WFL with in protocol in all planes to improve functional use of R RUE. (Progressing)       Start:  04/02/25    Expected End:  07/02/25            Pt will improve R shoulder MMTs by 1 grade to promote equal use of B UEs in performing functional tasks. (Progressing)       Start:  04/02/25    Expected End:  07/02/25            Pt will lift 20 lb objects without pain to promote functional QOL.  (Progressing)       Start:  04/02/25    Expected End:  07/02/25            Pt to report pain </= 2/10 with ADLs and IADLs using R UE to improve functional QOL.  (Progressing)       Start:  04/02/25    Expected End:  07/02/25              Resolved       STG       Pt will be independent with HEP to supplement PT in improving functional use of R UE.  (Met)       Start:  04/02/25    Expected End:  05/02/25    Resolved:  06/17/25         Pt will increase pain free R shoulder elevation AROM to >/= 90 deg to improve  functional mobility of UE (Met)       Start:  04/02/25    Expected End:  05/02/25    Resolved:  06/17/25         Pt will increase shoulder ER PROM in 30 deg abduction to >/=45 deg to improve functional mobility of UE (Met)       Start:  04/02/25    Expected End:  05/02/25    Resolved:  06/17/25         Pt will improve R shoulder MMTs to 3+/5 to promote equal use of B UEs in performing functional tasks. (Met)       Start:  04/02/25    Expected End:  05/02/25    Resolved:  06/24/25             Jose G Blake, EASTON

## 2025-07-10 ENCOUNTER — CLINICAL SUPPORT (OUTPATIENT)
Dept: REHABILITATION | Facility: HOSPITAL | Age: 70
End: 2025-07-10
Payer: MEDICARE

## 2025-07-10 DIAGNOSIS — G89.29 CHRONIC RIGHT SHOULDER PAIN: ICD-10-CM

## 2025-07-10 DIAGNOSIS — M25.511 CHRONIC RIGHT SHOULDER PAIN: ICD-10-CM

## 2025-07-10 DIAGNOSIS — R53.1 WEAKNESS: ICD-10-CM

## 2025-07-10 DIAGNOSIS — M25.611 DECREASED RANGE OF MOTION OF RIGHT SHOULDER: ICD-10-CM

## 2025-07-10 DIAGNOSIS — M19.011 OSTEOARTHRITIS OF RIGHT GLENOHUMERAL JOINT: Primary | ICD-10-CM

## 2025-07-10 PROCEDURE — 97112 NEUROMUSCULAR REEDUCATION: CPT | Mod: CQ

## 2025-07-10 PROCEDURE — 97530 THERAPEUTIC ACTIVITIES: CPT | Mod: CQ

## 2025-07-10 NOTE — PROGRESS NOTES
"  Outpatient Rehab    Physical Therapy Visit    Patient Name: Rebecca Johnston Zollinger  MRN: [id number]  YOB: 1955  Encounter Date: 7/10/2025    Therapy Diagnosis:   Encounter Diagnoses   Name Primary?    Osteoarthritis of right glenohumeral joint Yes    Chronic right shoulder pain     Weakness     Decreased range of motion of right shoulder        Physician: Emanuel Aguilar MD    Physician Orders: Eval and Treat  Medical Diagnosis: Osteoarthritis of right glenohumeral joint  Surgical Diagnosis: Reverse TSA   Surgical Date: 3/19/2025  Days Since Last Surgery: 113    Visit # / Visits Authorized:  24 / 30  Insurance Authorization Period: 1/1/2025 to 12/31/2025  Date of Evaluation: 3/18/2025  Plan of Care Certification: 6/17/2025 to 9/17/2025      PT/PTA: PTA   Number of PTA visits since last PT visit:2  Time In: 1040   Time Out: 1140  Total Time (in minutes): 60   Total Billable Time (in minutes): 30    FOTO:  Intake Score:  %  Survey Score 2:  %  Survey Score 3:  %    Precautions:       Subjective   She is scheduled to get an injection for her thoracic pain next Monday and will have to avoid physical activity for a couple of days..  Pain reported as 0/10.      Objective             Treatment:   THERAPEUTIC EXERCISES to develop strength, endurance, ROM, and flexibility, including:     Pulleys x3' flex/abd/flex ecc  Table slides fwd 2x10 5s with lift  No money w/ RTB 2x10   Seated serratus punch 2x10  ER 2x10 red theraband seated  IR 3x10 green theraband seated  Upper trap stretch 5x15s  Levator scapulae stretch 5x15s  SL abd 2x10   Supine abd x10  Upper back stretch 5x15"  Standing open books 10x B    MANUAL THERAPY TECHNIQUES were applied to Right shoulder :   (R) shld PROM per protocol - abd only  Static cupping to levator scapulae and upper trap 5 min (not included in billed time) - NT  Gentle sup GHJ glides         NEUROMUSCULAR RE-EDUCATION ACTIVITIES to improve Coordination, Proprioception, " Posture, and Motor Control.  The following were included:   SAPD 2x10 green theraband Seated  Flex (up) ->abd (down) AROM 19x   Seated PNF D2/D1 2x10 each un-weighted today with cupping applied to R mid thoracic     therapeutic activities to improve functional performance, including:  Cabinet lifts x10 flex with 1#, x10 flex without weight, x10 scap un-weighted  Rows blue theraband 3x10 seated  Education on time frame for return to normalcy    Time Entry(in minutes):       Assessment & Plan   Assessment: Pt has been having mid back pain for a while that she feels may be interfering with elevating the arm in certain directions. Trial of cupping to R thoracic paraspinal in conjunction with PNF pattern provided no relief to reported area. Incorporated upper back stretch and standing open books for thoracic mobility in clinic and for home use.   Evaluation/Treatment Tolerance: Patient tolerated treatment well    The patient will continue to benefit from skilled outpatient physical therapy in order to address the deficits listed in the problem list on the initial evaluation, provide patient and family education, and maximize the patients level of independence in the home and community environments.     The patient's spiritual, cultural, and educational needs were considered, and the patient is agreeable to the plan of care and goals.           Plan: Continue per POC towards PT goals    Goals:   Active       LTG       Pt will improve FOTO score to </=62% limited to decrease perceived limitation with carrying, moving, and handling objects. (Progressing)       Start:  04/02/25    Expected End:  07/02/25            Pt will increase R shoulder AROM to WFL with in protocol in all planes to improve functional use of R RUE. (Progressing)       Start:  04/02/25    Expected End:  07/02/25            Pt will improve R shoulder MMTs by 1 grade to promote equal use of B UEs in performing functional tasks. (Progressing)       Start:   04/02/25    Expected End:  07/02/25            Pt will lift 20 lb objects without pain to promote functional QOL.  (Progressing)       Start:  04/02/25    Expected End:  07/02/25            Pt to report pain </= 2/10 with ADLs and IADLs using R UE to improve functional QOL.  (Progressing)       Start:  04/02/25    Expected End:  07/02/25              Resolved       STG       Pt will be independent with HEP to supplement PT in improving functional use of R UE.  (Met)       Start:  04/02/25    Expected End:  05/02/25    Resolved:  06/17/25         Pt will increase pain free R shoulder elevation AROM to >/= 90 deg to improve  functional mobility of UE (Met)       Start:  04/02/25    Expected End:  05/02/25    Resolved:  06/17/25         Pt will increase shoulder ER PROM in 30 deg abduction to >/=45 deg to improve functional mobility of UE (Met)       Start:  04/02/25    Expected End:  05/02/25    Resolved:  06/17/25         Pt will improve R shoulder MMTs to 3+/5 to promote equal use of B UEs in performing functional tasks. (Met)       Start:  04/02/25    Expected End:  05/02/25    Resolved:  06/24/25             Jose G Blake, PTA

## 2025-07-11 ENCOUNTER — PATIENT MESSAGE (OUTPATIENT)
Dept: PAIN MEDICINE | Facility: OTHER | Age: 70
End: 2025-07-11
Payer: MEDICARE

## 2025-07-14 ENCOUNTER — PATIENT MESSAGE (OUTPATIENT)
Dept: PAIN MEDICINE | Facility: OTHER | Age: 70
End: 2025-07-14
Payer: MEDICARE

## 2025-07-14 ENCOUNTER — HOSPITAL ENCOUNTER (OUTPATIENT)
Facility: OTHER | Age: 70
Discharge: HOME | End: 2025-07-14
Attending: STUDENT IN AN ORGANIZED HEALTH CARE EDUCATION/TRAINING PROGRAM | Admitting: ANESTHESIOLOGY
Payer: MEDICARE

## 2025-07-14 VITALS
HEIGHT: 64 IN | TEMPERATURE: 98 F | SYSTOLIC BLOOD PRESSURE: 134 MMHG | RESPIRATION RATE: 16 BRPM | BODY MASS INDEX: 27.83 KG/M2 | WEIGHT: 163 LBS | DIASTOLIC BLOOD PRESSURE: 9 MMHG | HEART RATE: 65 BPM | OXYGEN SATURATION: 99 %

## 2025-07-14 DIAGNOSIS — G89.29 CHRONIC PAIN: ICD-10-CM

## 2025-07-14 DIAGNOSIS — M54.14 THORACIC RADICULOPATHY: Primary | ICD-10-CM

## 2025-07-14 PROCEDURE — 25500020 PHARM REV CODE 255: Performed by: STUDENT IN AN ORGANIZED HEALTH CARE EDUCATION/TRAINING PROGRAM

## 2025-07-14 PROCEDURE — 64479 NJX AA&/STRD TFRM EPI C/T 1: CPT | Mod: 50 | Performed by: STUDENT IN AN ORGANIZED HEALTH CARE EDUCATION/TRAINING PROGRAM

## 2025-07-14 PROCEDURE — 64479 NJX AA&/STRD TFRM EPI C/T 1: CPT | Mod: 50,,, | Performed by: STUDENT IN AN ORGANIZED HEALTH CARE EDUCATION/TRAINING PROGRAM

## 2025-07-14 PROCEDURE — 99152 MOD SED SAME PHYS/QHP 5/>YRS: CPT | Mod: ,,, | Performed by: STUDENT IN AN ORGANIZED HEALTH CARE EDUCATION/TRAINING PROGRAM

## 2025-07-14 PROCEDURE — 99152 MOD SED SAME PHYS/QHP 5/>YRS: CPT | Performed by: STUDENT IN AN ORGANIZED HEALTH CARE EDUCATION/TRAINING PROGRAM

## 2025-07-14 PROCEDURE — 63600175 PHARM REV CODE 636 W HCPCS: Performed by: STUDENT IN AN ORGANIZED HEALTH CARE EDUCATION/TRAINING PROGRAM

## 2025-07-14 RX ORDER — DEXAMETHASONE SODIUM PHOSPHATE 10 MG/ML
INJECTION, SOLUTION INTRA-ARTICULAR; INTRALESIONAL; INTRAMUSCULAR; INTRAVENOUS; SOFT TISSUE
Status: DISCONTINUED | OUTPATIENT
Start: 2025-07-14 | End: 2025-07-14 | Stop reason: HOSPADM

## 2025-07-14 RX ORDER — SODIUM CHLORIDE 9 MG/ML
INJECTION, SOLUTION INTRAVENOUS CONTINUOUS
Status: DISCONTINUED | OUTPATIENT
Start: 2025-07-15 | End: 2025-07-14 | Stop reason: HOSPADM

## 2025-07-14 RX ORDER — LIDOCAINE HYDROCHLORIDE 10 MG/ML
INJECTION, SOLUTION EPIDURAL; INFILTRATION; INTRACAUDAL; PERINEURAL
Status: DISCONTINUED | OUTPATIENT
Start: 2025-07-14 | End: 2025-07-14 | Stop reason: HOSPADM

## 2025-07-14 RX ORDER — MIDAZOLAM HYDROCHLORIDE 1 MG/ML
INJECTION INTRAMUSCULAR; INTRAVENOUS
Status: DISCONTINUED | OUTPATIENT
Start: 2025-07-14 | End: 2025-07-14 | Stop reason: HOSPADM

## 2025-07-14 RX ORDER — LIDOCAINE HYDROCHLORIDE 20 MG/ML
INJECTION, SOLUTION INFILTRATION; PERINEURAL
Status: DISCONTINUED | OUTPATIENT
Start: 2025-07-14 | End: 2025-07-14 | Stop reason: HOSPADM

## 2025-07-14 NOTE — DISCHARGE INSTRUCTIONS

## 2025-07-14 NOTE — OP NOTE
Thoracic Transforaminal Epidural Steroid Injection  Bilateral  T7/8 under Fluoroscopic Guidance    The procedure, risks, benefits, and options were discussed with the patient. There are no contraindications to the procedure. The patent expressed understanding and agreed to the procedure. Informed written consent was obtained prior to the start of the procedure and can be found in the patient's chart.    PATIENT NAME: Rebecca Zollinger   MRN: [id number]     DATE OF PROCEDURE: 07/14/2025    PROCEDURE: Thoracic Transforaminal Epidural Steroid Injection Bilateral  T7/8 under Fluoroscopic Guidance    PRE-OP DIAGNOSIS: Thoracic radiculopathy [M54.14] Thoracic radiculopathy [M54.14]    POST-OP DIAGNOSIS: Same    PHYSICIAN: Devon Gamble MD    ASSISTANTS: Paresh Cronin MD     MEDICATIONS INJECTED: Preservative-free Decadron 10mg with 1cc of Lidocaine 1% MPF     LOCAL ANESTHETIC INJECTED: Xylocaine 2%     SEDATION: Versed 2mg and Fentanyl 0mcg                                                                                                                                                                                     Conscious sedation ordered by M.D. Patient re-evaluation prior to administration of conscious sedation. No changes noted in patient's status from initial evaluation. The patient's vital signs were monitored by RN and patient remained hemodynamically stable throughout the procedure.    Event Time In   Sedation Start 1413   Sedation End 1427       ESTIMATED BLOOD LOSS: None    COMPLICATIONS: None    TECHNIQUE: Time-out was performed to identify the patient and procedure to be performed. With the patient laying in the prone position, the surgical area was prepped and draped in the usual sterile fashion using ChloraPrep and a fenestrated drape. The levels were determined under fluoroscopy guidance. Skin anesthesia was achieved by injecting Lidocaine 2% over the injection sites. The transforaminal spaces were then  approached with a 25 gauge, 3.5 inch spinal quinke needle that was introduced under fluoroscopic guidance in the AP and Lateral views. Once the needle tip was in the area of the transforaminal space, and there was no blood, CSF or paraesthesias, contrast dye Omnipaque (300mg/mL) was injected to confirm placement and there was no vascular runoff. Fluoroscopic imaging in the AP and lateral views revealed a clear outline of the spinal nerve with proximal spread of agent through the neural foramen into the epidural space. 3 mL of the medication mixture listed above was injected slowly. Displacement of the radio opaque contrast after injection of the medication confirmed that the medication went into the area of the transforaminal spaces. The needles were removed and bleeding was nil. A sterile dressing was applied. No specimens collected. The patient tolerated the procedure well.     The patient was monitored after the procedure in the recovery area. They were given post-procedure and discharge instructions to follow at home. The patient was discharged in a stable condition.    Devon Gamble MD

## 2025-07-14 NOTE — DISCHARGE SUMMARY
Discharge Note  Short Stay      SUMMARY     Admit Date: 7/14/2025    Attending Physician: Devon Gamble MD PhD    Discharge Physician: Devon Gamble      Discharge Date: 7/14/2025 2:13 PM    Procedure(s) (LRB):  THORACIC TRANSFORAMINAL BILATERAL T7/8 *ASPIRIN OTC* HOLD FOR 5 DAYS *EISSA PT* (Bilateral)    Final Diagnosis: Thoracic radiculopathy [M54.14]    Disposition: Home or self care    Patient Instructions:   Current Discharge Medication List        CONTINUE these medications which have NOT CHANGED    Details   alendronate (FOSAMAX) 70 MG tablet Take 70 mg by mouth every 7 days.      aspirin (ECOTRIN) 81 MG EC tablet Take 81 mg by mouth once daily.      buPROPion (WELLBUTRIN XL) 150 MG TB24 tablet Take 1 tablet by mouth every morning.      celecoxib (CELEBREX) 200 MG capsule TAKE 1 CAPSULE BY MOUTH EVERY DAY  Qty: 30 capsule, Refills: 1    Associated Diagnoses: Osteoarthritis of right glenohumeral joint      cycloSPORINE (RESTASIS) 0.05 % ophthalmic emulsion Place 1 drop into both eyes 3 (three) times daily.      estradioL (ESTRACE) 0.01 % (0.1 mg/gram) vaginal cream Place vaginally once daily.      FLUoxetine 20 MG capsule Take 1 capsule by mouth once daily.      hydrocodone-acetaminophen (HYCET) solution 7.5-325 mg/15mL Take by mouth 4 (four) times daily as needed for Pain.      ipratropium (ATROVENT) 42 mcg (0.06 %) nasal spray 2 sprays by Each Nostril route 4 (four) times daily.      methocarbamoL (ROBAXIN) 500 MG Tab TAKE 1 TABLET BY MOUTH THREE TIMES A DAY AS NEEDED MUSCLE PAIN  Qty: 30 tablet, Refills: 0    Comments: DX Code Needed  .  Associated Diagnoses: Myofascial pain      nitrofurantoin, macrocrystal-monohydrate, (MACROBID) 100 MG capsule Take 100 mg by mouth 2 (two) times daily.      pantoprazole (PROTONIX) 40 MG tablet Take 1 tablet by mouth once daily.      phenazopyridine (PYRIDIUM) 200 MG tablet Take 200 mg by mouth as needed for Pain.      prazosin (MINIPRESS) 1 MG Cap Take 2 capsules by mouth  every evening.      rosuvastatin (CRESTOR) 5 MG tablet Take 1 tablet by mouth every evening.      tiZANidine (ZANAFLEX) 2 MG tablet 1 at bedtime for 3 nights then 2 every night for 3 nights then 3 every night to follow. Stay at the most comfortable dose.  Qty: 90 tablet, Refills: 2      traMADoL (ULTRAM) 50 mg tablet Take 1 tablet (50 mg total) by mouth every 6 (six) hours as needed for Pain.  Qty: 20 tablet, Refills: 0    Associated Diagnoses: Osteoarthritis of right glenohumeral joint; Chronic right shoulder pain           STOP taking these medications       oxyCODONE-acetaminophen (PERCOCET)  mg per tablet Comments:   Reason for Stopping:         promethazine (PHENERGAN) 25 MG tablet Comments:   Reason for Stopping:                   Discharge Diagnosis: Thoracic radiculopathy [M54.14]  Condition on Discharge: Stable with no complications to procedure   Diet on Discharge: Same as before.  Activity: as per instruction sheet.  Discharge to: Home with a responsible adult.  Follow up: 2-4 weeks       Please call my office or pager at [phone] if experienced any weakness or loss of sensation, fever > 101.5, pain uncontrolled with oral medications, persistent nausea/vomiting/or diarrhea, redness or drainage from the incisions, or any other worrisome concerns. If physician on call was not reached or could not communicate with our office for any reason please go to the nearest emergency department      Devon Gamble MD PhD

## 2025-07-17 ENCOUNTER — CLINICAL SUPPORT (OUTPATIENT)
Dept: REHABILITATION | Facility: HOSPITAL | Age: 70
End: 2025-07-17
Payer: MEDICARE

## 2025-07-17 DIAGNOSIS — M19.011 OSTEOARTHRITIS OF RIGHT GLENOHUMERAL JOINT: Primary | ICD-10-CM

## 2025-07-17 DIAGNOSIS — M25.511 CHRONIC RIGHT SHOULDER PAIN: ICD-10-CM

## 2025-07-17 DIAGNOSIS — M25.611 DECREASED RANGE OF MOTION OF RIGHT SHOULDER: ICD-10-CM

## 2025-07-17 DIAGNOSIS — R53.1 WEAKNESS: ICD-10-CM

## 2025-07-17 DIAGNOSIS — G89.29 CHRONIC RIGHT SHOULDER PAIN: ICD-10-CM

## 2025-07-17 PROCEDURE — 97140 MANUAL THERAPY 1/> REGIONS: CPT

## 2025-07-17 PROCEDURE — 97110 THERAPEUTIC EXERCISES: CPT

## 2025-07-17 NOTE — PROGRESS NOTES
Outpatient Rehab    Physical Therapy Progress Note    Patient Name: Rebecca Johnston Zollinger  MRN: [id number]  YOB: 1955  Encounter Date: 7/17/2025    Therapy Diagnosis:   Encounter Diagnoses   Name Primary?    Osteoarthritis of right glenohumeral joint Yes    Chronic right shoulder pain     Weakness     Decreased range of motion of right shoulder      Physician: Emanuel Aguilar MD    Physician Orders: Eval and Treat  Medical Diagnosis: Osteoarthritis of right glenohumeral joint  Surgical Diagnosis: Reverse TSA   Surgical Date: 3/19/2025  Days Since Last Surgery: 120    Visit # / Visits Authorized:  25 / 30  Insurance Authorization Period: 1/1/2025 to 12/31/2025  Date of Evaluation: 3/18/2025  Plan of Care Certification: 6/17/2025 to 9/17/2025      PT/PTA:     Number of PTA visits since last PT visit:   Time In: 0800   Time Out: 0900  Total Time (in minutes): 60   Total Billable Time (in minutes): 40 (thermal modality and non 1:1 time not included)    FOTO:  Intake Score: Not applicable for this Episode%  Survey Score 2: Not applicable for this Episode%  Survey Score 3: Not applicable for this Episode%    Precautions:  Additional Precautions and Protocol Details: Reverse Shoulder Protocol    Subjective   She got the thoracic injection and feels like it is doing well. She keeps getting pain in the lat area and in lateral arm in fwd leaning reaching tasks..  Pain reported as 2/10.      Objective          Shoulder Right Right Right Left Left Pain/Dysfunction with Movement     AROM PROM MMT AROM MMT     Flexion 135 146 3+/5 WNL 5/5     Extension 30 NT 3+/5 WNL 5/5     Abduction 135! 75! 3+/5! WNL 5/5 Most painful MMT and ROM   Adduction NT NT 4-/5 WNL 5/5     Internal rotation sacrum 53 3+/5 Inf bord scap 5/5     External Rotation 65 80 3+/5 WNL 5/5        Elbow Right Right Left Left Pain/Dysfunction with Movement     AROM MMT AROM MMT     Flexion WNL 4+/5 WNL 5/5     Extension WNL 4+/5 WNL 5/5          Strength: R=60#;  L=61#    Treatment:    THERAPEUTIC EXERCISES to develop strength, endurance, ROM, and flexibility, including:     Pulleys x3' flex/abd/flex ecc  Table slides fwd 2x10 5s with lift  No money w/ RTB 2x10   Seated serratus punch 2x10  ER 2x10 red theraband seated  IR 3x10 green theraband seated  Upper trap stretch 5x15s  Levator scapulae stretch 5x15s  SL abd 2x10 - NT  Supine abd x10 - NT       MANUAL THERAPY TECHNIQUES were applied to Right shoulder :   (R) shld PROM per protocol - abd only  Static cupping to levator scapulae and upper trap 5 min (not included in billed time) - NT  Gentle sup GHJ glides         NEUROMUSCULAR RE-EDUCATION ACTIVITIES to improve Coordination, Proprioception, Posture, and Motor Control.  The following were included:   SAPD 2x10 green theraband Seated  Flex (up) ->abd (down) AROM 19x   Seated PNF D2/D1 2x10 - NT  SAPD with ER 2x10     therapeutic activities to improve functional performance, including:  Cabinet lifts x10 flex with 1#, x10 flex without weight, x10 scap un-weighted - NT  Rows 7# cc 3x10 seated    Time Entry(in minutes):  Manual Therapy Time Entry: 10  Neuromuscular Re-Education Time Entry: 10  Therapeutic Activity Time Entry: 4  Therapeutic Exercise Time Entry: 30    Assessment & Plan   Assessment: Pt presents with significant improvements in subjective reports and objective measures as compared to initial evaluation as indicated above.  Able to address thoracic pain with lower trap activations so progressed to HEP. Able to progress strengthening tasks some today with good response.        The patient will continue to benefit from skilled outpatient physical therapy in order to address the deficits listed in the problem list on the initial evaluation, provide patient and family education, and maximize the patients level of independence in the home and community environments.     The patient's spiritual, cultural, and educational needs were  considered, and the patient is agreeable to the plan of care and goals.           Plan: Continue per POC towards PT goals    Goals:   Active       LTG       Pt will improve FOTO score to </=62% limited to decrease perceived limitation with carrying, moving, and handling objects. (Progressing)       Start:  04/02/25    Expected End:  07/02/25            Pt will increase R shoulder AROM to WFL with in protocol in all planes to improve functional use of R RUE. (Progressing)       Start:  04/02/25    Expected End:  07/02/25            Pt will improve R shoulder MMTs by 1 grade to promote equal use of B UEs in performing functional tasks. (Progressing)       Start:  04/02/25    Expected End:  07/02/25            Pt will lift 20 lb objects without pain to promote functional QOL.  (Progressing)       Start:  04/02/25    Expected End:  07/02/25            Pt to report pain </= 2/10 with ADLs and IADLs using R UE to improve functional QOL.  (Progressing)       Start:  04/02/25    Expected End:  07/02/25              Resolved       STG       Pt will be independent with HEP to supplement PT in improving functional use of R UE.  (Met)       Start:  04/02/25    Expected End:  05/02/25    Resolved:  06/17/25         Pt will increase pain free R shoulder elevation AROM to >/= 90 deg to improve  functional mobility of UE (Met)       Start:  04/02/25    Expected End:  05/02/25    Resolved:  06/17/25         Pt will increase shoulder ER PROM in 30 deg abduction to >/=45 deg to improve functional mobility of UE (Met)       Start:  04/02/25    Expected End:  05/02/25    Resolved:  06/17/25         Pt will improve R shoulder MMTs to 3+/5 to promote equal use of B UEs in performing functional tasks. (Met)       Start:  04/02/25    Expected End:  05/02/25    Resolved:  06/24/25             Guru Milian, PT, DPT

## 2025-07-19 ENCOUNTER — PATIENT MESSAGE (OUTPATIENT)
Dept: PAIN MEDICINE | Facility: OTHER | Age: 70
End: 2025-07-19
Payer: MEDICARE

## 2025-07-22 ENCOUNTER — TELEPHONE (OUTPATIENT)
Dept: PAIN MEDICINE | Facility: CLINIC | Age: 70
End: 2025-07-22
Payer: MEDICARE

## 2025-07-22 ENCOUNTER — CLINICAL SUPPORT (OUTPATIENT)
Dept: REHABILITATION | Facility: HOSPITAL | Age: 70
End: 2025-07-22
Payer: MEDICARE

## 2025-07-22 DIAGNOSIS — R53.1 WEAKNESS: ICD-10-CM

## 2025-07-22 DIAGNOSIS — M25.611 DECREASED RANGE OF MOTION OF RIGHT SHOULDER: ICD-10-CM

## 2025-07-22 DIAGNOSIS — M25.511 CHRONIC RIGHT SHOULDER PAIN: ICD-10-CM

## 2025-07-22 DIAGNOSIS — G89.29 CHRONIC RIGHT SHOULDER PAIN: ICD-10-CM

## 2025-07-22 DIAGNOSIS — M19.011 OSTEOARTHRITIS OF RIGHT GLENOHUMERAL JOINT: Primary | ICD-10-CM

## 2025-07-22 PROCEDURE — 97530 THERAPEUTIC ACTIVITIES: CPT

## 2025-07-22 PROCEDURE — 97110 THERAPEUTIC EXERCISES: CPT

## 2025-07-22 PROCEDURE — 97112 NEUROMUSCULAR REEDUCATION: CPT

## 2025-07-22 NOTE — TELEPHONE ENCOUNTER
Staff attempted to contact the patient to inform that their upcoming procedure will need to be canceled and rescheduled with  or  due to  being out of the office. A brief voicemail was left, and a message was also sent via the patient portal.

## 2025-07-22 NOTE — PROGRESS NOTES
Outpatient Rehab    Physical Therapy Visit    Patient Name: Rebecca Johnston Zollinger  MRN: [id number]  YOB: 1955  Encounter Date: 7/22/2025    Therapy Diagnosis:   Encounter Diagnoses   Name Primary?    Osteoarthritis of right glenohumeral joint Yes    Chronic right shoulder pain     Weakness     Decreased range of motion of right shoulder      Physician: Emanuel Aguilar MD    Physician Orders: Eval and Treat  Medical Diagnosis: Osteoarthritis of right glenohumeral joint  Surgical Diagnosis: Reverse TSA   Surgical Date: 3/19/2025  Days Since Last Surgery: 125    Visit # / Visits Authorized:  26 / 30  Insurance Authorization Period: 1/1/2025 to 12/31/2025  Date of Evaluation: 3/18/2025  Plan of Care Certification: 6/17/2025 to 9/17/2025      PT/PTA:     Number of PTA visits since last PT visit:   Time In: 1052   Time Out: 1153  Total Time (in minutes): 61   Total Billable Time (in minutes): 61    FOTO:  Intake Score (%): Not applicable for this Episode  Survey Score 2 (%): Not applicable for this Episode  Survey Score 3 (%): Not applicable for this Episode    Precautions:  Additional Precautions and Protocol Details: Reverse Shoulder Protocol      Subjective   She has been feeling pretty good with less pain but it is still giving her some trouble. She went to exercise class today and is feeling tired now..  Pain reported as 2/10.      Objective          From visit on 7/17  Shoulder Right Right Right Left Left Pain/Dysfunction with Movement     AROM PROM MMT AROM MMT     Flexion 135 146 3+/5 WNL 5/5     Extension 30 NT 3+/5 WNL 5/5     Abduction 135! 75! 3+/5! WNL 5/5 Most painful MMT and ROM   Adduction NT NT 4-/5 WNL 5/5     Internal rotation sacrum 53 3+/5 Inf bord scap 5/5     External Rotation 65 80 3+/5 WNL 5/5        Elbow Right Right Left Left Pain/Dysfunction with Movement     AROM MMT AROM MMT     Flexion WNL 4+/5 WNL 5/5     Extension WNL 4+/5 WNL 5/5         Strength: R=60#;   L=61#    Treatment:    THERAPEUTIC EXERCISES to develop strength, endurance, ROM, and flexibility, including:     Pulleys x3' flex/abd/flex ecc  Wall slides fwd 2x10 5s with lift  No money w/ RTB 2x10   Seated serratus punch 2x10  ER x10 RTB and x10 GTB seated  IR 3x10 green theraband seated  Upper trap stretch 5x15s  Levator scapulae stretch 5x15s  SL abd 2x10 - NT  Supine abd x10 - NT  Supine pec stretch x2'       MANUAL THERAPY TECHNIQUES were applied to Right shoulder :   (R) shld PROM per protocol - abd only  Static cupping to levator scapulae and upper trap 5 min (not included in billed time) - NT  Gentle sup GHJ glides         NEUROMUSCULAR RE-EDUCATION ACTIVITIES to improve Coordination, Proprioception, Posture, and Motor Control.  The following were included:   Flex (up) ->abd (down) AROM 19x - NT  Seated PNF D2/D1 2x10 - NT  SAPD with ER 2x10 Seated  Lower trap activations 3x10, 3s     therapeutic activities to improve functional performance, including:  Cabinet lifts 1# flex, scap, abd x10 each  Rows 7# cc 3x10 seated       Time Entry(in minutes):  Manual Therapy Time Entry: 8  Neuromuscular Re-Education Time Entry: 8  Therapeutic Activity Time Entry: 8  Therapeutic Exercise Time Entry: 30    Assessment & Plan   Assessment: Pt with dec'd ER AROM at 0* that was improved after supine pec stretch. Able to progress overhead motions with good response and no adverse effects.   Evaluation/Treatment Tolerance: Patient tolerated treatment well    The patient will continue to benefit from skilled outpatient physical therapy in order to address the deficits listed in the problem list on the initial evaluation, provide patient and family education, and maximize the patients level of independence in the home and community environments.     The patient's spiritual, cultural, and educational needs were considered, and the patient is agreeable to the plan of care and goals.           Plan: Monitor response and progress  as tolerated.    Goals:   Active       LTG       Pt will improve FOTO score to </=62% limited to decrease perceived limitation with carrying, moving, and handling objects. (Progressing)       Start:  04/02/25    Expected End:  07/02/25            Pt will increase R shoulder AROM to WFL with in protocol in all planes to improve functional use of R RUE. (Progressing)       Start:  04/02/25    Expected End:  07/02/25            Pt will improve R shoulder MMTs by 1 grade to promote equal use of B UEs in performing functional tasks. (Progressing)       Start:  04/02/25    Expected End:  07/02/25            Pt will lift 20 lb objects without pain to promote functional QOL.  (Progressing)       Start:  04/02/25    Expected End:  07/02/25            Pt to report pain </= 2/10 with ADLs and IADLs using R UE to improve functional QOL.  (Progressing)       Start:  04/02/25    Expected End:  07/02/25              Resolved       STG       Pt will be independent with HEP to supplement PT in improving functional use of R UE.  (Met)       Start:  04/02/25    Expected End:  05/02/25    Resolved:  06/17/25         Pt will increase pain free R shoulder elevation AROM to >/= 90 deg to improve  functional mobility of UE (Met)       Start:  04/02/25    Expected End:  05/02/25    Resolved:  06/17/25         Pt will increase shoulder ER PROM in 30 deg abduction to >/=45 deg to improve functional mobility of UE (Met)       Start:  04/02/25    Expected End:  05/02/25    Resolved:  06/17/25         Pt will improve R shoulder MMTs to 3+/5 to promote equal use of B UEs in performing functional tasks. (Met)       Start:  04/02/25    Expected End:  05/02/25    Resolved:  06/24/25             Guru Milian, PT, DPT

## 2025-07-24 ENCOUNTER — CLINICAL SUPPORT (OUTPATIENT)
Dept: REHABILITATION | Facility: HOSPITAL | Age: 70
End: 2025-07-24
Payer: MEDICARE

## 2025-07-24 DIAGNOSIS — G89.29 CHRONIC RIGHT SHOULDER PAIN: ICD-10-CM

## 2025-07-24 DIAGNOSIS — M19.011 OSTEOARTHRITIS OF RIGHT GLENOHUMERAL JOINT: Primary | ICD-10-CM

## 2025-07-24 DIAGNOSIS — M25.511 CHRONIC RIGHT SHOULDER PAIN: ICD-10-CM

## 2025-07-24 DIAGNOSIS — R53.1 WEAKNESS: ICD-10-CM

## 2025-07-24 DIAGNOSIS — M25.611 DECREASED RANGE OF MOTION OF RIGHT SHOULDER: ICD-10-CM

## 2025-07-24 PROCEDURE — 97530 THERAPEUTIC ACTIVITIES: CPT

## 2025-07-24 PROCEDURE — 97110 THERAPEUTIC EXERCISES: CPT

## 2025-07-24 PROCEDURE — 97112 NEUROMUSCULAR REEDUCATION: CPT

## 2025-07-25 ENCOUNTER — TELEPHONE (OUTPATIENT)
Dept: PAIN MEDICINE | Facility: CLINIC | Age: 70
End: 2025-07-25
Payer: MEDICARE

## 2025-07-25 NOTE — TELEPHONE ENCOUNTER
Copied from CRM #[id number]. Topic: General Inquiry - Patient Advice  >> Jul 25, 2025  3:27 PM Malini wrote:  Type: Patient Call Back    Who called: Pt     What is the request in detail: pt is requesting a call back regarding reschedule procedure appt.Please contact to further discuss and advise.        Can the clinic reply by [facility]? N    Would the patient rather a call back or a response via [facility]? call    Best call back number:.[phone]

## 2025-07-25 NOTE — TELEPHONE ENCOUNTER
Staff spoke with patient to assist with rescheduling in clinic procedure due to  being out of office. Staff informed patient that the procedure will be scheduled with another physician in clinic due to  being out of office for a period of time. Patient verbalized understanding and agreement. Patient was scheduled for Dr.John etienne availability

## 2025-07-29 ENCOUNTER — CLINICAL SUPPORT (OUTPATIENT)
Dept: REHABILITATION | Facility: HOSPITAL | Age: 70
End: 2025-07-29
Payer: MEDICARE

## 2025-07-29 DIAGNOSIS — G89.29 CHRONIC RIGHT SHOULDER PAIN: ICD-10-CM

## 2025-07-29 DIAGNOSIS — M19.011 OSTEOARTHRITIS OF RIGHT GLENOHUMERAL JOINT: Primary | ICD-10-CM

## 2025-07-29 DIAGNOSIS — M25.611 DECREASED RANGE OF MOTION OF RIGHT SHOULDER: ICD-10-CM

## 2025-07-29 DIAGNOSIS — M25.511 CHRONIC RIGHT SHOULDER PAIN: ICD-10-CM

## 2025-07-29 DIAGNOSIS — R53.1 WEAKNESS: ICD-10-CM

## 2025-07-29 PROCEDURE — 97530 THERAPEUTIC ACTIVITIES: CPT

## 2025-07-29 PROCEDURE — 97110 THERAPEUTIC EXERCISES: CPT

## 2025-07-29 PROCEDURE — 97112 NEUROMUSCULAR REEDUCATION: CPT

## 2025-07-30 NOTE — PROGRESS NOTES
Outpatient Rehab    Physical Therapy Visit    Patient Name: Rebecca Johnston Zollinger  MRN: [id number]  YOB: 1955  Encounter Date: 7/29/2025    Therapy Diagnosis:   Encounter Diagnoses   Name Primary?    Osteoarthritis of right glenohumeral joint Yes    Chronic right shoulder pain     Weakness     Decreased range of motion of right shoulder        Physician: Emanuel Aguilar MD    Physician Orders: Eval and Treat  Medical Diagnosis: Osteoarthritis of right glenohumeral joint  Surgical Diagnosis: Reverse TSA   Surgical Date: 3/19/2025  Days Since Last Surgery: 133    Visit # / Visits Authorized:  28 / 50  Insurance Authorization Period: 1/1/2025 to 12/31/2025  Date of Evaluation: 3/18/2025  Plan of Care Certification: 6/17/2025 to 9/17/2025      PT/PTA:     Number of PTA visits since last PT visit:   Time In: 0758   Time Out: 0858  Total Time (in minutes): 60   Total Billable Time (in minutes): 60    FOTO:  Intake Score (%): Not applicable for this Episode  Survey Score 2 (%): Not applicable for this Episode  Survey Score 3 (%): Not applicable for this Episode    Precautions:  Additional Precautions and Protocol Details: Reverse Shoulder Protocol      Subjective   She has started with a little pain in the shoulder blade area but overall is doing okay..  Pain reported as 2/10.      Objective          From visit on 7/17  Shoulder Right Right Right Left Left Pain/Dysfunction with Movement     AROM PROM MMT AROM MMT     Flexion 135 146 3+/5 WNL 5/5     Extension 30 NT 3+/5 WNL 5/5     Abduction 135! 75! 3+/5! WNL 5/5 Most painful MMT and ROM   Adduction NT NT 4-/5 WNL 5/5     Internal rotation sacrum 53 3+/5 Inf bord scap 5/5     External Rotation 65 80 3+/5 WNL 5/5        Elbow Right Right Left Left Pain/Dysfunction with Movement     AROM MMT AROM MMT     Flexion WNL 4+/5 WNL 5/5     Extension WNL 4+/5 WNL 5/5         Strength: R=60#;  L=61#    Treatment:    THERAPEUTIC EXERCISES to develop  strength, endurance, ROM, and flexibility, including:     Pulleys x3' flex/abd/flex ecc  Wall slides fwd 2x10 5s with lift - NT  No money w/ RTB 2x10   Seated serratus punch 2x10 - NT  ER 2x10 GTB seated  IR 3x10 BLUE theraband seated  Upper trap stretch 5x15s  Levator scapulae stretch 5x15s  SL abd 2x10 - NT  Supine abd x10 - NT  Supine pec stretch x2'       MANUAL THERAPY TECHNIQUES were applied to Right shoulder :   (R) shld PROM - abd only  Static cupping to levator scapulae and upper trap 5 min (not included in billed time) - NT  Gentle sup GHJ glides  - NT        NEUROMUSCULAR RE-EDUCATION ACTIVITIES to improve Coordination, Proprioception, Posture, and Motor Control.  The following were included:   Flex (up) ->abd (down) AROM x20 - NT  Seated PNF D2/D1 2x10 1#  SAPD with ER 2x10 Seated  Lower trap activations 3x10, 3s     therapeutic activities to improve functional performance, including:  Cabinet lifts 1# flex, scap, abd x10 each  Rows 7# cc 3x10 seated  Body blade through pickle ball vahid ROM 3x20s  Flex through pickleball vahid red theraband 2x10  Cross body HA with red theraband (backhand) 2x10         Time Entry(in minutes):  Neuromuscular Re-Education Time Entry: 8  Therapeutic Activity Time Entry: 21  Therapeutic Exercise Time Entry: 30    Assessment & Plan   Assessment: Practiced pickle ball vahid and backhand today with fairly good tolerance. Strengthened through this ROM with fairly good tolerance but significant weakness noted.   Evaluation/Treatment Tolerance: Patient tolerated treatment well    The patient will continue to benefit from skilled outpatient physical therapy in order to address the deficits listed in the problem list on the initial evaluation, provide patient and family education, and maximize the patients level of independence in the home and community environments.     The patient's spiritual, cultural, and educational needs were considered, and the patient is agreeable  to the plan of care and goals.           Plan: Monitor response and progress as tolerated.    Goals:   Active       LTG       Pt will improve FOTO score to </=62% limited to decrease perceived limitation with carrying, moving, and handling objects. (Progressing)       Start:  04/02/25    Expected End:  07/02/25            Pt will increase R shoulder AROM to WFL with in protocol in all planes to improve functional use of R RUE. (Progressing)       Start:  04/02/25    Expected End:  07/02/25            Pt will improve R shoulder MMTs by 1 grade to promote equal use of B UEs in performing functional tasks. (Progressing)       Start:  04/02/25    Expected End:  07/02/25            Pt will lift 20 lb objects without pain to promote functional QOL.  (Progressing)       Start:  04/02/25    Expected End:  07/02/25            Pt to report pain </= 2/10 with ADLs and IADLs using R UE to improve functional QOL.  (Progressing)       Start:  04/02/25    Expected End:  07/02/25              Resolved       STG       Pt will be independent with HEP to supplement PT in improving functional use of R UE.  (Met)       Start:  04/02/25    Expected End:  05/02/25    Resolved:  06/17/25         Pt will increase pain free R shoulder elevation AROM to >/= 90 deg to improve  functional mobility of UE (Met)       Start:  04/02/25    Expected End:  05/02/25    Resolved:  06/17/25         Pt will increase shoulder ER PROM in 30 deg abduction to >/=45 deg to improve functional mobility of UE (Met)       Start:  04/02/25    Expected End:  05/02/25    Resolved:  06/17/25         Pt will improve R shoulder MMTs to 3+/5 to promote equal use of B UEs in performing functional tasks. (Met)       Start:  04/02/25    Expected End:  05/02/25    Resolved:  06/24/25               Guru Milian, PT, DPT

## 2025-07-31 ENCOUNTER — CLINICAL SUPPORT (OUTPATIENT)
Dept: REHABILITATION | Facility: HOSPITAL | Age: 70
End: 2025-07-31
Payer: MEDICARE

## 2025-07-31 DIAGNOSIS — M25.511 CHRONIC RIGHT SHOULDER PAIN: ICD-10-CM

## 2025-07-31 DIAGNOSIS — R53.1 WEAKNESS: ICD-10-CM

## 2025-07-31 DIAGNOSIS — G89.29 CHRONIC RIGHT SHOULDER PAIN: ICD-10-CM

## 2025-07-31 DIAGNOSIS — M25.611 DECREASED RANGE OF MOTION OF RIGHT SHOULDER: ICD-10-CM

## 2025-07-31 DIAGNOSIS — M19.011 OSTEOARTHRITIS OF RIGHT GLENOHUMERAL JOINT: Primary | ICD-10-CM

## 2025-07-31 PROCEDURE — 97112 NEUROMUSCULAR REEDUCATION: CPT

## 2025-07-31 PROCEDURE — 97530 THERAPEUTIC ACTIVITIES: CPT

## 2025-07-31 PROCEDURE — 97110 THERAPEUTIC EXERCISES: CPT

## 2025-07-31 NOTE — PROGRESS NOTES
Outpatient Rehab    Physical Therapy Visit    Patient Name: Rebecca Johnston Zollinger  MRN: [id number]  YOB: 1955  Encounter Date: 7/31/2025    Therapy Diagnosis:   Encounter Diagnoses   Name Primary?    Osteoarthritis of right glenohumeral joint Yes    Chronic right shoulder pain     Weakness     Decreased range of motion of right shoulder        Physician: Emanuel Aguilar MD    Physician Orders: Eval and Treat  Medical Diagnosis: Osteoarthritis of right glenohumeral joint  Surgical Diagnosis: Reverse TSA   Surgical Date: 3/19/2025  Days Since Last Surgery: 134    Visit # / Visits Authorized:  29 / 50  Insurance Authorization Period: 1/1/2025 to 12/31/2025  Date of Evaluation: 3/18/2025  Plan of Care Certification: 6/17/2025 to 9/17/2025      PT/PTA:     Number of PTA visits since last PT visit:   Time In: 0801   Time Out: 0900  Total Time (in minutes): 59   Total Billable Time (in minutes): 54    FOTO:  Intake Score (%): Not applicable for this Episode  Survey Score 2 (%): Not applicable for this Episode  Survey Score 3 (%): Not applicable for this Episode    Precautions:  Additional Precautions and Protocol Details: Reverse Shoulder Protocol      Subjective   She ried hitting the pickleball a little (5 min on R UE and 30 min on L UE) and was sore in her back but feels like her shoulder did well. She feels like the sharp pains are becoming less and less frequent..  Pain reported as 2/10.      Objective          From visit on 7/17  Shoulder Right Right Right Left Left Pain/Dysfunction with Movement     AROM PROM MMT AROM MMT     Flexion 135 146 3+/5 WNL 5/5     Extension 30 NT 3+/5 WNL 5/5     Abduction 135! 75! 3+/5! WNL 5/5 Most painful MMT and ROM   Adduction NT NT 4-/5 WNL 5/5     Internal rotation sacrum 53 3+/5 Inf bord scap 5/5     External Rotation 65 80 3+/5 WNL 5/5        Elbow Right Right Left Left Pain/Dysfunction with Movement     AROM MMT AROM MMT     Flexion WNL 4+/5 WNL 5/5      Extension WNL 4+/5 WNL 5/5         Strength: R=60#;  L=61#    Treatment:    THERAPEUTIC EXERCISES to develop strength, endurance, ROM, and flexibility, including:     Pulleys x3' flex/abd/flex ecc  Wall slides fwd 2x10 5s with lift - NT  No money w/ RTB 2x10   Seated serratus punch 2x10 - NT  ER 2x10 GTB seated  IR 3x10 BLUE theraband seated  Upper trap stretch 5x15s  Levator scapulae stretch 5x15s  SL abd 2x10 - NT  Supine abd x10 - NT  Supine pec stretch x2'       MANUAL THERAPY TECHNIQUES were applied to Right shoulder :   (R) shld PROM - abd only  Static cupping to levator scapulae and upper trap 5 min (not included in billed time) - NT  Gentle sup GHJ glides  - NT        NEUROMUSCULAR RE-EDUCATION ACTIVITIES to improve Coordination, Proprioception, Posture, and Motor Control.  The following were included:   Flex (up) ->abd (down) AROM x20 - NT  Seated PNF D2/D1 2x10 1#  SAPD with ER 3x10   Lower trap activations 3x10, 3s     therapeutic activities to improve functional performance, including:  Cabinet lifts 1# scap, abd x10 each  Rows 7# cc 3x10 seated  Body blade through pickle ball vahid ROM 3x20s  Flex through pickleball vahid red theraband 2x10  Cross body HA with red theraband (backhand) 2x10  Farmer's carry 10# 2x1' (15# next session)  Flex overhead hold 1# 2x30s         Time Entry(in minutes):  Hot/Cold Pack Time Entry: 5 (NOT INCLUDED IN BILLED TIME)  Neuromuscular Re-Education Time Entry: 10  Therapeutic Activity Time Entry: 23  Therapeutic Exercise Time Entry: 21    Assessment & Plan   Assessment: Able to further progress functional strengthening today with good response and no adverse effects.   Evaluation/Treatment Tolerance: Patient tolerated treatment well    The patient will continue to benefit from skilled outpatient physical therapy in order to address the deficits listed in the problem list on the initial evaluation, provide patient and family education, and maximize the patients  level of independence in the home and community environments.     The patient's spiritual, cultural, and educational needs were considered, and the patient is agreeable to the plan of care and goals.           Plan: Monitor response and progress as tolerated.    Goals:   Active       LTG       Pt will improve FOTO score to </=62% limited to decrease perceived limitation with carrying, moving, and handling objects. (Progressing)       Start:  04/02/25    Expected End:  07/02/25            Pt will increase R shoulder AROM to WFL with in protocol in all planes to improve functional use of R RUE. (Progressing)       Start:  04/02/25    Expected End:  07/02/25            Pt will improve R shoulder MMTs by 1 grade to promote equal use of B UEs in performing functional tasks. (Progressing)       Start:  04/02/25    Expected End:  07/02/25            Pt will lift 20 lb objects without pain to promote functional QOL.  (Progressing)       Start:  04/02/25    Expected End:  07/02/25            Pt to report pain </= 2/10 with ADLs and IADLs using R UE to improve functional QOL.  (Progressing)       Start:  04/02/25    Expected End:  07/02/25              Resolved       STG       Pt will be independent with HEP to supplement PT in improving functional use of R UE.  (Met)       Start:  04/02/25    Expected End:  05/02/25    Resolved:  06/17/25         Pt will increase pain free R shoulder elevation AROM to >/= 90 deg to improve  functional mobility of UE (Met)       Start:  04/02/25    Expected End:  05/02/25    Resolved:  06/17/25         Pt will increase shoulder ER PROM in 30 deg abduction to >/=45 deg to improve functional mobility of UE (Met)       Start:  04/02/25    Expected End:  05/02/25    Resolved:  06/17/25         Pt will improve R shoulder MMTs to 3+/5 to promote equal use of B UEs in performing functional tasks. (Met)       Start:  04/02/25    Expected End:  05/02/25    Resolved:  06/24/25               Guru  Maicol, PT, DPT

## 2025-08-05 ENCOUNTER — CLINICAL SUPPORT (OUTPATIENT)
Dept: REHABILITATION | Facility: HOSPITAL | Age: 70
End: 2025-08-05
Payer: MEDICARE

## 2025-08-05 DIAGNOSIS — M25.611 DECREASED RANGE OF MOTION OF RIGHT SHOULDER: ICD-10-CM

## 2025-08-05 DIAGNOSIS — M19.011 OSTEOARTHRITIS OF RIGHT GLENOHUMERAL JOINT: Primary | ICD-10-CM

## 2025-08-05 DIAGNOSIS — G89.29 CHRONIC RIGHT SHOULDER PAIN: ICD-10-CM

## 2025-08-05 DIAGNOSIS — M25.511 CHRONIC RIGHT SHOULDER PAIN: ICD-10-CM

## 2025-08-05 DIAGNOSIS — R53.1 WEAKNESS: ICD-10-CM

## 2025-08-05 PROCEDURE — 97530 THERAPEUTIC ACTIVITIES: CPT

## 2025-08-05 NOTE — PROGRESS NOTES
Outpatient Rehab    Physical Therapy Visit    Patient Name: Rebecca Johnston Zollinger  MRN: [id number]  YOB: 1955  Encounter Date: 8/5/2025    Therapy Diagnosis:   Encounter Diagnoses   Name Primary?    Osteoarthritis of right glenohumeral joint Yes    Chronic right shoulder pain     Weakness     Decreased range of motion of right shoulder        Physician: Emanuel Aguilar MD    Physician Orders: Eval and Treat  Medical Diagnosis: Osteoarthritis of right glenohumeral joint  Surgical Diagnosis: Reverse TSA   Surgical Date: 3/19/2025  Days Since Last Surgery: 139    Visit # / Visits Authorized:  30 / 50  Insurance Authorization Period: 1/1/2025 to 12/31/2025  Date of Evaluation: 3/18/2025  Plan of Care Certification: 6/17/2025 to 9/17/2025      PT/PTA:     Number of PTA visits since last PT visit:   Time In: 0801   Time Out: 0859  Total Time (in minutes): 58   Total Billable Time (in minutes): 15 (some time not billed d/t 1:1 guidelines)    FOTO:  Intake Score (%): Not applicable for this Episode  Survey Score 2 (%): Not applicable for this Episode  Survey Score 3 (%): Not applicable for this Episode    Precautions:  Additional Precautions and Protocol Details: Reverse Shoulder Protocol      Subjective   She tried hitting the pickleball a little over the weekend but had a little extra pain so.  Pain reported as 2/10.      Objective          From visit on 7/17  Shoulder Right Right Right Left Left Pain/Dysfunction with Movement     AROM PROM MMT AROM MMT     Flexion 135 146 3+/5 WNL 5/5     Extension 30 NT 3+/5 WNL 5/5     Abduction 135! 75! 3+/5! WNL 5/5 Most painful MMT and ROM   Adduction NT NT 4-/5 WNL 5/5     Internal rotation sacrum 53 3+/5 Inf bord scap 5/5     External Rotation 65 80 3+/5 WNL 5/5        Elbow Right Right Left Left Pain/Dysfunction with Movement     AROM MMT AROM MMT     Flexion WNL 4+/5 WNL 5/5     Extension WNL 4+/5 WNL 5/5         Strength: R=60#;   L=61#    Treatment:    THERAPEUTIC EXERCISES to develop strength, endurance, ROM, and flexibility, including:     Pulleys x3' flex/abd/flex ecc  Wall slides fwd 2x10 5s with lift - NT  No money w/ RTB 2x10   Seated serratus punch 2x10 - NT  ER 2x10 GTB seated  IR 3x10 BLUE theraband seated  Upper trap stretch 5x15s  Levator scapulae stretch 5x15s  SL abd 2x10 - NT  Supine abd x10 - NT  Supine pec stretch x2' 1/2 foam       MANUAL THERAPY TECHNIQUES were applied to Right shoulder : NOT TODAY  (R) shld PROM - abd only  Static cupping to levator scapulae and upper trap 5 min (not included in billed time) - NT  Gentle sup GHJ glides  - NT        NEUROMUSCULAR RE-EDUCATION ACTIVITIES to improve Coordination, Proprioception, Posture, and Motor Control.  The following were included:   Flex (up) ->abd (down) AROM x20 - NT  Seated PNF D2/D1 2x10 1#  SAPD with ER 3x10   Lower trap activations 3x10, 3s     therapeutic activities to improve functional performance, including:  Cabinet lifts 1# scap, abd x10 each  Rows 7# cc 3x10 seated  Body blade through pickle ball vahid ROM 3x20s  Flex through pickleball vahid red theraband 2x10  Cross body HA with red theraband (backhand) 2x10  Farmer's carry 10# 2x1' (15# next session)  Flex overhead hold 1# 2x30s         Time Entry(in minutes):  Neuromuscular Re-Education Time Entry: 10  Therapeutic Activity Time Entry: 20  Therapeutic Exercise Time Entry: 25    Assessment & Plan   Assessment: Pt is tolerating sessions well today without adverse effects. Plan to progress further next session.   Evaluation/Treatment Tolerance: Patient tolerated treatment well    The patient will continue to benefit from skilled outpatient physical therapy in order to address the deficits listed in the problem list on the initial evaluation, provide patient and family education, and maximize the patients level of independence in the home and community environments.     The patient's spiritual,  cultural, and educational needs were considered, and the patient is agreeable to the plan of care and goals.           Plan: Monitor response and progress as tolerated.    Goals:   Active       LTG       Pt will improve FOTO score to </=62% limited to decrease perceived limitation with carrying, moving, and handling objects. (Progressing)       Start:  04/02/25    Expected End:  07/02/25            Pt will increase R shoulder AROM to WFL with in protocol in all planes to improve functional use of R RUE. (Progressing)       Start:  04/02/25    Expected End:  07/02/25            Pt will improve R shoulder MMTs by 1 grade to promote equal use of B UEs in performing functional tasks. (Progressing)       Start:  04/02/25    Expected End:  07/02/25            Pt will lift 20 lb objects without pain to promote functional QOL.  (Progressing)       Start:  04/02/25    Expected End:  07/02/25            Pt to report pain </= 2/10 with ADLs and IADLs using R UE to improve functional QOL.  (Progressing)       Start:  04/02/25    Expected End:  07/02/25              Resolved       STG       Pt will be independent with HEP to supplement PT in improving functional use of R UE.  (Met)       Start:  04/02/25    Expected End:  05/02/25    Resolved:  06/17/25         Pt will increase pain free R shoulder elevation AROM to >/= 90 deg to improve  functional mobility of UE (Met)       Start:  04/02/25    Expected End:  05/02/25    Resolved:  06/17/25         Pt will increase shoulder ER PROM in 30 deg abduction to >/=45 deg to improve functional mobility of UE (Met)       Start:  04/02/25    Expected End:  05/02/25    Resolved:  06/17/25         Pt will improve R shoulder MMTs to 3+/5 to promote equal use of B UEs in performing functional tasks. (Met)       Start:  04/02/25    Expected End:  05/02/25    Resolved:  06/24/25               Guru Milian, PT, DPT

## 2025-08-06 ENCOUNTER — PROCEDURE VISIT (OUTPATIENT)
Dept: PAIN MEDICINE | Facility: CLINIC | Age: 70
End: 2025-08-06
Payer: MEDICARE

## 2025-08-06 VITALS
OXYGEN SATURATION: 99 % | WEIGHT: 160.94 LBS | HEART RATE: 91 BPM | HEIGHT: 64 IN | TEMPERATURE: 98 F | DIASTOLIC BLOOD PRESSURE: 69 MMHG | BODY MASS INDEX: 27.48 KG/M2 | RESPIRATION RATE: 18 BRPM | SYSTOLIC BLOOD PRESSURE: 124 MMHG

## 2025-08-06 DIAGNOSIS — M70.60 GREATER TROCHANTERIC BURSITIS, UNSPECIFIED LATERALITY: Primary | ICD-10-CM

## 2025-08-06 PROCEDURE — 20611 DRAIN/INJ JOINT/BURSA W/US: CPT | Mod: LT,S$GLB,, | Performed by: STUDENT IN AN ORGANIZED HEALTH CARE EDUCATION/TRAINING PROGRAM

## 2025-08-06 PROCEDURE — 99499 UNLISTED E&M SERVICE: CPT | Mod: S$GLB,,, | Performed by: STUDENT IN AN ORGANIZED HEALTH CARE EDUCATION/TRAINING PROGRAM

## 2025-08-06 RX ORDER — METHYLPREDNISOLONE ACETATE 40 MG/ML
40 INJECTION, SUSPENSION INTRA-ARTICULAR; INTRALESIONAL; INTRAMUSCULAR; SOFT TISSUE
Status: COMPLETED | OUTPATIENT
Start: 2025-08-06 | End: 2025-08-06

## 2025-08-06 RX ADMIN — METHYLPREDNISOLONE ACETATE 40 MG: 40 INJECTION, SUSPENSION INTRA-ARTICULAR; INTRALESIONAL; INTRAMUSCULAR; SOFT TISSUE at 11:08

## 2025-08-06 NOTE — PROCEDURES
Patient Name: Rebecca Johnston Zollinger  MRN: [id number]    INFORMED CONSENT: The procedure, risks, benefits and options were discussed with patient. There are no contraindications to the procedure. The patient expressed understanding and agreed to proceed. The personnel performing the procedure was discussed. I verify that I personally obtained Xochitl's consent prior to the start of the procedure and the signed consent can be found on the patient's chart.    Procedure Date: 08/06/2025    Anesthesia: Topical    Pre Procedure diagnosis: M70.60 Trochanteric Bursitis, Unspecified hip    Post-Procedure diagnosis: Same    Sedation: None    PROCEDURE: Left GREATER TROCHANTERIC BURSA INJECTION under ultrasound guidance      DESCRIPTION OF PROCEDURE: The patient was brought to the procedure room. The patient was placed in a lateral position on the procedure table. The skin overlying the greater trochanter was prepped with chlorhexidine three times and draped in a sterile fashion. The ultrasound was used to identify the GTB and the skin was marked. A  21G 4 inch Stimuplex needle was then introduced and guided to the GTB using continuous ultrasound guidance. Negative aspiration was confirmed. A combination of 5 cc of Bupivacaine 0.25% and 40 mg depomedrol was easily injected and was seen spreading in the area of the bursa. The needle was removed intact and bleeding was nil. A sterile dressing was applied. Patient tolerated procedure with no complications. Patient was monitored for 15 minutes following the procedure and then discharged in stable condition. Standard post-injection instructions were provided.     Blood Loss: Nill  Specimen: None    Leslie Cesar MD  08/06/2025    I reviewed and edited the fellow/resident/student/provider's note. I conducted my own interview and physical examination. I agree with the findings. I was present and supervising all critical portions of the procedure.    Devon Gamble MD PhD

## 2025-08-07 ENCOUNTER — CLINICAL SUPPORT (OUTPATIENT)
Dept: REHABILITATION | Facility: HOSPITAL | Age: 70
End: 2025-08-07
Payer: MEDICARE

## 2025-08-07 DIAGNOSIS — M19.011 OSTEOARTHRITIS OF RIGHT GLENOHUMERAL JOINT: Primary | ICD-10-CM

## 2025-08-07 DIAGNOSIS — G89.29 CHRONIC RIGHT SHOULDER PAIN: ICD-10-CM

## 2025-08-07 DIAGNOSIS — M25.511 CHRONIC RIGHT SHOULDER PAIN: ICD-10-CM

## 2025-08-07 DIAGNOSIS — M25.611 DECREASED RANGE OF MOTION OF RIGHT SHOULDER: ICD-10-CM

## 2025-08-07 DIAGNOSIS — R53.1 WEAKNESS: ICD-10-CM

## 2025-08-07 PROCEDURE — 97112 NEUROMUSCULAR REEDUCATION: CPT

## 2025-08-07 PROCEDURE — 97530 THERAPEUTIC ACTIVITIES: CPT

## 2025-08-07 PROCEDURE — 97110 THERAPEUTIC EXERCISES: CPT

## 2025-08-07 NOTE — PROGRESS NOTES
Outpatient Rehab    Physical Therapy Visit    Patient Name: Rebecca Johnston Zollinger  MRN: [id number]  YOB: 1955  Encounter Date: 8/7/2025    Therapy Diagnosis:   Encounter Diagnoses   Name Primary?    Osteoarthritis of right glenohumeral joint Yes    Chronic right shoulder pain     Weakness     Decreased range of motion of right shoulder        Physician: Emanuel Aguilar MD    Physician Orders: Eval and Treat  Medical Diagnosis: Osteoarthritis of right glenohumeral joint  Surgical Diagnosis: Reverse TSA   Surgical Date: 3/19/2025  Days Since Last Surgery: 141    Visit # / Visits Authorized:  31 / 50  Insurance Authorization Period: 1/1/2025 to 12/31/2025  Date of Evaluation: 3/18/2025  Plan of Care Certification: 6/17/2025 to 9/17/2025      PT/PTA:     Number of PTA visits since last PT visit:   Time In: 0754   Time Out: 0859  Total Time (in minutes): 65   Total Billable Time (in minutes): 65    FOTO:  Intake Score (%): Not applicable for this Episode  Survey Score 2 (%): Not applicable for this Episode  Survey Score 3 (%): Not applicable for this Episode    Precautions:  Additional Precautions and Protocol Details: Reverse Shoulder Protocol      Subjective   She did arm exercises while walking yesterday and feels sore today because of it..  Pain reported as 2/10.      Objective          From visit on 7/17  Shoulder Right Right Right Left Left Pain/Dysfunction with Movement     AROM PROM MMT AROM MMT     Flexion 135 146 3+/5 WNL 5/5     Extension 30 NT 3+/5 WNL 5/5     Abduction 135! 75! 3+/5! WNL 5/5 Most painful MMT and ROM   Adduction NT NT 4-/5 WNL 5/5     Internal rotation sacrum 53 3+/5 Inf bord scap 5/5     External Rotation 65 80 3+/5 WNL 5/5        Elbow Right Right Left Left Pain/Dysfunction with Movement     AROM MMT AROM MMT     Flexion WNL 4+/5 WNL 5/5     Extension WNL 4+/5 WNL 5/5         Strength: R=60#;  L=61#    Treatment:    THERAPEUTIC EXERCISES to develop strength,  endurance, ROM, and flexibility, including:   UBE x2'/2' fwd/back  Pulleys x3' abd/flex ecc  Wall slides fwd 2x10 5s with lift - NT  ER 2x10 GTB seated  IR 3x10 BLUE theraband seated  Upper trap stretch 5x15s  Levator scapulae stretch 5x15s  Supine pec stretch x2' 1/2 foam  Seated supported 90/90 ER 2# 2x10  IR 90/90 red TB 2x10       MANUAL THERAPY TECHNIQUES were applied to Right shoulder : NOT TODAY  (R) shld PROM - abd only  Static cupping to levator scapulae and upper trap 5 min (not included in billed time) - NT  Gentle sup GHJ glides  - NT        NEUROMUSCULAR RE-EDUCATION ACTIVITIES to improve Coordination, Proprioception, Posture, and Motor Control.  The following were included:   Seated PNF D2/D1 2x10 1# - NT  SAPD with ER 3x10   Lower trap activations 3x10, 3s  Flex with abd force GrTB  Pluses 3x10       therapeutic activities to improve functional performance, including:  Cabinet lifts 1# scap, abd x10 each  Rows 7# cc 3x10 seated  Body blade through pickle ball vahid ROM 3x20s  Flex through pickleball vahid red theraband 2x10  Cross body HA with red theraband (backhand) 2x10  Farmer's carry 15# 2x1'   Flex overhead hold 1# 2x30s         Time Entry(in minutes):  Neuromuscular Re-Education Time Entry: 10  Therapeutic Activity Time Entry: 35  Therapeutic Exercise Time Entry: 30    Assessment & Plan   Assessment: Progressions made in dynamic scapular strengthening and postural strengthening with fairly good response, though was unable to complete 90/90 without elbow supported. Plan to progress as tolerated.   Evaluation/Treatment Tolerance: Patient tolerated treatment well    The patient will continue to benefit from skilled outpatient physical therapy in order to address the deficits listed in the problem list on the initial evaluation, provide patient and family education, and maximize the patients level of independence in the home and community environments.     The patient's spiritual, cultural,  and educational needs were considered, and the patient is agreeable to the plan of care and goals.           Plan: Monitor response and progress as tolerated.    Goals:   Active       LTG       Pt will improve FOTO score to </=62% limited to decrease perceived limitation with carrying, moving, and handling objects. (Progressing)       Start:  04/02/25    Expected End:  07/02/25            Pt will increase R shoulder AROM to WFL with in protocol in all planes to improve functional use of R RUE. (Progressing)       Start:  04/02/25    Expected End:  07/02/25            Pt will improve R shoulder MMTs by 1 grade to promote equal use of B UEs in performing functional tasks. (Progressing)       Start:  04/02/25    Expected End:  07/02/25            Pt will lift 20 lb objects without pain to promote functional QOL.  (Progressing)       Start:  04/02/25    Expected End:  07/02/25            Pt to report pain </= 2/10 with ADLs and IADLs using R UE to improve functional QOL.  (Progressing)       Start:  04/02/25    Expected End:  07/02/25              Resolved       STG       Pt will be independent with HEP to supplement PT in improving functional use of R UE.  (Met)       Start:  04/02/25    Expected End:  05/02/25    Resolved:  06/17/25         Pt will increase pain free R shoulder elevation AROM to >/= 90 deg to improve  functional mobility of UE (Met)       Start:  04/02/25    Expected End:  05/02/25    Resolved:  06/17/25         Pt will increase shoulder ER PROM in 30 deg abduction to >/=45 deg to improve functional mobility of UE (Met)       Start:  04/02/25    Expected End:  05/02/25    Resolved:  06/17/25         Pt will improve R shoulder MMTs to 3+/5 to promote equal use of B UEs in performing functional tasks. (Met)       Start:  04/02/25    Expected End:  05/02/25    Resolved:  06/24/25               Guru Milian, PT, DPT

## 2025-08-12 ENCOUNTER — CLINICAL SUPPORT (OUTPATIENT)
Dept: REHABILITATION | Facility: HOSPITAL | Age: 70
End: 2025-08-12
Payer: MEDICARE

## 2025-08-12 DIAGNOSIS — M19.011 OSTEOARTHRITIS OF RIGHT GLENOHUMERAL JOINT: Primary | ICD-10-CM

## 2025-08-12 DIAGNOSIS — M25.611 DECREASED RANGE OF MOTION OF RIGHT SHOULDER: ICD-10-CM

## 2025-08-12 DIAGNOSIS — M25.511 CHRONIC RIGHT SHOULDER PAIN: ICD-10-CM

## 2025-08-12 DIAGNOSIS — G89.29 CHRONIC RIGHT SHOULDER PAIN: ICD-10-CM

## 2025-08-12 DIAGNOSIS — R53.1 WEAKNESS: ICD-10-CM

## 2025-08-12 PROCEDURE — 97110 THERAPEUTIC EXERCISES: CPT

## 2025-08-12 PROCEDURE — 97112 NEUROMUSCULAR REEDUCATION: CPT

## 2025-08-12 PROCEDURE — 97530 THERAPEUTIC ACTIVITIES: CPT

## 2025-08-14 ENCOUNTER — CLINICAL SUPPORT (OUTPATIENT)
Dept: REHABILITATION | Facility: HOSPITAL | Age: 70
End: 2025-08-14
Payer: MEDICARE

## 2025-08-14 DIAGNOSIS — R53.1 WEAKNESS: ICD-10-CM

## 2025-08-14 DIAGNOSIS — G89.29 CHRONIC RIGHT SHOULDER PAIN: ICD-10-CM

## 2025-08-14 DIAGNOSIS — M19.011 OSTEOARTHRITIS OF RIGHT GLENOHUMERAL JOINT: Primary | ICD-10-CM

## 2025-08-14 DIAGNOSIS — M25.611 DECREASED RANGE OF MOTION OF RIGHT SHOULDER: ICD-10-CM

## 2025-08-14 DIAGNOSIS — M25.511 CHRONIC RIGHT SHOULDER PAIN: ICD-10-CM

## 2025-08-14 PROCEDURE — 97112 NEUROMUSCULAR REEDUCATION: CPT

## 2025-08-14 PROCEDURE — 97530 THERAPEUTIC ACTIVITIES: CPT

## 2025-08-14 PROCEDURE — 97110 THERAPEUTIC EXERCISES: CPT

## 2025-08-19 ENCOUNTER — CLINICAL SUPPORT (OUTPATIENT)
Dept: REHABILITATION | Facility: HOSPITAL | Age: 70
End: 2025-08-19
Payer: MEDICARE

## 2025-08-19 DIAGNOSIS — G89.29 CHRONIC RIGHT SHOULDER PAIN: ICD-10-CM

## 2025-08-19 DIAGNOSIS — R53.1 WEAKNESS: ICD-10-CM

## 2025-08-19 DIAGNOSIS — M19.011 OSTEOARTHRITIS OF RIGHT GLENOHUMERAL JOINT: Primary | ICD-10-CM

## 2025-08-19 DIAGNOSIS — M25.611 DECREASED RANGE OF MOTION OF RIGHT SHOULDER: ICD-10-CM

## 2025-08-19 DIAGNOSIS — M25.511 CHRONIC RIGHT SHOULDER PAIN: ICD-10-CM

## 2025-08-19 PROCEDURE — 97530 THERAPEUTIC ACTIVITIES: CPT

## 2025-08-20 ENCOUNTER — OFFICE VISIT (OUTPATIENT)
Dept: PAIN MEDICINE | Facility: CLINIC | Age: 70
End: 2025-08-20
Payer: MEDICARE

## 2025-08-20 DIAGNOSIS — M54.14 THORACIC RADICULOPATHY: ICD-10-CM

## 2025-08-20 DIAGNOSIS — M70.60 GREATER TROCHANTERIC BURSITIS, UNSPECIFIED LATERALITY: ICD-10-CM

## 2025-08-20 DIAGNOSIS — M79.18 MYOFASCIAL PAIN: ICD-10-CM

## 2025-08-20 DIAGNOSIS — M54.16 LUMBAR RADICULOPATHY: Primary | ICD-10-CM

## 2025-08-20 PROCEDURE — 98006 SYNCH AUDIO-VIDEO EST MOD 30: CPT | Mod: 95,,, | Performed by: STUDENT IN AN ORGANIZED HEALTH CARE EDUCATION/TRAINING PROGRAM

## 2025-08-20 PROCEDURE — 3044F HG A1C LEVEL LT 7.0%: CPT | Mod: CPTII,95,, | Performed by: STUDENT IN AN ORGANIZED HEALTH CARE EDUCATION/TRAINING PROGRAM

## 2025-08-20 PROCEDURE — 1159F MED LIST DOCD IN RCRD: CPT | Mod: CPTII,95,, | Performed by: STUDENT IN AN ORGANIZED HEALTH CARE EDUCATION/TRAINING PROGRAM

## 2025-08-20 PROCEDURE — 1160F RVW MEDS BY RX/DR IN RCRD: CPT | Mod: CPTII,95,, | Performed by: STUDENT IN AN ORGANIZED HEALTH CARE EDUCATION/TRAINING PROGRAM

## 2025-08-20 PROCEDURE — 4010F ACE/ARB THERAPY RXD/TAKEN: CPT | Mod: CPTII,95,, | Performed by: STUDENT IN AN ORGANIZED HEALTH CARE EDUCATION/TRAINING PROGRAM

## 2025-08-20 PROCEDURE — G2211 COMPLEX E/M VISIT ADD ON: HCPCS | Mod: 95,,, | Performed by: STUDENT IN AN ORGANIZED HEALTH CARE EDUCATION/TRAINING PROGRAM

## 2025-08-21 ENCOUNTER — TELEPHONE (OUTPATIENT)
Dept: PAIN MEDICINE | Facility: CLINIC | Age: 70
End: 2025-08-21
Payer: MEDICARE

## 2025-08-21 ENCOUNTER — CLINICAL SUPPORT (OUTPATIENT)
Dept: REHABILITATION | Facility: HOSPITAL | Age: 70
End: 2025-08-21
Payer: MEDICARE

## 2025-08-21 DIAGNOSIS — M54.14 THORACIC RADICULOPATHY: Primary | ICD-10-CM

## 2025-08-21 DIAGNOSIS — M19.011 OSTEOARTHRITIS OF RIGHT GLENOHUMERAL JOINT: Primary | ICD-10-CM

## 2025-08-21 DIAGNOSIS — M25.611 DECREASED RANGE OF MOTION OF RIGHT SHOULDER: ICD-10-CM

## 2025-08-21 DIAGNOSIS — R53.1 WEAKNESS: ICD-10-CM

## 2025-08-21 DIAGNOSIS — M54.16 LUMBAR RADICULOPATHY: ICD-10-CM

## 2025-08-21 DIAGNOSIS — G89.29 CHRONIC RIGHT SHOULDER PAIN: ICD-10-CM

## 2025-08-21 DIAGNOSIS — M25.511 CHRONIC RIGHT SHOULDER PAIN: ICD-10-CM

## 2025-08-21 PROCEDURE — 97530 THERAPEUTIC ACTIVITIES: CPT

## 2025-08-22 ENCOUNTER — TELEPHONE (OUTPATIENT)
Dept: PAIN MEDICINE | Facility: CLINIC | Age: 70
End: 2025-08-22
Payer: MEDICARE

## 2025-08-26 ENCOUNTER — CLINICAL SUPPORT (OUTPATIENT)
Dept: REHABILITATION | Facility: HOSPITAL | Age: 70
End: 2025-08-26
Payer: MEDICARE

## 2025-08-26 DIAGNOSIS — M25.511 CHRONIC RIGHT SHOULDER PAIN: ICD-10-CM

## 2025-08-26 DIAGNOSIS — M25.611 DECREASED RANGE OF MOTION OF RIGHT SHOULDER: ICD-10-CM

## 2025-08-26 DIAGNOSIS — G89.29 CHRONIC RIGHT SHOULDER PAIN: ICD-10-CM

## 2025-08-26 DIAGNOSIS — M19.011 OSTEOARTHRITIS OF RIGHT GLENOHUMERAL JOINT: Primary | ICD-10-CM

## 2025-08-26 DIAGNOSIS — R53.1 WEAKNESS: ICD-10-CM

## 2025-08-26 PROCEDURE — 97530 THERAPEUTIC ACTIVITIES: CPT

## 2025-08-28 ENCOUNTER — CLINICAL SUPPORT (OUTPATIENT)
Dept: REHABILITATION | Facility: HOSPITAL | Age: 70
End: 2025-08-28
Payer: MEDICARE

## 2025-08-28 DIAGNOSIS — M25.511 CHRONIC RIGHT SHOULDER PAIN: ICD-10-CM

## 2025-08-28 DIAGNOSIS — M19.011 OSTEOARTHRITIS OF RIGHT GLENOHUMERAL JOINT: Primary | ICD-10-CM

## 2025-08-28 DIAGNOSIS — M25.611 DECREASED RANGE OF MOTION OF RIGHT SHOULDER: ICD-10-CM

## 2025-08-28 DIAGNOSIS — G89.29 CHRONIC RIGHT SHOULDER PAIN: ICD-10-CM

## 2025-08-28 DIAGNOSIS — R53.1 WEAKNESS: ICD-10-CM

## 2025-08-28 PROCEDURE — 97112 NEUROMUSCULAR REEDUCATION: CPT

## 2025-08-28 PROCEDURE — 97530 THERAPEUTIC ACTIVITIES: CPT

## 2025-08-28 PROCEDURE — 97110 THERAPEUTIC EXERCISES: CPT

## 2025-08-29 ENCOUNTER — HOSPITAL ENCOUNTER (OUTPATIENT)
Facility: HOSPITAL | Age: 70
Discharge: HOME | End: 2025-08-29
Attending: STUDENT IN AN ORGANIZED HEALTH CARE EDUCATION/TRAINING PROGRAM | Admitting: STUDENT IN AN ORGANIZED HEALTH CARE EDUCATION/TRAINING PROGRAM
Payer: MEDICARE

## 2025-08-29 VITALS
BODY MASS INDEX: 27.31 KG/M2 | TEMPERATURE: 98 F | OXYGEN SATURATION: 93 % | DIASTOLIC BLOOD PRESSURE: 72 MMHG | WEIGHT: 160 LBS | HEIGHT: 64 IN | RESPIRATION RATE: 16 BRPM | SYSTOLIC BLOOD PRESSURE: 132 MMHG | HEART RATE: 64 BPM

## 2025-08-29 DIAGNOSIS — M54.16 LUMBAR RADICULOPATHY: Primary | ICD-10-CM

## 2025-08-29 DIAGNOSIS — G89.29 CHRONIC PAIN: ICD-10-CM

## 2025-08-29 PROCEDURE — 64483 NJX AA&/STRD TFRM EPI L/S 1: CPT | Mod: 50,,, | Performed by: STUDENT IN AN ORGANIZED HEALTH CARE EDUCATION/TRAINING PROGRAM

## 2025-08-29 PROCEDURE — 63600175 PHARM REV CODE 636 W HCPCS: Performed by: STUDENT IN AN ORGANIZED HEALTH CARE EDUCATION/TRAINING PROGRAM

## 2025-08-29 PROCEDURE — 25500020 PHARM REV CODE 255: Performed by: STUDENT IN AN ORGANIZED HEALTH CARE EDUCATION/TRAINING PROGRAM

## 2025-08-29 PROCEDURE — 64483 NJX AA&/STRD TFRM EPI L/S 1: CPT | Mod: 50 | Performed by: STUDENT IN AN ORGANIZED HEALTH CARE EDUCATION/TRAINING PROGRAM

## 2025-08-29 RX ORDER — SODIUM CHLORIDE 9 MG/ML
500 INJECTION, SOLUTION INTRAVENOUS CONTINUOUS
Status: DISCONTINUED | OUTPATIENT
Start: 2025-08-29 | End: 2025-08-29 | Stop reason: HOSPADM

## 2025-08-29 RX ORDER — MIDAZOLAM HYDROCHLORIDE 1 MG/ML
INJECTION INTRAMUSCULAR; INTRAVENOUS
Status: DISCONTINUED | OUTPATIENT
Start: 2025-08-29 | End: 2025-08-29 | Stop reason: HOSPADM

## 2025-08-29 RX ORDER — LIDOCAINE HYDROCHLORIDE 10 MG/ML
1 INJECTION, SOLUTION EPIDURAL; INFILTRATION; INTRACAUDAL; PERINEURAL ONCE
Status: DISCONTINUED | OUTPATIENT
Start: 2025-08-29 | End: 2025-08-29 | Stop reason: HOSPADM

## 2025-08-29 RX ORDER — LIDOCAINE HYDROCHLORIDE 10 MG/ML
INJECTION, SOLUTION EPIDURAL; INFILTRATION; INTRACAUDAL; PERINEURAL
Status: DISCONTINUED | OUTPATIENT
Start: 2025-08-29 | End: 2025-08-29 | Stop reason: HOSPADM

## 2025-08-29 RX ORDER — LIDOCAINE HYDROCHLORIDE 20 MG/ML
INJECTION, SOLUTION EPIDURAL; INFILTRATION; INTRACAUDAL; PERINEURAL
Status: DISCONTINUED | OUTPATIENT
Start: 2025-08-29 | End: 2025-08-29 | Stop reason: HOSPADM

## 2025-08-29 RX ORDER — DEXAMETHASONE SODIUM PHOSPHATE 10 MG/ML
INJECTION, SOLUTION INTRA-ARTICULAR; INTRALESIONAL; INTRAMUSCULAR; INTRAVENOUS; SOFT TISSUE
Status: DISCONTINUED | OUTPATIENT
Start: 2025-08-29 | End: 2025-08-29 | Stop reason: HOSPADM

## 2025-09-02 ENCOUNTER — TELEPHONE (OUTPATIENT)
Dept: PAIN MEDICINE | Facility: CLINIC | Age: 70
End: 2025-09-02
Payer: MEDICARE

## 2025-09-02 ENCOUNTER — CLINICAL SUPPORT (OUTPATIENT)
Dept: REHABILITATION | Facility: HOSPITAL | Age: 70
End: 2025-09-02
Payer: MEDICARE

## 2025-09-02 DIAGNOSIS — R53.1 WEAKNESS: ICD-10-CM

## 2025-09-02 DIAGNOSIS — M19.011 OSTEOARTHRITIS OF RIGHT GLENOHUMERAL JOINT: Primary | ICD-10-CM

## 2025-09-02 DIAGNOSIS — M25.511 CHRONIC RIGHT SHOULDER PAIN: ICD-10-CM

## 2025-09-02 DIAGNOSIS — G89.29 CHRONIC RIGHT SHOULDER PAIN: ICD-10-CM

## 2025-09-02 DIAGNOSIS — M25.611 DECREASED RANGE OF MOTION OF RIGHT SHOULDER: ICD-10-CM

## 2025-09-02 PROCEDURE — 97110 THERAPEUTIC EXERCISES: CPT

## 2025-09-02 PROCEDURE — 97530 THERAPEUTIC ACTIVITIES: CPT

## 2025-09-02 PROCEDURE — 97112 NEUROMUSCULAR REEDUCATION: CPT

## (undated) DEVICE — DRESSING AQUACEL AG 3.5X10IN

## (undated) DEVICE — SUT MCRYL PLUS 4-0 PS2 27IN

## (undated) DEVICE — COVER MAYO STND XL 30X57IN

## (undated) DEVICE — DRESSING LEUKOPLAST FLEX 1X3IN

## (undated) DEVICE — DRAPE STERI INSTRUMENT 1018

## (undated) DEVICE — Device

## (undated) DEVICE — SUT VICRYL PLUS 0 CT1 18IN

## (undated) DEVICE — ORTHOCORD W/OS-6

## (undated) DEVICE — GLOVE BIOGEL SKINSENSE PI 8.0

## (undated) DEVICE — GUIDE WIRE
Type: IMPLANTABLE DEVICE | Site: SHOULDER | Status: NON-FUNCTIONAL
Brand: TORNIER PERFORM
Removed: 2025-03-19

## (undated) DEVICE — TIP IRR PULSED LAV SPLSH DISP

## (undated) DEVICE — ELECTRODE REM PLYHSV RETURN 9

## (undated) DEVICE — DRAPE INCISE IOBAN 2 23X17IN

## (undated) DEVICE — KIT IRR SUCTION HND PIECE

## (undated) DEVICE — BIT DRILL PERIPH SCREW 3.2MM

## (undated) DEVICE — BLANKET HYPOTHERMIA 25X64IN

## (undated) DEVICE — SPONGE LAP 18X18 PREWASHED

## (undated) DEVICE — DRAPE STERI U-SHAPED 47X51IN

## (undated) DEVICE — BNDG COFLEX FOAM LF2 ST 6X5YD

## (undated) DEVICE — SUPPORT SLING SHOT II MEDIUM

## (undated) DEVICE — BNDG COFLEX FOAM LF2 ST 4X5YD

## (undated) DEVICE — SET BASIN 48X48IN 6000ML RING

## (undated) DEVICE — DRAPE U SPLIT SHEET 54X76IN

## (undated) DEVICE — GLOVE SENSICARE PI GRN 7

## (undated) DEVICE — SUT VICRYL PLUS 3-0 SH 18IN

## (undated) DEVICE — ADHESIVE DERMABOND ADVANCED

## (undated) DEVICE — GLOVE SENSICARE PI SURG 7

## (undated) DEVICE — SUT 0 VICRYL / CT-1

## (undated) DEVICE — DRAPE STERI-DRAPE 1000 17X11IN

## (undated) DEVICE — BLADE SAW SAG 22.13MM 0.92MM

## (undated) DEVICE — SUT VICRYL CTD 2-0 GI 27 SH

## (undated) DEVICE — HOOD T7 W/ PEEL AWAY LENS

## (undated) DEVICE — SOL NORMAL USPCA 0.9%

## (undated) DEVICE — PENCIL SMK EVAC CONNECTOR 10FT

## (undated) DEVICE — COVER LIGHT HANDLE 80/CA

## (undated) DEVICE — COVER CAMERA OPERATING ROOM

## (undated) DEVICE — DRAPE THREE-QTR REINF 53X77IN

## (undated) DEVICE — UNDERGLOVES BIOGEL PI SIZE 8

## (undated) DEVICE — WRAP SHLDR HIP ACCU THRM PACK